# Patient Record
Sex: FEMALE | Race: OTHER | Employment: FULL TIME | URBAN - METROPOLITAN AREA
[De-identification: names, ages, dates, MRNs, and addresses within clinical notes are randomized per-mention and may not be internally consistent; named-entity substitution may affect disease eponyms.]

---

## 2019-02-13 ENCOUNTER — HOSPITAL ENCOUNTER (OUTPATIENT)
Facility: HOSPITAL | Age: 29
Setting detail: OBSERVATION
Discharge: HOME | End: 2019-02-14
Attending: EMERGENCY MEDICINE | Admitting: SURGERY
Payer: MEDICAID

## 2019-02-13 ENCOUNTER — APPOINTMENT (OUTPATIENT)
Dept: CT IMAGING | Age: 29
End: 2019-02-13
Attending: EMERGENCY MEDICINE
Payer: MEDICAID

## 2019-02-13 DIAGNOSIS — K35.80 ACUTE APPENDICITIS: ICD-10-CM

## 2019-02-13 DIAGNOSIS — R10.31 ABDOMINAL PAIN, RIGHT LOWER QUADRANT: Primary | ICD-10-CM

## 2019-02-13 LAB
ALBUMIN SERPL-MCNC: 3.5 G/DL (ref 3.4–5)
ALBUMIN/GLOB SERPL: 0.9 {RATIO} (ref 1–2)
ALP LIVER SERPL-CCNC: 62 U/L (ref 37–98)
ALT SERPL-CCNC: 14 U/L (ref 13–56)
ANION GAP SERPL CALC-SCNC: 9 MMOL/L (ref 0–18)
AST SERPL-CCNC: 11 U/L (ref 15–37)
BASOPHILS # BLD AUTO: 0.04 X10(3) UL (ref 0–0.2)
BASOPHILS NFR BLD AUTO: 0.3 %
BILIRUB SERPL-MCNC: 0.5 MG/DL (ref 0.1–2)
BILIRUB UR QL STRIP.AUTO: NEGATIVE
BUN BLD-MCNC: 6 MG/DL (ref 7–18)
BUN/CREAT SERPL: 6.7 (ref 10–20)
CALCIUM BLD-MCNC: 8.2 MG/DL (ref 8.5–10.1)
CHLORIDE SERPL-SCNC: 105 MMOL/L (ref 98–107)
CLARITY UR REFRACT.AUTO: CLEAR
CO2 SERPL-SCNC: 23 MMOL/L (ref 21–32)
COLOR UR AUTO: YELLOW
CREAT BLD-MCNC: 0.9 MG/DL (ref 0.55–1.02)
DEPRECATED RDW RBC AUTO: 40.8 FL (ref 35.1–46.3)
EOSINOPHIL # BLD AUTO: 0.02 X10(3) UL (ref 0–0.7)
EOSINOPHIL NFR BLD AUTO: 0.2 %
ERYTHROCYTE [DISTWIDTH] IN BLOOD BY AUTOMATED COUNT: 12.4 % (ref 11–15)
EXPIRATION DATE: NORMAL
GLOBULIN PLAS-MCNC: 4 G/DL (ref 2.8–4.4)
GLUCOSE BLD-MCNC: 116 MG/DL (ref 70–99)
GLUCOSE UR STRIP.AUTO-MCNC: NEGATIVE MG/DL
HCG SERPL QL: NEGATIVE
HCT VFR BLD AUTO: 35.7 % (ref 35–48)
HGB BLD-MCNC: 11.8 G/DL (ref 12–16)
IMM GRANULOCYTES # BLD AUTO: 0.02 X10(3) UL (ref 0–1)
IMM GRANULOCYTES NFR BLD: 0.2 %
KETONES UR STRIP.AUTO-MCNC: 15 MG/DL
LEUKOCYTE ESTERASE UR QL STRIP.AUTO: NEGATIVE
LYMPHOCYTES # BLD AUTO: 1.32 X10(3) UL (ref 1–4)
LYMPHOCYTES NFR BLD AUTO: 11.3 %
M PROTEIN MFR SERPL ELPH: 7.5 G/DL (ref 6.4–8.2)
MCH RBC QN AUTO: 29.6 PG (ref 26–34)
MCHC RBC AUTO-ENTMCNC: 33.1 G/DL (ref 31–37)
MCV RBC AUTO: 89.5 FL (ref 80–100)
MONOCYTES # BLD AUTO: 1.08 X10(3) UL (ref 0.1–1)
MONOCYTES NFR BLD AUTO: 9.2 %
NEUTROPHILS # BLD AUTO: 9.2 X10 (3) UL (ref 1.5–7.7)
NEUTROPHILS # BLD AUTO: 9.2 X10(3) UL (ref 1.5–7.7)
NEUTROPHILS NFR BLD AUTO: 78.8 %
NITRITE UR QL STRIP.AUTO: NEGATIVE
OSMOLALITY SERPL CALC.SUM OF ELEC: 283 MOSM/KG (ref 275–295)
PH UR STRIP.AUTO: 7 [PH] (ref 4.5–8)
PLATELET # BLD AUTO: 209 10(3)UL (ref 150–450)
POCT LOT NUMBER: NORMAL
POCT URINE PREGNANCY: NEGATIVE
POTASSIUM SERPL-SCNC: 3.4 MMOL/L (ref 3.5–5.1)
RBC # BLD AUTO: 3.99 X10(6)UL (ref 3.8–5.3)
SODIUM SERPL-SCNC: 137 MMOL/L (ref 136–145)
SP GR UR STRIP.AUTO: 1.02 (ref 1–1.03)
UROBILINOGEN UR STRIP.AUTO-MCNC: 1 MG/DL
WBC # BLD AUTO: 11.7 X10(3) UL (ref 4–11)

## 2019-02-13 PROCEDURE — 74177 CT ABD & PELVIS W/CONTRAST: CPT | Performed by: EMERGENCY MEDICINE

## 2019-02-13 RX ORDER — HYDROMORPHONE HYDROCHLORIDE 1 MG/ML
0.5 INJECTION, SOLUTION INTRAMUSCULAR; INTRAVENOUS; SUBCUTANEOUS EVERY 2 HOUR PRN
Status: DISCONTINUED | OUTPATIENT
Start: 2019-02-13 | End: 2019-02-14

## 2019-02-13 RX ORDER — HYDROMORPHONE HYDROCHLORIDE 1 MG/ML
1 INJECTION, SOLUTION INTRAMUSCULAR; INTRAVENOUS; SUBCUTANEOUS EVERY 2 HOUR PRN
Status: DISCONTINUED | OUTPATIENT
Start: 2019-02-13 | End: 2019-02-14

## 2019-02-13 RX ORDER — DEXTROSE AND SODIUM CHLORIDE 5; .45 G/100ML; G/100ML
INJECTION, SOLUTION INTRAVENOUS CONTINUOUS
Status: DISCONTINUED | OUTPATIENT
Start: 2019-02-13 | End: 2019-02-13

## 2019-02-13 RX ORDER — SODIUM CHLORIDE, SODIUM LACTATE, POTASSIUM CHLORIDE, CALCIUM CHLORIDE 600; 310; 30; 20 MG/100ML; MG/100ML; MG/100ML; MG/100ML
INJECTION, SOLUTION INTRAVENOUS CONTINUOUS
Status: DISCONTINUED | OUTPATIENT
Start: 2019-02-14 | End: 2019-02-14

## 2019-02-13 RX ORDER — ACETAMINOPHEN 500 MG
1000 TABLET ORAL ONCE
Status: COMPLETED | OUTPATIENT
Start: 2019-02-13 | End: 2019-02-13

## 2019-02-13 RX ORDER — ONDANSETRON 2 MG/ML
4 INJECTION INTRAMUSCULAR; INTRAVENOUS
Status: DISCONTINUED | OUTPATIENT
Start: 2019-02-13 | End: 2019-02-13

## 2019-02-13 RX ORDER — ONDANSETRON 2 MG/ML
4 INJECTION INTRAMUSCULAR; INTRAVENOUS EVERY 6 HOURS PRN
Status: DISCONTINUED | OUTPATIENT
Start: 2019-02-13 | End: 2019-02-14

## 2019-02-13 RX ORDER — ONDANSETRON 2 MG/ML
4 INJECTION INTRAMUSCULAR; INTRAVENOUS EVERY 4 HOURS PRN
Status: DISCONTINUED | OUTPATIENT
Start: 2019-02-13 | End: 2019-02-14

## 2019-02-14 ENCOUNTER — ANESTHESIA (OUTPATIENT)
Dept: SURGERY | Facility: HOSPITAL | Age: 29
End: 2019-02-14

## 2019-02-14 ENCOUNTER — ANESTHESIA EVENT (OUTPATIENT)
Dept: SURGERY | Facility: HOSPITAL | Age: 29
End: 2019-02-14

## 2019-02-14 VITALS
SYSTOLIC BLOOD PRESSURE: 106 MMHG | OXYGEN SATURATION: 97 % | HEIGHT: 62 IN | RESPIRATION RATE: 15 BRPM | TEMPERATURE: 98 F | BODY MASS INDEX: 37.02 KG/M2 | HEART RATE: 58 BPM | DIASTOLIC BLOOD PRESSURE: 49 MMHG | WEIGHT: 201.19 LBS

## 2019-02-14 LAB
ANION GAP SERPL CALC-SCNC: 7 MMOL/L (ref 0–18)
BUN BLD-MCNC: 4 MG/DL (ref 7–18)
BUN/CREAT SERPL: 4.3 (ref 10–20)
CALCIUM BLD-MCNC: 8.2 MG/DL (ref 8.5–10.1)
CHLORIDE SERPL-SCNC: 111 MMOL/L (ref 98–107)
CO2 SERPL-SCNC: 23 MMOL/L (ref 21–32)
CREAT BLD-MCNC: 0.92 MG/DL (ref 0.55–1.02)
GLUCOSE BLD-MCNC: 130 MG/DL (ref 70–99)
HCG URINE QUALITATIVE: NEGATIVE
OSMOLALITY SERPL CALC.SUM OF ELEC: 291 MOSM/KG (ref 275–295)
POTASSIUM SERPL-SCNC: 3.8 MMOL/L (ref 3.5–5.1)
SODIUM SERPL-SCNC: 141 MMOL/L (ref 136–145)

## 2019-02-14 PROCEDURE — 99203 OFFICE O/P NEW LOW 30 MIN: CPT | Performed by: SURGERY

## 2019-02-14 PROCEDURE — 44970 LAPAROSCOPY APPENDECTOMY: CPT | Performed by: SURGERY

## 2019-02-14 PROCEDURE — 0DTJ4ZZ RESECTION OF APPENDIX, PERCUTANEOUS ENDOSCOPIC APPROACH: ICD-10-PCS | Performed by: SURGERY

## 2019-02-14 RX ORDER — DIPHENHYDRAMINE HYDROCHLORIDE 50 MG/ML
12.5 INJECTION INTRAMUSCULAR; INTRAVENOUS AS NEEDED
Status: DISCONTINUED | OUTPATIENT
Start: 2019-02-14 | End: 2019-02-14 | Stop reason: HOSPADM

## 2019-02-14 RX ORDER — ONDANSETRON 2 MG/ML
4 INJECTION INTRAMUSCULAR; INTRAVENOUS AS NEEDED
Status: DISCONTINUED | OUTPATIENT
Start: 2019-02-14 | End: 2019-02-14 | Stop reason: HOSPADM

## 2019-02-14 RX ORDER — BUPIVACAINE HYDROCHLORIDE 5 MG/ML
INJECTION, SOLUTION EPIDURAL; INTRACAUDAL AS NEEDED
Status: DISCONTINUED | OUTPATIENT
Start: 2019-02-14 | End: 2019-02-14

## 2019-02-14 RX ORDER — HYDROCODONE BITARTRATE AND ACETAMINOPHEN 5; 325 MG/1; MG/1
1 TABLET ORAL AS NEEDED
Status: DISCONTINUED | OUTPATIENT
Start: 2019-02-14 | End: 2019-02-14 | Stop reason: HOSPADM

## 2019-02-14 RX ORDER — SODIUM CHLORIDE, SODIUM LACTATE, POTASSIUM CHLORIDE, CALCIUM CHLORIDE 600; 310; 30; 20 MG/100ML; MG/100ML; MG/100ML; MG/100ML
INJECTION, SOLUTION INTRAVENOUS CONTINUOUS
Status: DISCONTINUED | OUTPATIENT
Start: 2019-02-14 | End: 2019-02-14 | Stop reason: HOSPADM

## 2019-02-14 RX ORDER — MEPERIDINE HYDROCHLORIDE 25 MG/ML
INJECTION INTRAMUSCULAR; INTRAVENOUS; SUBCUTANEOUS
Status: COMPLETED
Start: 2019-02-14 | End: 2019-02-14

## 2019-02-14 RX ORDER — MIDAZOLAM HYDROCHLORIDE 1 MG/ML
1 INJECTION INTRAMUSCULAR; INTRAVENOUS EVERY 5 MIN PRN
Status: DISCONTINUED | OUTPATIENT
Start: 2019-02-14 | End: 2019-02-14 | Stop reason: HOSPADM

## 2019-02-14 RX ORDER — MEPERIDINE HYDROCHLORIDE 25 MG/ML
12.5 INJECTION INTRAMUSCULAR; INTRAVENOUS; SUBCUTANEOUS AS NEEDED
Status: DISCONTINUED | OUTPATIENT
Start: 2019-02-14 | End: 2019-02-14 | Stop reason: HOSPADM

## 2019-02-14 RX ORDER — HYDROCODONE BITARTRATE AND ACETAMINOPHEN 5; 325 MG/1; MG/1
1-2 TABLET ORAL EVERY 4 HOURS PRN
Qty: 20 TABLET | Refills: 0 | Status: SHIPPED | OUTPATIENT
Start: 2019-02-14 | End: 2019-07-25 | Stop reason: ALTCHOICE

## 2019-02-14 RX ORDER — HYDROCODONE BITARTRATE AND ACETAMINOPHEN 5; 325 MG/1; MG/1
2 TABLET ORAL AS NEEDED
Status: DISCONTINUED | OUTPATIENT
Start: 2019-02-14 | End: 2019-02-14 | Stop reason: HOSPADM

## 2019-02-14 RX ORDER — NALOXONE HYDROCHLORIDE 0.4 MG/ML
80 INJECTION, SOLUTION INTRAMUSCULAR; INTRAVENOUS; SUBCUTANEOUS AS NEEDED
Status: DISCONTINUED | OUTPATIENT
Start: 2019-02-14 | End: 2019-02-14 | Stop reason: HOSPADM

## 2019-02-14 RX ORDER — HYDROMORPHONE HYDROCHLORIDE 1 MG/ML
INJECTION, SOLUTION INTRAMUSCULAR; INTRAVENOUS; SUBCUTANEOUS
Status: COMPLETED
Start: 2019-02-14 | End: 2019-02-14

## 2019-02-14 RX ORDER — HYDROMORPHONE HYDROCHLORIDE 1 MG/ML
0.4 INJECTION, SOLUTION INTRAMUSCULAR; INTRAVENOUS; SUBCUTANEOUS EVERY 5 MIN PRN
Status: DISCONTINUED | OUTPATIENT
Start: 2019-02-14 | End: 2019-02-14 | Stop reason: HOSPADM

## 2019-02-14 RX ORDER — METOCLOPRAMIDE HYDROCHLORIDE 5 MG/ML
10 INJECTION INTRAMUSCULAR; INTRAVENOUS AS NEEDED
Status: DISCONTINUED | OUTPATIENT
Start: 2019-02-14 | End: 2019-02-14 | Stop reason: HOSPADM

## 2019-02-14 NOTE — OPERATIVE REPORT
[facility]    PATIENT'S NAME: Eduard Stahl   ATTENDING PHYSICIAN: Sancho Pedroza D.O.   OPERATING PHYSICIAN: Sancho Pedroza D.O.   PATIENT ACCOUNT#:   [de-identified]    LOCATION:  [address] [place] [address]  MEDICAL RECORD #:   [id number]       DATE OF BIRTH:  [birth year] anteriorly. The mesoappendix was taken down in sequential fashion using Sonicision cautery device.   The echelon Endo HARRISON was then fired across the cecum at the level of the appendix, and the appendix was amputated and brought out through the 10 port intac

## 2019-02-14 NOTE — ANESTHESIA POSTPROCEDURE EVALUATION
[address] Patient Status:  Observation   Age/Gender 34year old female MRN [id number]   [facility] 0SW-A Attending Christiano Zamorano, DO   Hosp Day # 0 PCP No primary care provider on file.        Anesthesia Post-op Note

## 2019-02-14 NOTE — ANESTHESIA PREPROCEDURE EVALUATION
PRE-OP EVALUATION    Patient Name: Kanu Russell    Pre-op Diagnosis: Acute appendicitis [K35.80]    Procedure(s):  LAPAROSCOPIC APPENDECTOMY, POSSIBLE OPEN    Surgeon(s) and Role:     Dave Turner,  - Romeo    Pre-op vitals reviewed.   Temp: 98.4 Component Value Date    WBC 11.7 (H) 02/13/2019    RBC 3.99 02/13/2019    HGB 11.8 (L) 02/13/2019    HCT 35.7 02/13/2019    MCV 89.5 02/13/2019    MCH 29.6 02/13/2019    MCHC 33.1 02/13/2019    RDW 12.4 02/13/2019    .0 02/13/2019     Lab Results

## 2019-02-14 NOTE — CM/SW NOTE
Pt. Received OON paperwork and wants to stay at [facility] per Alley Branett RN [facility] [address] [facility]

## 2019-02-14 NOTE — H&P
Taylor Rodriguez is a 34year old female  Patient presents with:  Abdomen/Flank Pain (GI/)      REFERRED BY    Patient presents with patient presents with right lower quadrant pain. Patient states the pain started yesterday.   It was initially in the periu :Alert, oriented x 3  PSYCH: normal mood and affect  SKIN: anicteric, no rashes, no bruising  EYES: PERRLA, EOMI, sclera anicteric,  conjunctiva without pallor  HEENT: normocephalic, atraumatic, TMs clear, nares patent, mouth moist, pharynx w/o erythema  N

## 2019-02-14 NOTE — PLAN OF CARE
Assumed care of patient around 0730, alert and oriented X4, no c/o CP/SOB, SpO2 98% on RA    0800-pt back from OR- 3 lap sites to abdomen, streri-strip, c/d/i, pain well controlled  Voided  Has friends at bedside    Patient tele d/c'd IV discontinued with

## 2019-02-18 ENCOUNTER — TELEPHONE (OUTPATIENT)
Dept: SURGERY | Facility: CLINIC | Age: 29
End: 2019-02-18

## 2019-02-18 RX ORDER — AMOXICILLIN AND CLAVULANATE POTASSIUM 875; 125 MG/1; MG/1
1 TABLET, FILM COATED ORAL 2 TIMES DAILY
Qty: 20 TABLET | Refills: 0 | Status: SHIPPED | OUTPATIENT
Start: 2019-02-18 | End: 2019-02-28

## 2019-02-18 NOTE — TELEPHONE ENCOUNTER
[facility]  Order for augmentin 875 BID for 10 days given  Med ordered. Patient notified and verb understanding.     Requested Prescriptions     Signed Prescriptions Disp Refills   • Amoxicillin-Pot Clavulanate (AUGMENTIN) 875-125 MG Or

## 2019-02-18 NOTE — TELEPHONE ENCOUNTER
PT calling states had lap appey on 2/14 and c/o bruising, pus, chills, and mild odor at site. States the bruise appeared yesterday and has gotten bigger. States feels numb-like sensation at as well. Her current PN level is 4/10. Denies fever.   Denies b

## 2019-02-19 ENCOUNTER — OFFICE VISIT (OUTPATIENT)
Dept: SURGERY | Facility: CLINIC | Age: 29
End: 2019-02-19

## 2019-02-19 VITALS
WEIGHT: 200 LBS | TEMPERATURE: 99 F | HEART RATE: 90 BPM | BODY MASS INDEX: 36.8 KG/M2 | SYSTOLIC BLOOD PRESSURE: 128 MMHG | DIASTOLIC BLOOD PRESSURE: 78 MMHG | HEIGHT: 62 IN

## 2019-02-19 DIAGNOSIS — K35.30 ACUTE APPENDICITIS WITH LOCALIZED PERITONITIS, WITHOUT PERFORATION, ABSCESS, OR GANGRENE: Primary | ICD-10-CM

## 2019-02-19 DIAGNOSIS — R10.31 ABDOMINAL PAIN, RIGHT LOWER QUADRANT: ICD-10-CM

## 2019-02-19 PROCEDURE — 99024 POSTOP FOLLOW-UP VISIT: CPT | Performed by: PHYSICIAN ASSISTANT

## 2019-02-19 NOTE — PROGRESS NOTES
Post Operative Visit Note       Active Problems  1. Acute appendicitis with localized peritonitis, without perforation, abscess, or gangrene    2.  Abdominal pain, right lower quadrant         Chief Complaint   Patient presents with:  Post-Op: P/O, LAP APPE Activity      Alcohol use: No        Frequency: Never      Drug use: No    Other Topics      Concerns:       Current Outpatient Medications:   •  Amoxicillin-Pot Clavulanate (AUGMENTIN) 875-125 MG Oral Tab, Take 1 tablet by mouth 2 (two) times daily for 10 murmur heard. Pulmonary/Chest: Effort normal and breath sounds normal. No respiratory distress. She has no wheezes. She has no rales. Abdominal: Soft. Bowel sounds are normal. She exhibits no distension. There is no tenderness.  There is no rebound and n time.  There is no evidence of wound infection. All questions were answered. The patient will follow-up on an as-needed basis for new or worsening concerns. Follow Up  Return if symptoms worsen or fail to improve.     DELIA Vincent

## 2019-07-25 ENCOUNTER — OFFICE VISIT (OUTPATIENT)
Dept: FAMILY MEDICINE CLINIC | Facility: CLINIC | Age: 29
End: 2019-07-25
Payer: MEDICAID

## 2019-07-25 VITALS
TEMPERATURE: 98 F | BODY MASS INDEX: 36.62 KG/M2 | OXYGEN SATURATION: 98 % | SYSTOLIC BLOOD PRESSURE: 108 MMHG | WEIGHT: 199 LBS | RESPIRATION RATE: 18 BRPM | HEART RATE: 69 BPM | HEIGHT: 62 IN | DIASTOLIC BLOOD PRESSURE: 70 MMHG

## 2019-07-25 DIAGNOSIS — R29.898 LEFT ARM WEAKNESS: ICD-10-CM

## 2019-07-25 DIAGNOSIS — Z78.9 USES DEPOT MEDROXYPROGESTERONE ACETATE AS PRIMARY BIRTH CONTROL METHOD: ICD-10-CM

## 2019-07-25 DIAGNOSIS — Z12.4 SCREENING FOR CERVICAL CANCER: ICD-10-CM

## 2019-07-25 DIAGNOSIS — Z00.00 ENCOUNTER FOR ANNUAL PHYSICAL EXAM: Primary | ICD-10-CM

## 2019-07-25 DIAGNOSIS — Z13.21 SCREENING FOR ENDOCRINE, NUTRITIONAL, METABOLIC AND IMMUNITY DISORDER: ICD-10-CM

## 2019-07-25 DIAGNOSIS — Z23 NEED FOR TDAP VACCINATION: ICD-10-CM

## 2019-07-25 DIAGNOSIS — E01.0 THYROMEGALY: ICD-10-CM

## 2019-07-25 DIAGNOSIS — Z13.228 SCREENING FOR ENDOCRINE, NUTRITIONAL, METABOLIC AND IMMUNITY DISORDER: ICD-10-CM

## 2019-07-25 DIAGNOSIS — E66.9 OBESITY (BMI 30-39.9): ICD-10-CM

## 2019-07-25 DIAGNOSIS — Z13.29 SCREENING FOR ENDOCRINE, NUTRITIONAL, METABOLIC AND IMMUNITY DISORDER: ICD-10-CM

## 2019-07-25 DIAGNOSIS — Z13.0 SCREENING FOR ENDOCRINE, NUTRITIONAL, METABOLIC AND IMMUNITY DISORDER: ICD-10-CM

## 2019-07-25 LAB — CONTROL LINE PRESENT WITH A CLEAR BACKGROUND (YES/NO): YES YES/NO

## 2019-07-25 PROCEDURE — 88175 CYTOPATH C/V AUTO FLUID REDO: CPT | Performed by: EMERGENCY MEDICINE

## 2019-07-25 PROCEDURE — 99385 PREV VISIT NEW AGE 18-39: CPT | Performed by: EMERGENCY MEDICINE

## 2019-07-25 PROCEDURE — 96372 THER/PROPH/DIAG INJ SC/IM: CPT | Performed by: EMERGENCY MEDICINE

## 2019-07-25 PROCEDURE — 81025 URINE PREGNANCY TEST: CPT | Performed by: EMERGENCY MEDICINE

## 2019-07-25 PROCEDURE — 87624 HPV HI-RISK TYP POOLED RSLT: CPT | Performed by: EMERGENCY MEDICINE

## 2019-07-25 PROCEDURE — 90471 IMMUNIZATION ADMIN: CPT | Performed by: EMERGENCY MEDICINE

## 2019-07-25 PROCEDURE — 90715 TDAP VACCINE 7 YRS/> IM: CPT | Performed by: EMERGENCY MEDICINE

## 2019-07-25 RX ORDER — MEDROXYPROGESTERONE ACETATE 150 MG/ML
150 INJECTION, SUSPENSION INTRAMUSCULAR ONCE
Status: COMPLETED | OUTPATIENT
Start: 2019-07-25 | End: 2019-07-25

## 2019-07-25 RX ADMIN — MEDROXYPROGESTERONE ACETATE 150 MG: 150 INJECTION, SUSPENSION INTRAMUSCULAR at 11:48:00

## 2019-07-25 NOTE — PATIENT INSTRUCTIONS
Thank you for choosing [facility]  To Do:  FOR Niles Bolton    · Follow up in 3 months for Depo shot, nurse visit only  · Have blood tests done after fasting  · Follow up yearly or as needed  · Follow up with neurology, Dr. Sony Bass for history exam every 3 years1   Cervical cancer Women ages 24 and older Women between ages 24 and 34 should have a Pap test every 3 years; women between ages 27 and 72 are advised to have a Pap test plus an HPV test every 5 years   Chlamydia Sexually active women ag second dose should be given 1 to 2 months after the first dose and the third dose given 6 months after the first dose   Influenza (flu) All women in this age group Once a year   Measles, mumps, rubella (MMR) All women in this age group who have no record o recommend CBE. 2Those who are 25years of age, who are not up-to-date on their childhood immunizations, should receive all appropriate catch-up vaccines recommended by the CDC.   Date Last Reviewed: 8/27/2015  © [id number] [facility] [place]

## 2019-07-25 NOTE — PROGRESS NOTES
Shay Hurtado is a 34year old female who presents for a complete physical exam.   HPI:     Patient presents with:  New Patient: physical and pap/ depo         Age: 34    1First day of last menstrual period (or first year of         menstruation, if thr last 12 months? 1    j. When is the last time you had           a dental check-up? April 2019       11. Please describe any concerns you have:          LEFT ARM WEAKNESS  3 days ago experienced a sudden sepisode of left arm weakness. + facial numbness.  Chantel Barahona deficits  HEENT: denies nasal congestion, sinus pain or sore throat; hearing loss negative,   RESPIRATORY: denies shortness of breath, wheezing or cough   CARDIOVASCULAR: denies chest pain, SOB, edema,orthopnea, no palpitations   GI: denies nausea, vomitin capillary refill less than 2 seconds grade 5 of 5 muscle strength in left shoulder abduction and adduction. Extremities: are symmetric with no cyanosis, clubbing, or edema. MS: Normal muscles tones, no joints abnormalities.   SKIN: Normal color, turgor, n 15 or higher and reapply every 2 hours as needed. Wear seat belts and drive safely. Schedule regular appointments with dentist.  Schedule yearly eye exam if you wear glasses/contacts. Yearly Flu Vaccine recommended.   Tetanus, Diptheria and Pertussis vac

## 2019-07-29 LAB — HPV I/H RISK 1 DNA SPEC QL NAA+PROBE: NEGATIVE

## 2019-07-30 ENCOUNTER — HOSPITAL ENCOUNTER (EMERGENCY)
Age: 29
Discharge: HOME | End: 2019-07-31
Attending: EMERGENCY MEDICINE
Payer: MEDICAID

## 2019-07-30 ENCOUNTER — APPOINTMENT (OUTPATIENT)
Dept: MRI IMAGING | Age: 29
End: 2019-07-30
Attending: EMERGENCY MEDICINE
Payer: MEDICAID

## 2019-07-30 DIAGNOSIS — G35 MULTIPLE SCLEROSIS (HCC): Primary | ICD-10-CM

## 2019-07-30 LAB
ALBUMIN SERPL-MCNC: 3.9 G/DL (ref 3.4–5)
ALBUMIN/GLOB SERPL: 1.1 {RATIO} (ref 1–2)
ALP LIVER SERPL-CCNC: 61 U/L (ref 37–98)
ALT SERPL-CCNC: 20 U/L (ref 13–56)
ANION GAP SERPL CALC-SCNC: 7 MMOL/L (ref 0–18)
AST SERPL-CCNC: 18 U/L (ref 15–37)
BASOPHILS # BLD AUTO: 0.05 X10(3) UL (ref 0–0.2)
BASOPHILS NFR BLD AUTO: 0.6 %
BILIRUB SERPL-MCNC: 0.6 MG/DL (ref 0.1–2)
BUN BLD-MCNC: 9 MG/DL (ref 7–18)
BUN/CREAT SERPL: 8.8 (ref 10–20)
CALCIUM BLD-MCNC: 8.5 MG/DL (ref 8.5–10.1)
CHLORIDE SERPL-SCNC: 108 MMOL/L (ref 98–112)
CO2 SERPL-SCNC: 24 MMOL/L (ref 21–32)
CREAT BLD-MCNC: 1.02 MG/DL (ref 0.55–1.02)
CRP SERPL-MCNC: <0.29 MG/DL (ref ?–0.3)
DEPRECATED RDW RBC AUTO: 43.4 FL (ref 35.1–46.3)
EOSINOPHIL # BLD AUTO: 0.12 X10(3) UL (ref 0–0.7)
EOSINOPHIL NFR BLD AUTO: 1.4 %
ERYTHROCYTE [DISTWIDTH] IN BLOOD BY AUTOMATED COUNT: 12.7 % (ref 11–15)
GLOBULIN PLAS-MCNC: 3.6 G/DL (ref 2.8–4.4)
GLUCOSE BLD-MCNC: 92 MG/DL (ref 70–99)
HCT VFR BLD AUTO: 37.2 % (ref 35–48)
HGB BLD-MCNC: 12.2 G/DL (ref 12–16)
IMM GRANULOCYTES # BLD AUTO: 0.02 X10(3) UL (ref 0–1)
IMM GRANULOCYTES NFR BLD: 0.2 %
LYMPHOCYTES # BLD AUTO: 1.98 X10(3) UL (ref 1–4)
LYMPHOCYTES NFR BLD AUTO: 23.1 %
M PROTEIN MFR SERPL ELPH: 7.5 G/DL (ref 6.4–8.2)
MCH RBC QN AUTO: 30.3 PG (ref 26–34)
MCHC RBC AUTO-ENTMCNC: 32.8 G/DL (ref 31–37)
MCV RBC AUTO: 92.5 FL (ref 80–100)
MONOCYTES # BLD AUTO: 0.62 X10(3) UL (ref 0.1–1)
MONOCYTES NFR BLD AUTO: 7.2 %
NEUTROPHILS # BLD AUTO: 5.77 X10 (3) UL (ref 1.5–7.7)
NEUTROPHILS # BLD AUTO: 5.77 X10(3) UL (ref 1.5–7.7)
NEUTROPHILS NFR BLD AUTO: 67.5 %
OSMOLALITY SERPL CALC.SUM OF ELEC: 286 MOSM/KG (ref 275–295)
PLATELET # BLD AUTO: 241 10(3)UL (ref 150–450)
POTASSIUM SERPL-SCNC: 3.6 MMOL/L (ref 3.5–5.1)
RBC # BLD AUTO: 4.02 X10(6)UL (ref 3.8–5.3)
SED RATE-ML: 15 MM/HR (ref 0–25)
SODIUM SERPL-SCNC: 139 MMOL/L (ref 136–145)
WBC # BLD AUTO: 8.6 X10(3) UL (ref 4–11)

## 2019-07-30 PROCEDURE — 70549 MR ANGIOGRAPH NECK W/O&W/DYE: CPT | Performed by: EMERGENCY MEDICINE

## 2019-07-30 PROCEDURE — 96374 THER/PROPH/DIAG INJ IV PUSH: CPT

## 2019-07-30 PROCEDURE — 85652 RBC SED RATE AUTOMATED: CPT | Performed by: EMERGENCY MEDICINE

## 2019-07-30 PROCEDURE — 80053 COMPREHEN METABOLIC PANEL: CPT | Performed by: EMERGENCY MEDICINE

## 2019-07-30 PROCEDURE — 85025 COMPLETE CBC W/AUTO DIFF WBC: CPT | Performed by: EMERGENCY MEDICINE

## 2019-07-30 PROCEDURE — 70553 MRI BRAIN STEM W/O & W/DYE: CPT | Performed by: EMERGENCY MEDICINE

## 2019-07-30 PROCEDURE — A9575 INJ GADOTERATE MEGLUMI 0.1ML: HCPCS | Performed by: EMERGENCY MEDICINE

## 2019-07-30 PROCEDURE — 99285 EMERGENCY DEPT VISIT HI MDM: CPT

## 2019-07-30 PROCEDURE — 70546 MR ANGIOGRAPH HEAD W/O&W/DYE: CPT | Performed by: EMERGENCY MEDICINE

## 2019-07-30 PROCEDURE — 99284 EMERGENCY DEPT VISIT MOD MDM: CPT

## 2019-07-30 PROCEDURE — 86140 C-REACTIVE PROTEIN: CPT | Performed by: EMERGENCY MEDICINE

## 2019-07-31 ENCOUNTER — TELEPHONE (OUTPATIENT)
Dept: FAMILY MEDICINE CLINIC | Facility: CLINIC | Age: 29
End: 2019-07-31

## 2019-07-31 ENCOUNTER — TELEPHONE (OUTPATIENT)
Dept: NEUROLOGY | Facility: CLINIC | Age: 29
End: 2019-07-31

## 2019-07-31 VITALS
OXYGEN SATURATION: 99 % | BODY MASS INDEX: 36 KG/M2 | SYSTOLIC BLOOD PRESSURE: 106 MMHG | HEART RATE: 63 BPM | DIASTOLIC BLOOD PRESSURE: 57 MMHG | TEMPERATURE: 98 F | WEIGHT: 199.06 LBS | RESPIRATION RATE: 14 BRPM

## 2019-07-31 RX ORDER — METHYLPREDNISOLONE SODIUM SUCCINATE 125 MG/2ML
500 INJECTION, POWDER, LYOPHILIZED, FOR SOLUTION INTRAMUSCULAR; INTRAVENOUS ONCE
Status: COMPLETED | OUTPATIENT
Start: 2019-07-31 | End: 2019-07-31

## 2019-07-31 NOTE — TELEPHONE ENCOUNTER
----- Message from Wilder Jackson MD sent at 7/30/2019  6:04 PM CDT -----  NEG PAP and  Neg HPV  Repeat in 3 years

## 2019-07-31 NOTE — ED PROVIDER NOTES
Patient Seen in: [facility] In [place]    History   Patient presents with:  Numbness Weakness (neurologic)    Stated Complaint: weird sensation on left side of body, left sided weakness    HPI    Patient presents with third episode in or pallor  HEENT: Pupils equal round reactive to light. EOMI. Fundi without hemorrhage or papilledema. Oropharynx moist.  Throat normal.  Neck: No meningismus or adenopathy.   Carotid arteries 2+ and equal bilaterally without bruits  Lungs: Clear  Heart: DO Keya on 7/30/2019 at 23:56     Approved by: Mendoza Jacobo DO              [facility]   Patient with probable new diagnosis of multiple sclerosis. Discussed with Dr. Suni Massey.   Patient is given Solu-Medrol 500 mg intravenously discharged home to follow-up with

## 2019-07-31 NOTE — TELEPHONE ENCOUNTER
Patient Instructions by Holly Patterson CMA at 08/28/18 11:28 AM     Author:  Holly Patterson CMA Service:  (none) Author Type:  Certified Medical Assistant     Filed:  08/28/18 11:28 AM Encounter Date:  8/28/2018 Status:  Signed     :  Holly Patterson CMA ([facility] Medical Assistant)            TREATMENT ORDERS      Follow-Up  11 weeks    Time left: 8/28/2018 11:28 AM     Next appointment:        Location:        Provider:        Please note: 24 hour notice for cancellation of appointment is required.    You may receive a survey in the mail, or via the e-mail address that you have provided.  We would appreciate if you could fill out the survey and provide us with any feedback on your experience regarding your visit today. Thank you for allowing us to provide you with your health care needs.     Do not hesitate to call if you are experiencing severe pain, worsening or change in your pain, have symptoms of infection (fever, warmth, redness, increased drainage), or have any other problem that concerns you ~ [phone] (or [phone] after hours).    Please remember when requesting refills on pain medication that the request should be made by Thursday at the latest. [facility] is open Monday-Friday, 8am-5pm, and closed on the weekends.  No narcotic refills will be filled after hours.    Additional Educational Resources:  For additional resources regarding your symptoms, diagnosis, or further health information, please visit the Health Resources section on [email or web address] or the Online Health Resources section in [email or web address].        Revision History        User Key Date/Time User Provider Type Action    > [N/A] 08/28/18 11:28 AM Holly Patterson CMA Certified Medical Assistant Sign             Spoke with pt. Pt informed of test results below. Pt states understanding.

## 2019-07-31 NOTE — TELEPHONE ENCOUNTER
NP appt 8/1/9 3 pm on hold for patient. Left message on patient identified voicemail (ok with HIPPA consent) informing patient she will need to call to confirm by this afternoon.

## 2019-07-31 NOTE — ED INITIAL ASSESSMENT (HPI)
Pt in er for c/o having a 2-3 minute period of time where she had a heavy feeling in her left arm, could not lift her arm or close her hand, and had numbness in her left leg, symptoms has resolved at this time

## 2019-08-01 ENCOUNTER — OFFICE VISIT (OUTPATIENT)
Dept: NEUROLOGY | Facility: CLINIC | Age: 29
End: 2019-08-01
Payer: MEDICAID

## 2019-08-01 VITALS
HEART RATE: 72 BPM | RESPIRATION RATE: 16 BRPM | SYSTOLIC BLOOD PRESSURE: 122 MMHG | WEIGHT: 200 LBS | DIASTOLIC BLOOD PRESSURE: 70 MMHG | BODY MASS INDEX: 37 KG/M2

## 2019-08-01 DIAGNOSIS — G37.9 DEMYELINATING LESION (HCC): Primary | ICD-10-CM

## 2019-08-01 PROCEDURE — 99245 OFF/OP CONSLTJ NEW/EST HI 55: CPT | Performed by: OTHER

## 2019-08-01 NOTE — PROGRESS NOTES
[facility] [address]   Neurology- INITIAL CLINIC VISIT  2019, 3:16 PM     Jason Bryson Patient Status:  No patient class for patient encounter    1990 MRN [id number]   [facility] Family History:  family history includes Diabetes in her father and paternal grandfather; Hypertension in her father. Social History:   reports that she has never smoked.  She has never used smokeless tobacco. She reports that she does not drink al intact. Gait was narrow and stable, was able to walk on heels, toes and tandem without any difficulty.      ASSESSMENT/ACTIVE PROBLEM LIST:   Demyelinating lesion (hcc)  (primary encounter diagnosis)    Discussion/Plan:  Differentiation in space but not t

## 2019-08-01 NOTE — PATIENT INSTRUCTIONS
Refill policies:    • Allow 2-3 business days for refills; controlled substances may take longer.   • Contact your pharmacy at least 5 days prior to running out of medication and have them send an electronic request or submit request through the “request re Depending on your insurance carrier, approval may take 3-10 days. It is highly recommended patients contact their insurance carrier directly to determine coverage.   If test is done without insurance authorization, patient may be responsible for the entire known as a spinal tap, may be done to diagnose or treat a condition. A lumbar puncture uses a thin, hollow needle and a special form of real-time x-ray called fluoroscopy to remove a small amount of cerebrospinal fluid for lab analysis.   Do not schedule t your visit could take up to 6 hours, which includes recovery time after the procedure. Post-procedure:  • Push caffeinated beverages after lumbar puncture to help prevent headaches.   • It is common to have a mild headache for 3-4 days post lumbar punctu

## 2019-08-09 ENCOUNTER — HOSPITAL ENCOUNTER (OUTPATIENT)
Dept: MRI IMAGING | Age: 29
Discharge: HOME | End: 2019-08-09
Attending: Other
Payer: MEDICAID

## 2019-08-09 DIAGNOSIS — G37.9 DEMYELINATING LESION (HCC): ICD-10-CM

## 2019-08-09 PROCEDURE — A9575 INJ GADOTERATE MEGLUMI 0.1ML: HCPCS | Performed by: OTHER

## 2019-08-09 PROCEDURE — 72156 MRI NECK SPINE W/O & W/DYE: CPT | Performed by: OTHER

## 2019-08-09 PROCEDURE — 72157 MRI CHEST SPINE W/O & W/DYE: CPT | Performed by: OTHER

## 2019-08-12 ENCOUNTER — HOSPITAL ENCOUNTER (OUTPATIENT)
Dept: GENERAL RADIOLOGY | Facility: HOSPITAL | Age: 29
Discharge: HOME | End: 2019-08-12
Attending: Other
Payer: MEDICAID

## 2019-08-12 ENCOUNTER — NURSE ONLY (OUTPATIENT)
Dept: LAB | Facility: HOSPITAL | Age: 29
End: 2019-08-12
Attending: Other
Payer: MEDICAID

## 2019-08-12 VITALS
BODY MASS INDEX: 36.8 KG/M2 | HEIGHT: 62 IN | HEART RATE: 69 BPM | TEMPERATURE: 98 F | OXYGEN SATURATION: 99 % | DIASTOLIC BLOOD PRESSURE: 47 MMHG | RESPIRATION RATE: 16 BRPM | SYSTOLIC BLOOD PRESSURE: 103 MMHG | WEIGHT: 200 LBS

## 2019-08-12 DIAGNOSIS — Z13.29 SCREENING FOR ENDOCRINE, NUTRITIONAL, METABOLIC AND IMMUNITY DISORDER: ICD-10-CM

## 2019-08-12 DIAGNOSIS — Z13.228 SCREENING FOR ENDOCRINE, NUTRITIONAL, METABOLIC AND IMMUNITY DISORDER: ICD-10-CM

## 2019-08-12 DIAGNOSIS — Z13.21 SCREENING FOR ENDOCRINE, NUTRITIONAL, METABOLIC AND IMMUNITY DISORDER: ICD-10-CM

## 2019-08-12 DIAGNOSIS — G37.9 DEMYELINATING LESION (HCC): Primary | ICD-10-CM

## 2019-08-12 DIAGNOSIS — E01.0 THYROMEGALY: ICD-10-CM

## 2019-08-12 DIAGNOSIS — G37.9 DEMYELINATING LESION (HCC): ICD-10-CM

## 2019-08-12 DIAGNOSIS — E66.9 OBESITY (BMI 30-39.9): ICD-10-CM

## 2019-08-12 DIAGNOSIS — Z13.0 SCREENING FOR ENDOCRINE, NUTRITIONAL, METABOLIC AND IMMUNITY DISORDER: ICD-10-CM

## 2019-08-12 LAB
ALBUMIN SERPL-MCNC: 3.8 G/DL (ref 3.4–5)
ALBUMIN/GLOB SERPL: 1 {RATIO} (ref 1–2)
ALP LIVER SERPL-CCNC: 65 U/L (ref 37–98)
ALT SERPL-CCNC: 18 U/L (ref 13–56)
ANION GAP SERPL CALC-SCNC: 7 MMOL/L (ref 0–18)
AST SERPL-CCNC: 14 U/L (ref 15–37)
B BURGDOR IGG+IGM SER QL: NEGATIVE
BASOPHIL CSF: 0 %
BASOPHILS # BLD AUTO: 0.03 X10(3) UL (ref 0–0.2)
BASOPHILS NFR BLD AUTO: 0.5 %
BILIRUB SERPL-MCNC: 0.8 MG/DL (ref 0.1–2)
BUN BLD-MCNC: 5 MG/DL (ref 7–18)
BUN/CREAT SERPL: 5.7 (ref 10–20)
CALCIUM BLD-MCNC: 9.2 MG/DL (ref 8.5–10.1)
CHLORIDE SERPL-SCNC: 107 MMOL/L (ref 98–112)
CHOLEST SMN-MCNC: 113 MG/DL (ref ?–200)
CLARITY CSF: CLEAR
CO2 SERPL-SCNC: 24 MMOL/L (ref 21–32)
COLOR CSF: COLORLESS
COUNT PERFORMED ON TUBE: 4
CREAT BLD-MCNC: 0.88 MG/DL (ref 0.55–1.02)
DEPRECATED RDW RBC AUTO: 42.5 FL (ref 35.1–46.3)
EOSINOPHIL # BLD AUTO: 0.08 X10(3) UL (ref 0–0.7)
EOSINOPHIL NFR BLD AUTO: 1.2 %
EOSINOPHILS CSF: 1 %
ERYTHROCYTE [DISTWIDTH] IN BLOOD BY AUTOMATED COUNT: 12.4 % (ref 11–15)
EST. AVERAGE GLUCOSE BLD GHB EST-MCNC: 91 MG/DL (ref 68–126)
GLOBULIN PLAS-MCNC: 4 G/DL (ref 2.8–4.4)
GLUCOSE BLD-MCNC: 86 MG/DL (ref 70–99)
GLUCOSE CSF-MCNC: 49 MG/DL (ref 40–70)
HBA1C MFR BLD HPLC: 4.8 % (ref ?–5.7)
HCT VFR BLD AUTO: 39.1 % (ref 35–48)
HDLC SERPL-MCNC: 37 MG/DL (ref 40–59)
HGB BLD-MCNC: 12.8 G/DL (ref 12–16)
IMM GRANULOCYTES # BLD AUTO: 0.01 X10(3) UL (ref 0–1)
IMM GRANULOCYTES NFR BLD: 0.2 %
INR BLD: 1.1 (ref 0.9–1.1)
LDLC SERPL CALC-MCNC: 64 MG/DL (ref ?–100)
LYMPHOCYTES # BLD AUTO: 1.71 X10(3) UL (ref 1–4)
LYMPHOCYTES CSF: 95 %
LYMPHOCYTES NFR BLD AUTO: 26 %
M PROTEIN MFR SERPL ELPH: 7.8 G/DL (ref 6.4–8.2)
MCH RBC QN AUTO: 30.5 PG (ref 26–34)
MCHC RBC AUTO-ENTMCNC: 32.7 G/DL (ref 31–37)
MCV RBC AUTO: 93.1 FL (ref 80–100)
MONOCYTES # BLD AUTO: 0.41 X10(3) UL (ref 0.1–1)
MONOCYTES NFR BLD AUTO: 6.2 %
MONOMACROPHAGES CSF: 4 %
NEUTROPHILS # BLD AUTO: 4.33 X10 (3) UL (ref 1.5–7.7)
NEUTROPHILS # BLD AUTO: 4.33 X10(3) UL (ref 1.5–7.7)
NEUTROPHILS CSF: 0 %
NEUTROPHILS NFR BLD AUTO: 65.9 %
NONHDLC SERPL-MCNC: 76 MG/DL (ref ?–130)
OSMOLALITY SERPL CALC.SUM OF ELEC: 283 MOSM/KG (ref 275–295)
PLATELET # BLD AUTO: 251 10(3)UL (ref 150–450)
POTASSIUM SERPL-SCNC: 3.8 MMOL/L (ref 3.5–5.1)
PROT PATTERN CSF ELPH-IMP: 29.5 MG/DL (ref 15–45)
PSA SERPL DL<=0.01 NG/ML-MCNC: 14.7 SECONDS (ref 12.5–14.7)
RBC # BLD AUTO: 4.2 X10(6)UL (ref 3.8–5.3)
RBC # CSF: 6 /MM3
SODIUM SERPL-SCNC: 138 MMOL/L (ref 136–145)
THYROGLOB SERPL-MCNC: 19 U/ML (ref ?–60)
THYROPEROXIDASE AB SERPL-ACNC: <28 U/ML (ref ?–60)
TOTAL CELLS COUNTED: 100
TOTAL VOLUME CSF: 8 ML
TRIGL SERPL-MCNC: 58 MG/DL (ref 30–149)
TSI SER-ACNC: 0.76 MIU/ML (ref 0.36–3.74)
VIT D+METAB SERPL-MCNC: 22.7 NG/ML (ref 30–100)
VLDLC SERPL CALC-MCNC: 12 MG/DL (ref 0–30)
WBC # BLD AUTO: 6.6 X10(3) UL (ref 4–11)
WBC # CSF: 17 /MM3 (ref 0–5)

## 2019-08-12 PROCEDURE — 86376 MICROSOMAL ANTIBODY EACH: CPT

## 2019-08-12 PROCEDURE — 85025 COMPLETE CBC W/AUTO DIFF WBC: CPT

## 2019-08-12 PROCEDURE — 84443 ASSAY THYROID STIM HORMONE: CPT

## 2019-08-12 PROCEDURE — 80061 LIPID PANEL: CPT

## 2019-08-12 PROCEDURE — 86618 LYME DISEASE ANTIBODY: CPT

## 2019-08-12 PROCEDURE — 89050 BODY FLUID CELL COUNT: CPT

## 2019-08-12 PROCEDURE — 83916 OLIGOCLONAL BANDS: CPT

## 2019-08-12 PROCEDURE — 86038 ANTINUCLEAR ANTIBODIES: CPT

## 2019-08-12 PROCEDURE — 77003 FLUOROGUIDE FOR SPINE INJECT: CPT | Performed by: OTHER

## 2019-08-12 PROCEDURE — 82945 GLUCOSE OTHER FLUID: CPT

## 2019-08-12 PROCEDURE — 62270 DX LMBR SPI PNXR: CPT | Performed by: OTHER

## 2019-08-12 PROCEDURE — 36415 COLL VENOUS BLD VENIPUNCTURE: CPT

## 2019-08-12 PROCEDURE — 86800 THYROGLOBULIN ANTIBODY: CPT

## 2019-08-12 PROCEDURE — 82784 ASSAY IGA/IGD/IGG/IGM EACH: CPT

## 2019-08-12 PROCEDURE — 85610 PROTHROMBIN TIME: CPT

## 2019-08-12 PROCEDURE — 82306 VITAMIN D 25 HYDROXY: CPT

## 2019-08-12 PROCEDURE — 88108 CYTOPATH CONCENTRATE TECH: CPT

## 2019-08-12 PROCEDURE — 80053 COMPREHEN METABOLIC PANEL: CPT

## 2019-08-12 PROCEDURE — 83036 HEMOGLOBIN GLYCOSYLATED A1C: CPT

## 2019-08-12 PROCEDURE — 89051 BODY FLUID CELL COUNT: CPT

## 2019-08-12 PROCEDURE — 84157 ASSAY OF PROTEIN OTHER: CPT

## 2019-08-12 PROCEDURE — 82164 ANGIOTENSIN I ENZYME TEST: CPT

## 2019-08-12 NOTE — IMAGING NOTE
Report called to [facility] in [facility]. Procedure explained to patient and patient's fiance. X-ray department made aware patient is on bay 1 and labs are resulted.

## 2019-08-13 ENCOUNTER — PATIENT MESSAGE (OUTPATIENT)
Dept: FAMILY MEDICINE CLINIC | Facility: CLINIC | Age: 29
End: 2019-08-13

## 2019-08-13 LAB
ALBUMIN INDEX: 3.7 RATIO
ALBUMIN, CSF: 16 MG/DL
ALBUMIN, SERUM/PLASMA, NEPH: 4310 MG/DL
ANGIOTENSIN CONVERTING E, CSF: 1.1 U/L
CSF IGG SYNTHESIS RATE: <0 MG/D
CSF IGG/ALBUMIN RATIO: 0.17 RATIO
IGG INDEX: 0.56 RATIO
IMMUNOGLOBULIN G CSF: 2.7 MG/DL
IMMUNOGLOBULIN G: 1290 MG/DL
NON GYNE INTERPRETATION: NEGATIVE

## 2019-08-13 NOTE — TELEPHONE ENCOUNTER
From: Owen Lamb  To: Esperanza Juarez MD  Sent: 8/13/2019 12:17 PM CDT  Subject: Test Results Question    I have a question about PROTHROMBIN TIME (PT) resulted on 8/12/19 at 9:53 AM.    Could you explain to me what this test was for and what

## 2019-08-13 NOTE — TELEPHONE ENCOUNTER
From: Mariya Park  To: Bibi Payan MD  Sent: 8/13/2019 12:18 PM CDT  Subject: Test Results Question    I have a question about CBC W/ DIFFERENTIAL resulted on 8/12/19 at 9:26 AM.    Could you explain each test and what the results mean.

## 2019-08-14 LAB
ANA SCREEN: NEGATIVE
CSF BAND OLIGOCLONAL: POSITIVE
CSF OLIGOCLONAL BANDS NUMBER: 7 BANDS

## 2019-08-16 ENCOUNTER — PATIENT MESSAGE (OUTPATIENT)
Dept: FAMILY MEDICINE CLINIC | Facility: CLINIC | Age: 29
End: 2019-08-16

## 2019-08-16 NOTE — TELEPHONE ENCOUNTER
Patient w/ concern for risk of heart disease because of HDL <40. OK to advise low risk because of other normal cholesterol levels? I can advise on foods to help raise HDL as well. Thanks.

## 2019-08-16 NOTE — PROGRESS NOTES
Yes, ok to advise on increasing physical activity and exercise and increasing foods high in omega-3 acids to help raise HDL, etc

## 2019-08-16 NOTE — TELEPHONE ENCOUNTER
From: Quinten Steele  To: Enrique Anderson MD  Sent: 8/16/2019 10:32 AM CDT  Subject: Test Results Question    I have a question about LIPID PANEL resulted on 8/12/19 at 10:09 AM.    For the cholesterol one I see it say I could be at risk of heart

## 2019-08-20 ENCOUNTER — TELEPHONE (OUTPATIENT)
Dept: NEUROLOGY | Facility: CLINIC | Age: 29
End: 2019-08-20

## 2019-08-20 DIAGNOSIS — G35 MULTIPLE SCLEROSIS (HCC): Primary | ICD-10-CM

## 2019-08-20 NOTE — TELEPHONE ENCOUNTER
Patient informed she needs to come in to fill out some paperwork regarding Tecfidera start program and nurse would also speak with her about the medication and lab tests required.  Patient states she will come in tomorrow am after 8am.

## 2019-08-20 NOTE — TELEPHONE ENCOUNTER
Tecfidera PA initiated on [facility]. Key: [id number].  Will check with Dr Tab Oviedo to see if she wants JCV lab test.

## 2019-08-21 ENCOUNTER — NURSE ONLY (OUTPATIENT)
Dept: NEUROLOGY | Facility: CLINIC | Age: 29
End: 2019-08-21
Payer: MEDICAID

## 2019-08-21 NOTE — TELEPHONE ENCOUNTER
Patient completed her portion of Tecfidera Start form and given [facility] lab order for JCV lab test. Patient given information on common side effects of Tecfidera such as GI symptoms and Flushing symptoms.  Advised to take Tecfidera with food and may take  Asp

## 2019-08-21 NOTE — TELEPHONE ENCOUNTER
Received fax from [facility] stating pt is unknown to them in regards to [place] PA started on CMM. PA form filled out was incorrect.     Filled out and submitted new PA on CMM     Key: [id number]

## 2019-08-22 ENCOUNTER — PATIENT MESSAGE (OUTPATIENT)
Dept: NEUROLOGY | Facility: CLINIC | Age: 29
End: 2019-08-22

## 2019-08-22 NOTE — TELEPHONE ENCOUNTER
Fax received from [facility] stating coverage for Becky Garvin is denied-  The covered drugs are Aubagio, Glatiramer, Avonex and Extavia.   Patient must try and fail 4 formulary drugs or the prescriber must state patient cannot take the formu

## 2019-08-22 NOTE — TELEPHONE ENCOUNTER
Please let patient know. My recommendation would be to try Copaxone.  We can discuss more at visit first.

## 2019-08-23 NOTE — TELEPHONE ENCOUNTER
From: Lucy Ragsdale  To:  Marina Barragan DO  Sent: 8/22/2019 7:45 PM CDT  Subject: Other    Hello Dr. Radha Bliss,    I wanted to know how long does it usually take to get approved for treatment after taking the lab test that was required of me o

## 2019-08-23 NOTE — PROGRESS NOTES
Encounter closed as per KUN Larkin, patient has spoken to [facility] RN see TE 8/20/2019.

## 2019-08-25 LAB — INDEX VALUE: 0.39

## 2019-08-27 ENCOUNTER — OFFICE VISIT (OUTPATIENT)
Dept: FAMILY MEDICINE CLINIC | Facility: CLINIC | Age: 29
End: 2019-08-27
Payer: MEDICAID

## 2019-08-27 VITALS
WEIGHT: 195 LBS | SYSTOLIC BLOOD PRESSURE: 106 MMHG | DIASTOLIC BLOOD PRESSURE: 70 MMHG | BODY MASS INDEX: 36 KG/M2 | OXYGEN SATURATION: 98 % | HEART RATE: 91 BPM | RESPIRATION RATE: 15 BRPM

## 2019-08-27 DIAGNOSIS — M54.50 CHRONIC LOW BACK PAIN WITHOUT SCIATICA, UNSPECIFIED BACK PAIN LATERALITY: ICD-10-CM

## 2019-08-27 DIAGNOSIS — R10.32 LLQ ABDOMINAL PAIN: Primary | ICD-10-CM

## 2019-08-27 DIAGNOSIS — G89.29 CHRONIC LOW BACK PAIN WITHOUT SCIATICA, UNSPECIFIED BACK PAIN LATERALITY: ICD-10-CM

## 2019-08-27 LAB
APPEARANCE: CLEAR
BILIRUBIN: NEGATIVE
CONTROL LINE PRESENT WITH A CLEAR BACKGROUND (YES/NO): YES YES/NO
GLUCOSE (URINE DIPSTICK): NEGATIVE MG/DL
KETONES (URINE DIPSTICK): NEGATIVE MG/DL
LEUKOCYTES: NEGATIVE
MULTISTIX LOT#: ABNORMAL NUMERIC
NITRITE, URINE: NEGATIVE
PH, URINE: 7.5 (ref 4.5–8)
PREGNANCY TEST, URINE: NEGATIVE
SPECIFIC GRAVITY: 1.01 (ref 1–1.03)
UROBILINOGEN,SEMI-QN: 0.2 MG/DL (ref 0–1.9)

## 2019-08-27 PROCEDURE — 87491 CHLMYD TRACH DNA AMP PROBE: CPT | Performed by: EMERGENCY MEDICINE

## 2019-08-27 PROCEDURE — 81025 URINE PREGNANCY TEST: CPT | Performed by: EMERGENCY MEDICINE

## 2019-08-27 PROCEDURE — 81003 URINALYSIS AUTO W/O SCOPE: CPT | Performed by: EMERGENCY MEDICINE

## 2019-08-27 PROCEDURE — 99214 OFFICE O/P EST MOD 30 MIN: CPT | Performed by: EMERGENCY MEDICINE

## 2019-08-27 PROCEDURE — 87591 N.GONORRHOEAE DNA AMP PROB: CPT | Performed by: EMERGENCY MEDICINE

## 2019-08-27 NOTE — PROGRESS NOTES
Chief Complaint:   Patient presents with:  Abdominal Pain: C/o LT abdominal sharp pain     HPI:   This is a 34year old female     ABDOMINAL PAIN  Pain to left side of abdomen. Startred 6 months ago after appendicitis. Pain better with food intake.  + sta except those stated as above    PHYSICAL EXAM:   /70   Pulse 91   Resp 15   Wt 195 lb   SpO2 98%   BMI 35.67 kg/m²  Estimated body mass index is 35.67 kg/m² as calculated from the following:    Height as of 8/2/19: 62\".     Weight as of this encounte Expiration Date 4/30/2020 Date   CHLAMYDIA/GONOCOCCUS, GAYATHRI    Collection Time: 08/27/19  2:28 PM   Result Value Ref Range    Chlamydia Trachomatis Amplified RNA Negative Negative    Neisseria Gonorrhoeae Amplified RNA Negative Negative    Chlam/GC Source U

## 2019-08-27 NOTE — PATIENT INSTRUCTIONS
Thank you for choosing [facility]  To Do:  FOR Corey Goff    · Arrange for ultrasound of pelvis  · Keep a symptom diary  · To ER if worse          Unknown Causes of Abdominal Pain (Female)    The exact cause of your belly (abdominal) p you don't get dehydrated. Soup may also be good. Sports drinks may also help, especially if they are not too acidic. Don't drink sugary drinks as this can make things worse. Take liquids in small amounts. Don’t guzzle them.   · Caffeine sometimes makes the healthcare professional's instructions.

## 2019-08-28 LAB
C TRACH DNA SPEC QL NAA+PROBE: NEGATIVE
N GONORRHOEA DNA SPEC QL NAA+PROBE: NEGATIVE

## 2019-08-28 NOTE — TELEPHONE ENCOUNTER
Patient has appointment with Dr. Araceli Andujar on 9/6/2019. Start form paperwork in [employer] [place] [employer] waiting file. Will need education and signature.

## 2019-09-01 ENCOUNTER — HOSPITAL ENCOUNTER (OUTPATIENT)
Dept: ULTRASOUND IMAGING | Age: 29
Discharge: HOME | End: 2019-09-01
Attending: EMERGENCY MEDICINE
Payer: MEDICAID

## 2019-09-01 DIAGNOSIS — E01.0 THYROMEGALY: ICD-10-CM

## 2019-09-01 PROCEDURE — 76536 US EXAM OF HEAD AND NECK: CPT | Performed by: EMERGENCY MEDICINE

## 2019-09-06 ENCOUNTER — TELEPHONE (OUTPATIENT)
Dept: NEUROLOGY | Facility: CLINIC | Age: 29
End: 2019-09-06

## 2019-09-06 ENCOUNTER — OFFICE VISIT (OUTPATIENT)
Dept: NEUROLOGY | Facility: CLINIC | Age: 29
End: 2019-09-06
Payer: MEDICAID

## 2019-09-06 VITALS
HEART RATE: 70 BPM | RESPIRATION RATE: 16 BRPM | WEIGHT: 197 LBS | DIASTOLIC BLOOD PRESSURE: 72 MMHG | SYSTOLIC BLOOD PRESSURE: 120 MMHG | BODY MASS INDEX: 36 KG/M2

## 2019-09-06 DIAGNOSIS — G89.29 CHRONIC MIDLINE LOW BACK PAIN WITHOUT SCIATICA: ICD-10-CM

## 2019-09-06 DIAGNOSIS — G37.9 DEMYELINATING LESION (HCC): ICD-10-CM

## 2019-09-06 DIAGNOSIS — G35 MULTIPLE SCLEROSIS (HCC): Primary | ICD-10-CM

## 2019-09-06 DIAGNOSIS — G35 RELAPSING REMITTING MULTIPLE SCLEROSIS (HCC): Primary | ICD-10-CM

## 2019-09-06 DIAGNOSIS — M54.50 CHRONIC MIDLINE LOW BACK PAIN WITHOUT SCIATICA: ICD-10-CM

## 2019-09-06 PROCEDURE — 99215 OFFICE O/P EST HI 40 MIN: CPT | Performed by: OTHER

## 2019-09-06 NOTE — PROGRESS NOTES
[facility]   Neurology- f/u    Lev Saenz Patient Status:  No patient class for patient encounter    1990 MRN [id number]   [facility] PCP Robyn Sawyer MD regions of demyelination/MS. Please correlate clinically. 2. Unremarkable MR angiography of the head and neck. MRI cervical 8/2019  CONCLUSION:       1. No focal spinal cord signal abnormality identified.   No abnormal enhancement in the cervical spine use drugs. Allergies:  No Known Allergies    MEDICATIONS:    Current Outpatient Medications:   •  Cholecalciferol 4000 units Oral Cap, Take 4,000 Units by mouth daily. , Disp: 30 capsule, Rfl: 2      Review of Systems:   A comprehensive 10 point review o PROBLEM LIST:   Relapsing remitting multiple sclerosis (hcc)  (primary encounter diagnosis)  Chronic midline low back pain without sciatica    Discussion/Plan:  Meets Fernández's criteria for MS, with intracranial typical shaped lesions, clinical episode of

## 2019-09-06 NOTE — TELEPHONE ENCOUNTER
Letter signed and printed. Awaiting signed CHANNING and appeal will be faxed.  All paperwork placed in appeals to be done bin

## 2019-09-06 NOTE — TELEPHONE ENCOUNTER
Letter for Tecfidera pended for review and signature and routed to provider.     Patient is [facility] and will need a signed CHANNING for appeal.     Once letter signed and CHANNING signed, appeal will need to be faxed to [phone]

## 2019-09-06 NOTE — PROGRESS NOTES
The patient states no changes since her last office visit. The patient is having lower back pain. Recent lower back pain started in mid August. Patient denies any numbness or tingling in the lower extremities.

## 2019-09-06 NOTE — TELEPHONE ENCOUNTER
Patient and Dr Delroy Aguilar decided to appeal the Tecfidera denial. Patient instructed to complete CHANNING appeal (on Medicare website) form and fax to [facility]. Patient verbalized understanding.

## 2019-09-13 NOTE — TELEPHONE ENCOUNTER
Spoke with patient who states she forgot to print CHANNING allowing HUGO to act on her behalf. Patient states will complete form and fax to [facility] ASAP.

## 2019-09-19 ENCOUNTER — PATIENT MESSAGE (OUTPATIENT)
Dept: FAMILY MEDICINE CLINIC | Facility: CLINIC | Age: 29
End: 2019-09-19

## 2019-09-19 DIAGNOSIS — E04.2 MULTIPLE THYROID NODULES: Primary | ICD-10-CM

## 2019-09-20 NOTE — TELEPHONE ENCOUNTER
From: Latanya Baxter  To: Steven Sweeney MD  Sent: 9/19/2019 11:25 PM CDT  Subject: Test Results Question    I have a question about US THYROID (TCI=[id number]) resulted on 9/2/19 at 9:51 AM.    Could you send a referral for a specialist?

## 2019-09-23 ENCOUNTER — OFFICE VISIT (OUTPATIENT)
Dept: FAMILY MEDICINE CLINIC | Facility: CLINIC | Age: 29
End: 2019-09-23
Payer: MEDICAID

## 2019-09-23 VITALS
WEIGHT: 194 LBS | HEART RATE: 81 BPM | DIASTOLIC BLOOD PRESSURE: 78 MMHG | OXYGEN SATURATION: 97 % | RESPIRATION RATE: 17 BRPM | BODY MASS INDEX: 35 KG/M2 | SYSTOLIC BLOOD PRESSURE: 118 MMHG

## 2019-09-23 DIAGNOSIS — M54.50 CHRONIC MIDLINE LOW BACK PAIN WITHOUT SCIATICA: Primary | ICD-10-CM

## 2019-09-23 DIAGNOSIS — Z23 NEED FOR VACCINATION: ICD-10-CM

## 2019-09-23 DIAGNOSIS — G89.29 CHRONIC MIDLINE LOW BACK PAIN WITHOUT SCIATICA: Primary | ICD-10-CM

## 2019-09-23 PROCEDURE — 90471 IMMUNIZATION ADMIN: CPT | Performed by: EMERGENCY MEDICINE

## 2019-09-23 PROCEDURE — 90686 IIV4 VACC NO PRSV 0.5 ML IM: CPT | Performed by: EMERGENCY MEDICINE

## 2019-09-23 PROCEDURE — 99213 OFFICE O/P EST LOW 20 MIN: CPT | Performed by: EMERGENCY MEDICINE

## 2019-09-23 RX ORDER — CHOLECALCIFEROL (VITAMIN D3) 50 MCG
TABLET ORAL DAILY
COMMUNITY
End: 2021-12-28

## 2019-09-23 RX ORDER — DICLOFENAC SODIUM 75 MG/1
75 TABLET, DELAYED RELEASE ORAL 2 TIMES DAILY PRN
Qty: 60 TABLET | Refills: 0 | Status: SHIPPED | OUTPATIENT
Start: 2019-09-23 | End: 2019-10-02

## 2019-09-23 RX ORDER — BACLOFEN 10 MG/1
TABLET ORAL 3 TIMES DAILY PRN
Qty: 30 TABLET | Refills: 1 | Status: SHIPPED | OUTPATIENT
Start: 2019-09-23 | End: 2019-11-21 | Stop reason: ALTCHOICE

## 2019-09-23 NOTE — PATIENT INSTRUCTIONS
Thank you for choosing [facility]  To Do:  FOR Angeline Hanson    · Arrange for physical therapy  · Ok to take Baclofen as needed  · Gentle massage and rest  · May try Diclofenac as needed for pain  · Follow up with EENT as directed for thy rolled-up towel as needed. Seek medical care right away if:  · You can't stand or walk. · You have a temperature over 100.4°F (38.0°C)  · You have frequent, painful, or bloody urination. · You have severe abdominal pain.   · You have a sharp, stabbing

## 2019-09-23 NOTE — PROGRESS NOTES
Chief Complaint:   Patient presents with:  Low Back Pain: C/o low back pauin     HPI:   This is a 34year old female       BACK PAIN  C/O back pain x approx 2 years. Pain on and off, but recently happening more frequently.  Pain ongoing for the past 2-3 d Estimated body mass index is 35.48 kg/m² as calculated from the following:    Height as of 8/2/19: 62\". Weight as of this encounter: 194 lb. Vital signs reviewed. Appears stated age, well groomed.   GENERAL: well developed, well nourished, well hydrat pain  · Follow up with [facility] as directed for thyroid nodules

## 2019-09-25 ENCOUNTER — HOSPITAL ENCOUNTER (OUTPATIENT)
Dept: ULTRASOUND IMAGING | Age: 29
Discharge: HOME | End: 2019-09-25
Attending: EMERGENCY MEDICINE
Payer: MEDICAID

## 2019-09-25 DIAGNOSIS — R10.32 LLQ ABDOMINAL PAIN: ICD-10-CM

## 2019-09-25 PROCEDURE — 76830 TRANSVAGINAL US NON-OB: CPT | Performed by: EMERGENCY MEDICINE

## 2019-09-25 PROCEDURE — 76856 US EXAM PELVIC COMPLETE: CPT | Performed by: EMERGENCY MEDICINE

## 2019-09-27 ENCOUNTER — TELEPHONE (OUTPATIENT)
Dept: FAMILY MEDICINE CLINIC | Facility: CLINIC | Age: 29
End: 2019-09-27

## 2019-09-27 NOTE — TELEPHONE ENCOUNTER
Spoke with pt regarding results and instructions listed below. Pt states the pain last occurred on 09/16/19, pt has had no other symptoms. No pain.     Pt informed to contact the office if the pain reoccurs for instruction and possibly additional imaging, s

## 2019-09-27 NOTE — TELEPHONE ENCOUNTER
US PELVIS EV (TRNS ABD AND ENDOVAG)  Notes recorded by Lorelei Arcos MD on 9/27/2019 at 11:48 AM CDT  US shows no acute findings    If patient is still having left lower quadrant pain and symptoms please order CT scan of abdomen and pelvis

## 2019-10-02 ENCOUNTER — HOSPITAL ENCOUNTER (EMERGENCY)
Age: 29
Discharge: HOME | End: 2019-10-02
Attending: EMERGENCY MEDICINE
Payer: MEDICAID

## 2019-10-02 ENCOUNTER — APPOINTMENT (OUTPATIENT)
Dept: GENERAL RADIOLOGY | Age: 29
End: 2019-10-02
Attending: EMERGENCY MEDICINE
Payer: MEDICAID

## 2019-10-02 VITALS
WEIGHT: 195 LBS | HEIGHT: 62 IN | TEMPERATURE: 98 F | HEART RATE: 78 BPM | OXYGEN SATURATION: 99 % | DIASTOLIC BLOOD PRESSURE: 75 MMHG | RESPIRATION RATE: 18 BRPM | BODY MASS INDEX: 35.88 KG/M2 | SYSTOLIC BLOOD PRESSURE: 121 MMHG

## 2019-10-02 DIAGNOSIS — M54.50 CHRONIC MIDLINE LOW BACK PAIN WITHOUT SCIATICA: Primary | ICD-10-CM

## 2019-10-02 DIAGNOSIS — G89.29 CHRONIC MIDLINE LOW BACK PAIN WITHOUT SCIATICA: Primary | ICD-10-CM

## 2019-10-02 PROCEDURE — 81001 URINALYSIS AUTO W/SCOPE: CPT | Performed by: EMERGENCY MEDICINE

## 2019-10-02 PROCEDURE — 72110 X-RAY EXAM L-2 SPINE 4/>VWS: CPT | Performed by: EMERGENCY MEDICINE

## 2019-10-02 PROCEDURE — 99284 EMERGENCY DEPT VISIT MOD MDM: CPT

## 2019-10-02 PROCEDURE — 96372 THER/PROPH/DIAG INJ SC/IM: CPT

## 2019-10-02 PROCEDURE — 99283 EMERGENCY DEPT VISIT LOW MDM: CPT

## 2019-10-02 RX ORDER — KETOROLAC TROMETHAMINE 10 MG/1
10 TABLET, FILM COATED ORAL EVERY 6 HOURS PRN
Qty: 30 TABLET | Refills: 0 | Status: SHIPPED | OUTPATIENT
Start: 2019-10-02 | End: 2019-10-09

## 2019-10-02 RX ORDER — KETOROLAC TROMETHAMINE 30 MG/ML
60 INJECTION, SOLUTION INTRAMUSCULAR; INTRAVENOUS ONCE
Status: COMPLETED | OUTPATIENT
Start: 2019-10-02 | End: 2019-10-02

## 2019-10-03 NOTE — ED INITIAL ASSESSMENT (HPI)
Pt reports LUQ abd pain over the past couple months but worse in the past couple days. Pt also reports low back pain radiating down rt upper leg for the past couple days with hx of back pain for about one year.

## 2019-10-03 NOTE — ED PROVIDER NOTES
Patient Seen in: [facility] Emergency Department In [place]      History   Patient presents with:  Back Pain (musculoskeletal)  Abdomen/Flank Pain (GI/)    Stated Complaint: low back pain with LLQ abd pain    HPI    44-year-old woman with lower back kurt Pulse 78   Resp 18   Temp 97.6 °F (36.4 °C)   Temp src Temporal   SpO2 99 %   O2 Device None (Room air)       Current:/75   Pulse 78   Temp 97.6 °F (36.4 °C) (Temporal)   Resp 18   Ht 157.5 cm (5' 2\")   Wt 88.5 kg   SpO2 99%   BMI 35.67 kg/m² (CPT=62270,49275), 8/12/2019,     10:25. INDICATIONS:  low back pain with LLQ abd pain         PATIENT STATED HISTORY: (As transcribed by Technologist)  Patient complain     of chronic back pain for (2)years, right lower leg pain for (1)day.

## 2019-10-07 ENCOUNTER — TELEPHONE (OUTPATIENT)
Dept: FAMILY MEDICINE CLINIC | Facility: CLINIC | Age: 29
End: 2019-10-07

## 2019-10-07 PROBLEM — E04.1 THYROID NODULE: Status: ACTIVE | Noted: 2019-10-07

## 2019-10-07 NOTE — TELEPHONE ENCOUNTER
Appeal letter and CHANNING faxed to [facility] appeals. Fax confirmation received. Copy placed in pending appeals file.

## 2019-10-09 ENCOUNTER — TELEPHONE (OUTPATIENT)
Dept: NEUROLOGY | Facility: CLINIC | Age: 29
End: 2019-10-09

## 2019-10-09 RX ORDER — METHYLPREDNISOLONE 4 MG/1
TABLET ORAL
Qty: 1 PACKAGE | Refills: 0 | Status: SHIPPED | OUTPATIENT
Start: 2019-10-09 | End: 2019-11-21 | Stop reason: ALTCHOICE

## 2019-10-09 RX ORDER — RIZATRIPTAN BENZOATE 10 MG/1
TABLET, ORALLY DISINTEGRATING ORAL
Qty: 12 TABLET | Refills: 0 | Status: SHIPPED | OUTPATIENT
Start: 2019-10-09 | End: 2019-10-20

## 2019-10-09 NOTE — TELEPHONE ENCOUNTER
Spoke to patient. Light sensitivity, worse with activity. Will try maxalt and medrol dose pack. Instructed her to keep hydrated, try to sleep a lot. Ice on neck. She will try the prescriptions.

## 2019-10-09 NOTE — TELEPHONE ENCOUNTER
Pt with HX of MS called in to state that she has been experiencing intermittent HA for 4 days. She states pain waxes and wanes in intensity from 4 to 8, She also states pain is located in left temple and right occipital areas.  She states she did experience

## 2019-10-11 NOTE — TELEPHONE ENCOUNTER
Spoke with Ofelia from [facility] and they would like copy of LOV - signed CHANNING on file. Faxed LOV notes to [phone]. Awaiting fax confirmation.

## 2019-10-14 ENCOUNTER — APPOINTMENT (OUTPATIENT)
Dept: PHYSICAL THERAPY | Age: 29
End: 2019-10-14
Attending: Other
Payer: MEDICAID

## 2019-10-14 ENCOUNTER — TELEPHONE (OUTPATIENT)
Dept: PHYSICAL THERAPY | Age: 29
End: 2019-10-14

## 2019-10-14 NOTE — TELEPHONE ENCOUNTER
The patient did not show or call to today's session. This is the third consecutive no-show appointment and she will be removed from the schedule.

## 2019-10-15 NOTE — TELEPHONE ENCOUNTER
Received letter from [facility] stating that appeal has been received and determination will be sent to HUGO WARDT 10/28/2019.

## 2019-10-16 ENCOUNTER — APPOINTMENT (OUTPATIENT)
Dept: PHYSICAL THERAPY | Age: 29
End: 2019-10-16
Attending: Other
Payer: MEDICAID

## 2019-10-20 DIAGNOSIS — G35 MULTIPLE SCLEROSIS (HCC): Primary | ICD-10-CM

## 2019-10-21 ENCOUNTER — APPOINTMENT (OUTPATIENT)
Dept: PHYSICAL THERAPY | Age: 29
End: 2019-10-21
Attending: Other
Payer: MEDICAID

## 2019-10-21 RX ORDER — RIZATRIPTAN BENZOATE 10 MG/1
TABLET, ORALLY DISINTEGRATING ORAL
Qty: 12 TABLET | Refills: 0 | Status: SHIPPED | OUTPATIENT
Start: 2019-10-21 | End: 2019-11-20

## 2019-10-21 NOTE — TELEPHONE ENCOUNTER
Pt also sent CM Sistemi Message requesting refill of Rizatriptan. Rizatriptan last prescribed on 10/01/2019, pt states she has only taken 7 tabs of current fill, she states she would like a refill available at pharmacy if needed.     [facility]

## 2019-10-22 ENCOUNTER — HOSPITAL ENCOUNTER (OUTPATIENT)
Dept: ULTRASOUND IMAGING | Facility: HOSPITAL | Age: 29
Discharge: HOME | End: 2019-10-22
Attending: OTOLARYNGOLOGY
Payer: MEDICAID

## 2019-10-22 DIAGNOSIS — E04.1 THYROID NODULE: ICD-10-CM

## 2019-10-22 PROCEDURE — 88173 CYTOPATH EVAL FNA REPORT: CPT | Performed by: OTOLARYNGOLOGY

## 2019-10-22 PROCEDURE — 10005 FNA BX W/US GDN 1ST LES: CPT | Performed by: OTOLARYNGOLOGY

## 2019-10-23 ENCOUNTER — APPOINTMENT (OUTPATIENT)
Dept: PHYSICAL THERAPY | Age: 29
End: 2019-10-23
Attending: Other
Payer: MEDICAID

## 2019-10-24 ENCOUNTER — TELEPHONE (OUTPATIENT)
Dept: NEUROLOGY | Facility: CLINIC | Age: 29
End: 2019-10-24

## 2019-10-24 DIAGNOSIS — G35 MULTIPLE SCLEROSIS (HCC): Primary | ICD-10-CM

## 2019-10-30 ENCOUNTER — APPOINTMENT (OUTPATIENT)
Dept: PHYSICAL THERAPY | Age: 29
End: 2019-10-30
Attending: Other
Payer: MEDICAID

## 2019-10-30 NOTE — TELEPHONE ENCOUNTER
Received a letter in office Denying the appeal for Tecfidera. All denial paperwork, appeal letter, patient face sheet, insurance information and start paperwork faxed to [employer] to determine if patient will qualify for [employer] assistance.  Fax confirmation

## 2019-11-04 ENCOUNTER — APPOINTMENT (OUTPATIENT)
Dept: PHYSICAL THERAPY | Age: 29
End: 2019-11-04
Attending: Other
Payer: MEDICAID

## 2019-11-08 NOTE — TELEPHONE ENCOUNTER
Received fax from [employer] stating that patient's Diana Almaraz has shipped.      [employer] Patient ID number is [id number]

## 2019-11-19 ENCOUNTER — PATIENT MESSAGE (OUTPATIENT)
Dept: NEUROLOGY | Facility: CLINIC | Age: 29
End: 2019-11-19

## 2019-11-19 DIAGNOSIS — G35 MULTIPLE SCLEROSIS (HCC): ICD-10-CM

## 2019-11-19 NOTE — TELEPHONE ENCOUNTER
From: Kyle Park  To: Nichole Becker DO  Sent: 11/19/2019 4:30 PM CST  Subject: Prescription Question    I wanted to know is it safe to take that headache medicine you prescribed me with the Tecfidera?  If so could I get another prescription

## 2019-11-19 NOTE — TELEPHONE ENCOUNTER
Medication: Rizatriptan    Date of last refill: 10/21/19 (#12/0)  [place]     Last office visit: 9/6/2019  Due back to clinic per last office note:  10 weeks  Date next office visit scheduled:    Future Appointments   Da

## 2019-11-20 RX ORDER — RIZATRIPTAN BENZOATE 10 MG/1
TABLET, ORALLY DISINTEGRATING ORAL
Qty: 12 TABLET | Refills: 0 | Status: SHIPPED | OUTPATIENT
Start: 2019-11-20 | End: 2020-11-22

## 2019-11-21 ENCOUNTER — LAB ENCOUNTER (OUTPATIENT)
Dept: LAB | Age: 29
End: 2019-11-21
Attending: Other
Payer: MEDICAID

## 2019-11-21 ENCOUNTER — OFFICE VISIT (OUTPATIENT)
Dept: FAMILY MEDICINE CLINIC | Facility: CLINIC | Age: 29
End: 2019-11-21
Payer: MEDICAID

## 2019-11-21 VITALS
RESPIRATION RATE: 17 BRPM | WEIGHT: 206 LBS | BODY MASS INDEX: 38 KG/M2 | HEART RATE: 77 BPM | OXYGEN SATURATION: 99 % | DIASTOLIC BLOOD PRESSURE: 62 MMHG | SYSTOLIC BLOOD PRESSURE: 110 MMHG

## 2019-11-21 DIAGNOSIS — G37.9 DEMYELINATING LESION (HCC): ICD-10-CM

## 2019-11-21 DIAGNOSIS — G47.9 SLEEPING DIFFICULTIES: Primary | ICD-10-CM

## 2019-11-21 DIAGNOSIS — G35 MULTIPLE SCLEROSIS (HCC): ICD-10-CM

## 2019-11-21 PROCEDURE — 84460 ALANINE AMINO (ALT) (SGPT): CPT

## 2019-11-21 PROCEDURE — 99213 OFFICE O/P EST LOW 20 MIN: CPT | Performed by: EMERGENCY MEDICINE

## 2019-11-21 PROCEDURE — 36415 COLL VENOUS BLD VENIPUNCTURE: CPT

## 2019-11-21 PROCEDURE — 85025 COMPLETE CBC W/AUTO DIFF WBC: CPT

## 2019-11-21 RX ORDER — DIMETHYL FUMARATE 240 MG/1
CAPSULE ORAL 2 TIMES DAILY
COMMUNITY
End: 2020-08-27

## 2019-11-21 RX ORDER — AMITRIPTYLINE HYDROCHLORIDE 25 MG/1
25 TABLET, FILM COATED ORAL NIGHTLY
Qty: 30 TABLET | Refills: 1 | Status: SHIPPED | OUTPATIENT
Start: 2019-11-21 | End: 2020-01-24

## 2019-11-21 NOTE — PATIENT INSTRUCTIONS
Thank you for choosing [facility]  To Do:  FOR [employer]    · Ok to try bendryl first, If with no help, Ok to start Amytriptyline  · Follow up in 1 month if with persistent Sx  · Keep a symptom diary    Sleep and Women: Life Stages  A massage restless legs. · Avoid or limit coffee, black tea, and cola. These may keep you awake at night. · Try relaxation techniques. Motherhood  · Ask for help when you need it. Accept help when it’s offered.   · Many new mothers feel a little down for a wake up often during the night? Or maybe you wake up too early in the morning. You may be suffering from insomnia. Talk with your healthcare provider if it lasts longer than a few weeks and you feel tired most of the time. What causes insomnia?   Some comm mouth with a full glass of water. Follow the directions on the prescription label. Do not take your medicine more often than directed. Talk to your pediatrician regarding the use of this medicine in children.  While this drug may be prescribed for children should I keep my medicine? Keep out of the reach of children. Store at room temperature between 20 and 25 degrees C (68 and 77 degrees F). Keep container closed tightly. Throw away any unused medicine after the expiration date.   What should I tell my hea

## 2019-11-21 NOTE — PROGRESS NOTES
Chief Complaint:   Patient presents with:  Sleep Problem: C/o waking up at night X 2 months    HPI:   This is a 34year old female     [address] to fall asleep but unable to stay sleep.    No excessive caffeine intake  Denies any anxiety  ETOH: n primary birth control method     Thyromegaly     Obesity (BMI 30-39. 9)     Left arm weakness     Chronic midline low back pain without sciatica     Thyroid nodule     Multiple sclerosis (HCC)        REVIEW OF SYSTEMS:   Review of systems significant for po asymptomatic.        PATIENT INSTRUCTIONS:    · Ok to try bendryl first, If with no help, Ok to start Amytriptyline  · Follow up in 1 month if with persistent Sx  · Keep a symptom diary  ·   FOLLOW UP: 1 month if not better

## 2019-12-31 ENCOUNTER — PATIENT MESSAGE (OUTPATIENT)
Dept: NEUROLOGY | Facility: CLINIC | Age: 29
End: 2019-12-31

## 2019-12-31 DIAGNOSIS — G35 MULTIPLE SCLEROSIS (HCC): Primary | ICD-10-CM

## 2019-12-31 DIAGNOSIS — R20.0 NUMBNESS OF TOES: ICD-10-CM

## 2020-01-02 NOTE — TELEPHONE ENCOUNTER
From: Dar Harmon  To: Reji Frausto DO  Sent: 12/31/2019 11:22 PM CST  Subject: Non-Urgent Medical Question    I took some baclofin and my Tecfidera tonight and for some reason I have no feeling on the top of my left big toe.  I don't know

## 2020-01-13 ENCOUNTER — PATIENT MESSAGE (OUTPATIENT)
Dept: FAMILY MEDICINE CLINIC | Facility: CLINIC | Age: 30
End: 2020-01-13

## 2020-01-13 NOTE — TELEPHONE ENCOUNTER
Tonsils are usually taken out if it is a constant source of infection and causes recurrent / frequent strep  Tonsils and adenoids are also removed for recurrent ear infections in children  In children, They are also taken out if you have documented sleep a

## 2020-01-13 NOTE — TELEPHONE ENCOUNTER
Patient is asking what she needs to do to have her tonsils removed. Patient has seen ENT in the past but for thyroid nodules. Last office visit in our office was for insomnia. Please advise. Thank you!

## 2020-01-13 NOTE — TELEPHONE ENCOUNTER
From: Jose Alfredo Parrish  To: Christine Grey MD  Sent: 1/13/2020 7:40 AM CST  Subject: Non-Urgent Medical Question    What has to happen for me to get my tonsils taken out?

## 2020-01-15 NOTE — TELEPHONE ENCOUNTER
[facility] calling to check status   Received Authorization Fax from [address] 40mg sq injections  Approved     Case #:[id number]    Valid Dates:07/01/19 - 09/06/20

## 2020-01-21 DIAGNOSIS — G47.9 SLEEPING DIFFICULTIES: ICD-10-CM

## 2020-01-21 NOTE — TELEPHONE ENCOUNTER
Medication(s) to Refill:   Requested Prescriptions     Pending Prescriptions Disp Refills   • Amitriptyline HCl 25 MG Oral Tab 30 tablet 1     Sig: Take 1 tablet (25 mg total) by mouth nightly.          Reason for Medication Refill being sent to Provider /

## 2020-01-22 ENCOUNTER — OFFICE VISIT (OUTPATIENT)
Dept: NEUROLOGY | Facility: CLINIC | Age: 30
End: 2020-01-22
Payer: MEDICAID

## 2020-01-22 VITALS
RESPIRATION RATE: 16 BRPM | DIASTOLIC BLOOD PRESSURE: 62 MMHG | SYSTOLIC BLOOD PRESSURE: 104 MMHG | BODY MASS INDEX: 38 KG/M2 | WEIGHT: 206 LBS | HEART RATE: 66 BPM

## 2020-01-22 DIAGNOSIS — G35 MULTIPLE SCLEROSIS (HCC): Primary | ICD-10-CM

## 2020-01-22 DIAGNOSIS — R20.8 NUMBNESS OF LEFT FOOT: ICD-10-CM

## 2020-01-22 PROCEDURE — 99213 OFFICE O/P EST LOW 20 MIN: CPT | Performed by: OTHER

## 2020-01-22 NOTE — PATIENT INSTRUCTIONS
After your visit at the [facility] office  today,  please direct any follow up questions or medication needs to the staff in our  [place] office so that your concerns may be promptly addressed.   We are available through [employer] or at the numbers below: must be picked up in office. • Please allow the office 2-3 business days to fill the prescription. • Patient must present photo ID at time of . PLEASE NOTE: PRESCRIPTIONS MUST BE PICKED UP PRIOR TO 3:00PM MONDAY-FRIDAY    Scheduling Tests:     If submitting forms to office staff. • Form completion may require an additional fee. • A signed Release of Information (CHANNING) must be on file before forms may be submitted. When dropping off forms, please ask the  for this paper.    • Failure

## 2020-01-22 NOTE — PROGRESS NOTES
[address]   Neurology- f/u    Dar Harmon Patient Status:  No patient class for patient encounter    1990 MRN [id number]   Location Camila Braun MD work-up:   MRI brain/MRA ww/o contrast 7/30/19  CONCLUSION:    1.  There are 2 ovoid lesions of high T2/FLAIR signal within the right hemisphere involving the periventricular white matter with associated mild restriction of diffusion as well as mild post-co APPENDECTOMY N/A 2/14/2019    Performed by Ramin Abdalla DO at [facility] MAIN OR       Family History:  family history includes Diabetes in her father and paternal grandfather; Hypertension in her father. Social History:   reports that she has never smoked. muscle bulk and tone. No atrophy or fasciculations. Manual muscle testing revealed MRC grade 5/5 strength throughout including proximal and distal muscles of the arms and legs.   Deep tendon reflexes were 2 at the biceps, brachioradialis, triceps, 3+ b/l kn

## 2020-01-24 RX ORDER — AMITRIPTYLINE HYDROCHLORIDE 25 MG/1
25 TABLET, FILM COATED ORAL NIGHTLY
Qty: 30 TABLET | Refills: 1 | Status: SHIPPED | OUTPATIENT
Start: 2020-01-24 | End: 2021-12-28

## 2020-01-27 ENCOUNTER — PATIENT MESSAGE (OUTPATIENT)
Dept: FAMILY MEDICINE CLINIC | Facility: CLINIC | Age: 30
End: 2020-01-27

## 2020-01-27 NOTE — TELEPHONE ENCOUNTER
From: Nohemy Snider  To: Aleja Bhatti MD  Sent: 1/27/2020 11:20 AM CST  Subject: Non-Urgent Medical Question    Could you tell me when my last depo shot was?

## 2020-01-30 ENCOUNTER — HOSPITAL ENCOUNTER (OUTPATIENT)
Dept: MRI IMAGING | Age: 30
Discharge: HOME | End: 2020-01-30
Attending: Other
Payer: MEDICAID

## 2020-01-30 DIAGNOSIS — G35 MULTIPLE SCLEROSIS (HCC): ICD-10-CM

## 2020-01-30 DIAGNOSIS — R20.0 NUMBNESS OF TOES: ICD-10-CM

## 2020-01-30 PROCEDURE — 72156 MRI NECK SPINE W/O & W/DYE: CPT | Performed by: OTHER

## 2020-01-30 PROCEDURE — 70553 MRI BRAIN STEM W/O & W/DYE: CPT | Performed by: OTHER

## 2020-01-30 PROCEDURE — A9575 INJ GADOTERATE MEGLUMI 0.1ML: HCPCS | Performed by: OTHER

## 2020-01-30 PROCEDURE — 72157 MRI CHEST SPINE W/O & W/DYE: CPT | Performed by: OTHER

## 2020-07-21 ENCOUNTER — PATIENT MESSAGE (OUTPATIENT)
Dept: NEUROLOGY | Facility: CLINIC | Age: 30
End: 2020-07-21

## 2020-07-21 ENCOUNTER — PATIENT MESSAGE (OUTPATIENT)
Dept: FAMILY MEDICINE CLINIC | Facility: CLINIC | Age: 30
End: 2020-07-21

## 2020-07-21 NOTE — TELEPHONE ENCOUNTER
From: Jaren Estrada  To: Naina Mendieta MD  Sent: 7/21/2020 5:02 PM CDT  Subject: Other    Hello I was wondering if there anyway I could get an email of my medical records?

## 2020-07-22 ENCOUNTER — TELEPHONE (OUTPATIENT)
Dept: NEUROLOGY | Facility: CLINIC | Age: 30
End: 2020-07-22

## 2020-07-22 NOTE — TELEPHONE ENCOUNTER
From: Lexx Kowalski  To: Lyle Amaya DO  Sent: 7/21/2020 5:01 PM CDT  Subject: Other    Hello I was wondering if there anyway I can get an email of my medical records?

## 2020-07-24 ENCOUNTER — TELEPHONE (OUTPATIENT)
Dept: FAMILY MEDICINE CLINIC | Facility: CLINIC | Age: 30
End: 2020-07-24

## 2020-08-17 NOTE — ED PROVIDER NOTES
Patient Seen in: [facility] Emergency Department In [place]    History   Patient presents with:  Abdomen/Flank Pain (GI/)    Stated Complaint: abd pain    HPI    Patient complains of abdominal pain.   Patient reports more intermittent discomforts in her Refill methylphenidate 20 mg tablet  NA 09/10   S2, without murmur or rub. Distal pulses are strong and symmetric. Abdomen: Soft, nondistended. Tender in the right lower quadrant without involuntary guarding, rigidity, or rebound. No CVA tenderness  Skin: Unremarkable.   No skin change or skin rash i She is febrile and has significant right lower quadrant tenderness.     Patient treated with IV fluid and closely monitored    CBC shows elevated white count with a left shift on differential.  Hematocrit and platelets are normal  Metabolic panel shows mini diagnosis)    Disposition:  Admit  2/13/2019 10:12 pm    Follow-up:  No follow-up provider specified. Medications Prescribed:  There are no discharge medications for this patient.       Present on Admission           ICD-10-CM Noted POA    Abdominal pa

## 2020-08-27 ENCOUNTER — TELEPHONE (OUTPATIENT)
Dept: NEUROLOGY | Facility: CLINIC | Age: 30
End: 2020-08-27

## 2020-08-27 RX ORDER — DIMETHYL FUMARATE 240 MG/1
240 CAPSULE ORAL 2 TIMES DAILY
Qty: 60 CAPSULE | Refills: 3 | COMMUNITY
Start: 2020-08-27 | End: 2021-12-28

## 2020-11-22 ENCOUNTER — HOSPITAL ENCOUNTER (EMERGENCY)
Age: 30
Discharge: HOME | End: 2020-11-22
Attending: EMERGENCY MEDICINE
Payer: MEDICAID

## 2020-11-22 VITALS
WEIGHT: 217 LBS | HEIGHT: 63 IN | TEMPERATURE: 99 F | SYSTOLIC BLOOD PRESSURE: 104 MMHG | BODY MASS INDEX: 38.45 KG/M2 | OXYGEN SATURATION: 100 % | HEART RATE: 98 BPM | DIASTOLIC BLOOD PRESSURE: 74 MMHG | RESPIRATION RATE: 18 BRPM

## 2020-11-22 DIAGNOSIS — Z20.822 PERSON UNDER INVESTIGATION FOR COVID-19: Primary | ICD-10-CM

## 2020-11-22 PROCEDURE — 99283 EMERGENCY DEPT VISIT LOW MDM: CPT

## 2020-11-22 PROCEDURE — 87502 INFLUENZA DNA AMP PROBE: CPT | Performed by: PHYSICIAN ASSISTANT

## 2020-11-22 NOTE — ED PROVIDER NOTES
Patient Seen in: THE [facility] Emergency Department In [place]      History   Patient presents with:  Testing    Stated Complaint: Per pt \"chills and body aches since lastnight\" wants covid test     32-year-old obese -American female with a history note reviewed. Constitutional:       General: She is not in acute distress. Appearance: Normal appearance. She is obese. She is not ill-appearing, toxic-appearing or diaphoretic. Cardiovascular:      Rate and Rhythm: Normal rate and regular rhythm.

## 2020-11-22 NOTE — ED INITIAL ASSESSMENT (HPI)
REPORTS BODY ACHES AND CHILLS AND A FEVER SINCE LAST NOC.   HEADACHE STARTED THIS AM.  CAME BACK FROM [place] 1 WEEK AGO

## 2021-04-09 DIAGNOSIS — Z23 NEED FOR VACCINATION: ICD-10-CM

## 2021-08-11 NOTE — LETTER
Date: 10/5/2023    Patient Name: Angeles Putnam          To Whom it may concern: This letter has been written at the patient's request. The above patient was seen at the [facility] for treatment of a medical condition. This patient should be excused from attending work from 10/5/23 through 10/6/23. The patient may return to work on 10/7/23 with no limitations.         Sincerely,    SANIA Jones 2

## 2021-12-27 ENCOUNTER — ANESTHESIA EVENT (OUTPATIENT)
Dept: SURGERY | Facility: HOSPITAL | Age: 31
DRG: 419 | End: 2021-12-27
Payer: COMMERCIAL

## 2021-12-27 ENCOUNTER — HOSPITAL ENCOUNTER (INPATIENT)
Facility: HOSPITAL | Age: 31
LOS: 1 days | Discharge: HOME | DRG: 419 | End: 2021-12-28
Attending: EMERGENCY MEDICINE | Admitting: SURGERY
Payer: COMMERCIAL

## 2021-12-27 ENCOUNTER — APPOINTMENT (OUTPATIENT)
Dept: GENERAL RADIOLOGY | Facility: HOSPITAL | Age: 31
DRG: 419 | End: 2021-12-27
Attending: SURGERY
Payer: COMMERCIAL

## 2021-12-27 ENCOUNTER — APPOINTMENT (OUTPATIENT)
Dept: ULTRASOUND IMAGING | Age: 31
DRG: 419 | End: 2021-12-27
Attending: EMERGENCY MEDICINE
Payer: COMMERCIAL

## 2021-12-27 ENCOUNTER — ANESTHESIA (OUTPATIENT)
Dept: SURGERY | Facility: HOSPITAL | Age: 31
DRG: 419 | End: 2021-12-27
Payer: COMMERCIAL

## 2021-12-27 DIAGNOSIS — K80.20 CHOLELITHIASIS: ICD-10-CM

## 2021-12-27 DIAGNOSIS — E87.6 HYPOKALEMIA: ICD-10-CM

## 2021-12-27 DIAGNOSIS — K80.00 CALCULUS OF GALLBLADDER WITH ACUTE CHOLECYSTITIS WITHOUT OBSTRUCTION: Primary | ICD-10-CM

## 2021-12-27 LAB
ALBUMIN SERPL-MCNC: 3.5 G/DL (ref 3.4–5)
ALBUMIN/GLOB SERPL: 0.8 {RATIO} (ref 1–2)
ALP LIVER SERPL-CCNC: 80 U/L
ALT SERPL-CCNC: 22 U/L
ANION GAP SERPL CALC-SCNC: 5 MMOL/L (ref 0–18)
AST SERPL-CCNC: 16 U/L (ref 15–37)
BASOPHILS # BLD AUTO: 0.05 X10(3) UL (ref 0–0.2)
BASOPHILS NFR BLD AUTO: 0.4 %
BILIRUB SERPL-MCNC: 0.4 MG/DL (ref 0.1–2)
BILIRUB UR QL STRIP.AUTO: NEGATIVE
BUN BLD-MCNC: 6 MG/DL (ref 7–18)
CALCIUM BLD-MCNC: 9 MG/DL (ref 8.5–10.1)
CHLORIDE SERPL-SCNC: 110 MMOL/L (ref 98–112)
CLARITY UR REFRACT.AUTO: CLEAR
CO2 SERPL-SCNC: 25 MMOL/L (ref 21–32)
COLOR UR AUTO: YELLOW
CREAT BLD-MCNC: 0.78 MG/DL
EOSINOPHIL # BLD AUTO: 0.07 X10(3) UL (ref 0–0.7)
EOSINOPHIL NFR BLD AUTO: 0.5 %
ERYTHROCYTE [DISTWIDTH] IN BLOOD BY AUTOMATED COUNT: 12.8 %
GLOBULIN PLAS-MCNC: 4.3 G/DL (ref 2.8–4.4)
GLUCOSE BLD-MCNC: 118 MG/DL (ref 70–99)
GLUCOSE UR STRIP.AUTO-MCNC: NEGATIVE MG/DL
HCG SERPL QL: NEGATIVE
HCT VFR BLD AUTO: 38.4 %
HGB BLD-MCNC: 12.4 G/DL
IMM GRANULOCYTES # BLD AUTO: 0.04 X10(3) UL (ref 0–1)
IMM GRANULOCYTES NFR BLD: 0.3 %
KETONES UR STRIP.AUTO-MCNC: 40 MG/DL
LEUKOCYTE ESTERASE UR QL STRIP.AUTO: NEGATIVE
LIPASE SERPL-CCNC: 107 U/L (ref 73–393)
LYMPHOCYTES # BLD AUTO: 1.44 X10(3) UL (ref 1–4)
LYMPHOCYTES NFR BLD AUTO: 11.1 %
MCH RBC QN AUTO: 30 PG (ref 26–34)
MCHC RBC AUTO-ENTMCNC: 32.3 G/DL (ref 31–37)
MCV RBC AUTO: 92.8 FL
MONOCYTES # BLD AUTO: 0.75 X10(3) UL (ref 0.1–1)
MONOCYTES NFR BLD AUTO: 5.8 %
NEUTROPHILS # BLD AUTO: 10.6 X10 (3) UL (ref 1.5–7.7)
NEUTROPHILS # BLD AUTO: 10.6 X10(3) UL (ref 1.5–7.7)
NEUTROPHILS NFR BLD AUTO: 81.9 %
NITRITE UR QL STRIP.AUTO: NEGATIVE
OSMOLALITY SERPL CALC.SUM OF ELEC: 289 MOSM/KG (ref 275–295)
PH UR STRIP.AUTO: 7.5 [PH] (ref 5–8)
PLATELET # BLD AUTO: 296 10(3)UL (ref 150–450)
POTASSIUM SERPL-SCNC: 3.5 MMOL/L (ref 3.5–5.1)
PROT SERPL-MCNC: 7.8 G/DL (ref 6.4–8.2)
PROT UR STRIP.AUTO-MCNC: NEGATIVE MG/DL
RBC # BLD AUTO: 4.14 X10(6)UL
SARS-COV-2 RNA RESP QL NAA+PROBE: NOT DETECTED
SODIUM SERPL-SCNC: 140 MMOL/L (ref 136–145)
SP GR UR STRIP.AUTO: 1.02 (ref 1–1.03)
UROBILINOGEN UR STRIP.AUTO-MCNC: 0.2 MG/DL
WBC # BLD AUTO: 13 X10(3) UL (ref 4–11)

## 2021-12-27 PROCEDURE — 0FT44ZZ RESECTION OF GALLBLADDER, PERCUTANEOUS ENDOSCOPIC APPROACH: ICD-10-PCS | Performed by: SURGERY

## 2021-12-27 PROCEDURE — 99253 IP/OBS CNSLTJ NEW/EST LOW 45: CPT | Performed by: INTERNAL MEDICINE

## 2021-12-27 PROCEDURE — 76000 FLUOROSCOPY <1 HR PHYS/QHP: CPT | Performed by: SURGERY

## 2021-12-27 PROCEDURE — 3078F DIAST BP <80 MM HG: CPT | Performed by: INTERNAL MEDICINE

## 2021-12-27 PROCEDURE — 76700 US EXAM ABDOM COMPLETE: CPT | Performed by: EMERGENCY MEDICINE

## 2021-12-27 PROCEDURE — 3008F BODY MASS INDEX DOCD: CPT | Performed by: INTERNAL MEDICINE

## 2021-12-27 PROCEDURE — 3074F SYST BP LT 130 MM HG: CPT | Performed by: INTERNAL MEDICINE

## 2021-12-27 PROCEDURE — BF101ZZ FLUOROSCOPY OF BILE DUCTS USING LOW OSMOLAR CONTRAST: ICD-10-PCS | Performed by: SURGERY

## 2021-12-27 RX ORDER — BISACODYL 10 MG
10 SUPPOSITORY, RECTAL RECTAL
Status: DISCONTINUED | OUTPATIENT
Start: 2021-12-27 | End: 2021-12-28

## 2021-12-27 RX ORDER — ROCURONIUM BROMIDE 10 MG/ML
INJECTION, SOLUTION INTRAVENOUS AS NEEDED
Status: DISCONTINUED | OUTPATIENT
Start: 2021-12-27 | End: 2021-12-27 | Stop reason: SURG

## 2021-12-27 RX ORDER — ONDANSETRON 2 MG/ML
4 INJECTION INTRAMUSCULAR; INTRAVENOUS ONCE
Status: COMPLETED | OUTPATIENT
Start: 2021-12-27 | End: 2021-12-27

## 2021-12-27 RX ORDER — FAMOTIDINE 20 MG/1
20 TABLET ORAL DAILY
Status: DISCONTINUED | OUTPATIENT
Start: 2021-12-27 | End: 2021-12-28

## 2021-12-27 RX ORDER — ONDANSETRON 2 MG/ML
4 INJECTION INTRAMUSCULAR; INTRAVENOUS EVERY 4 HOURS PRN
Status: DISCONTINUED | OUTPATIENT
Start: 2021-12-27 | End: 2021-12-27

## 2021-12-27 RX ORDER — CEFOXITIN 2 G/1
INJECTION, POWDER, FOR SOLUTION INTRAVENOUS AS NEEDED
Status: DISCONTINUED | OUTPATIENT
Start: 2021-12-27 | End: 2021-12-27 | Stop reason: SURG

## 2021-12-27 RX ORDER — FAMOTIDINE 10 MG/ML
20 INJECTION, SOLUTION INTRAVENOUS DAILY
Status: DISCONTINUED | OUTPATIENT
Start: 2021-12-27 | End: 2021-12-28

## 2021-12-27 RX ORDER — SODIUM CHLORIDE, SODIUM LACTATE, POTASSIUM CHLORIDE, CALCIUM CHLORIDE 600; 310; 30; 20 MG/100ML; MG/100ML; MG/100ML; MG/100ML
INJECTION, SOLUTION INTRAVENOUS CONTINUOUS
Status: DISCONTINUED | OUTPATIENT
Start: 2021-12-27 | End: 2021-12-27 | Stop reason: HOSPADM

## 2021-12-27 RX ORDER — HYDROMORPHONE HYDROCHLORIDE 1 MG/ML
0.5 INJECTION, SOLUTION INTRAMUSCULAR; INTRAVENOUS; SUBCUTANEOUS EVERY 30 MIN PRN
Status: DISCONTINUED | OUTPATIENT
Start: 2021-12-27 | End: 2021-12-27

## 2021-12-27 RX ORDER — BUPIVACAINE HYDROCHLORIDE AND EPINEPHRINE 5; 5 MG/ML; UG/ML
INJECTION, SOLUTION EPIDURAL; INTRACAUDAL; PERINEURAL AS NEEDED
Status: DISCONTINUED | OUTPATIENT
Start: 2021-12-27 | End: 2021-12-27 | Stop reason: HOSPADM

## 2021-12-27 RX ORDER — DEXAMETHASONE SODIUM PHOSPHATE 4 MG/ML
VIAL (ML) INJECTION AS NEEDED
Status: DISCONTINUED | OUTPATIENT
Start: 2021-12-27 | End: 2021-12-27 | Stop reason: SURG

## 2021-12-27 RX ORDER — ONDANSETRON 2 MG/ML
4 INJECTION INTRAMUSCULAR; INTRAVENOUS EVERY 6 HOURS PRN
Status: DISCONTINUED | OUTPATIENT
Start: 2021-12-27 | End: 2021-12-28

## 2021-12-27 RX ORDER — SENNOSIDES 8.6 MG
17.2 TABLET ORAL NIGHTLY PRN
Status: DISCONTINUED | OUTPATIENT
Start: 2021-12-27 | End: 2021-12-28

## 2021-12-27 RX ORDER — NALOXONE HYDROCHLORIDE 0.4 MG/ML
80 INJECTION, SOLUTION INTRAMUSCULAR; INTRAVENOUS; SUBCUTANEOUS AS NEEDED
Status: DISCONTINUED | OUTPATIENT
Start: 2021-12-27 | End: 2021-12-27 | Stop reason: HOSPADM

## 2021-12-27 RX ORDER — SODIUM CHLORIDE 9 MG/ML
INJECTION, SOLUTION INTRAVENOUS CONTINUOUS
Status: DISCONTINUED | OUTPATIENT
Start: 2021-12-27 | End: 2021-12-27

## 2021-12-27 RX ORDER — LIDOCAINE HYDROCHLORIDE 10 MG/ML
INJECTION, SOLUTION EPIDURAL; INFILTRATION; INTRACAUDAL; PERINEURAL AS NEEDED
Status: DISCONTINUED | OUTPATIENT
Start: 2021-12-27 | End: 2021-12-27 | Stop reason: SURG

## 2021-12-27 RX ORDER — HYDROCODONE BITARTRATE AND ACETAMINOPHEN 5; 325 MG/1; MG/1
1 TABLET ORAL EVERY 6 HOURS PRN
Qty: 20 TABLET | Refills: 0 | Status: SHIPPED | OUTPATIENT
Start: 2021-12-27

## 2021-12-27 RX ORDER — METOCLOPRAMIDE HYDROCHLORIDE 5 MG/ML
INJECTION INTRAMUSCULAR; INTRAVENOUS AS NEEDED
Status: DISCONTINUED | OUTPATIENT
Start: 2021-12-27 | End: 2021-12-27 | Stop reason: SURG

## 2021-12-27 RX ORDER — HYDROCODONE BITARTRATE AND ACETAMINOPHEN 5; 325 MG/1; MG/1
2 TABLET ORAL AS NEEDED
Status: DISCONTINUED | OUTPATIENT
Start: 2021-12-27 | End: 2021-12-27 | Stop reason: HOSPADM

## 2021-12-27 RX ORDER — HYDROMORPHONE HYDROCHLORIDE 1 MG/ML
0.2 INJECTION, SOLUTION INTRAMUSCULAR; INTRAVENOUS; SUBCUTANEOUS EVERY 2 HOUR PRN
Status: DISCONTINUED | OUTPATIENT
Start: 2021-12-27 | End: 2021-12-28

## 2021-12-27 RX ORDER — KETOROLAC TROMETHAMINE 30 MG/ML
INJECTION, SOLUTION INTRAMUSCULAR; INTRAVENOUS AS NEEDED
Status: DISCONTINUED | OUTPATIENT
Start: 2021-12-27 | End: 2021-12-27 | Stop reason: SURG

## 2021-12-27 RX ORDER — MEPERIDINE HYDROCHLORIDE 25 MG/ML
INJECTION INTRAMUSCULAR; INTRAVENOUS; SUBCUTANEOUS
Status: COMPLETED
Start: 2021-12-27 | End: 2021-12-27

## 2021-12-27 RX ORDER — HYDROMORPHONE HYDROCHLORIDE 1 MG/ML
0.8 INJECTION, SOLUTION INTRAMUSCULAR; INTRAVENOUS; SUBCUTANEOUS EVERY 2 HOUR PRN
Status: DISCONTINUED | OUTPATIENT
Start: 2021-12-27 | End: 2021-12-28

## 2021-12-27 RX ORDER — SODIUM CHLORIDE, SODIUM LACTATE, POTASSIUM CHLORIDE, CALCIUM CHLORIDE 600; 310; 30; 20 MG/100ML; MG/100ML; MG/100ML; MG/100ML
INJECTION, SOLUTION INTRAVENOUS CONTINUOUS PRN
Status: DISCONTINUED | OUTPATIENT
Start: 2021-12-27 | End: 2021-12-27 | Stop reason: SURG

## 2021-12-27 RX ORDER — ACETAMINOPHEN 325 MG/1
650 TABLET ORAL EVERY 4 HOURS PRN
Status: DISCONTINUED | OUTPATIENT
Start: 2021-12-27 | End: 2021-12-28

## 2021-12-27 RX ORDER — HYDROMORPHONE HYDROCHLORIDE 1 MG/ML
0.4 INJECTION, SOLUTION INTRAMUSCULAR; INTRAVENOUS; SUBCUTANEOUS EVERY 5 MIN PRN
Status: DISCONTINUED | OUTPATIENT
Start: 2021-12-27 | End: 2021-12-27 | Stop reason: HOSPADM

## 2021-12-27 RX ORDER — GLYCOPYRROLATE 0.2 MG/ML
INJECTION, SOLUTION INTRAMUSCULAR; INTRAVENOUS AS NEEDED
Status: DISCONTINUED | OUTPATIENT
Start: 2021-12-27 | End: 2021-12-27 | Stop reason: SURG

## 2021-12-27 RX ORDER — METOCLOPRAMIDE HYDROCHLORIDE 5 MG/ML
10 INJECTION INTRAMUSCULAR; INTRAVENOUS ONCE
Status: COMPLETED | OUTPATIENT
Start: 2021-12-27 | End: 2021-12-27

## 2021-12-27 RX ORDER — ONDANSETRON 2 MG/ML
4 INJECTION INTRAMUSCULAR; INTRAVENOUS AS NEEDED
Status: DISCONTINUED | OUTPATIENT
Start: 2021-12-27 | End: 2021-12-27 | Stop reason: HOSPADM

## 2021-12-27 RX ORDER — SODIUM CHLORIDE 9 MG/ML
INJECTION, SOLUTION INTRAVENOUS CONTINUOUS
Status: DISCONTINUED | OUTPATIENT
Start: 2021-12-27 | End: 2021-12-28

## 2021-12-27 RX ORDER — HYDROMORPHONE HYDROCHLORIDE 1 MG/ML
0.4 INJECTION, SOLUTION INTRAMUSCULAR; INTRAVENOUS; SUBCUTANEOUS EVERY 2 HOUR PRN
Status: DISCONTINUED | OUTPATIENT
Start: 2021-12-27 | End: 2021-12-28

## 2021-12-27 RX ORDER — PROCHLORPERAZINE EDISYLATE 5 MG/ML
5 INJECTION INTRAMUSCULAR; INTRAVENOUS EVERY 8 HOURS PRN
Status: DISCONTINUED | OUTPATIENT
Start: 2021-12-27 | End: 2021-12-28

## 2021-12-27 RX ORDER — ONDANSETRON 2 MG/ML
INJECTION INTRAMUSCULAR; INTRAVENOUS AS NEEDED
Status: DISCONTINUED | OUTPATIENT
Start: 2021-12-27 | End: 2021-12-27 | Stop reason: SURG

## 2021-12-27 RX ORDER — MEPERIDINE HYDROCHLORIDE 25 MG/ML
12.5 INJECTION INTRAMUSCULAR; INTRAVENOUS; SUBCUTANEOUS ONCE
Status: COMPLETED | OUTPATIENT
Start: 2021-12-27 | End: 2021-12-27

## 2021-12-27 RX ORDER — NEOSTIGMINE METHYLSULFATE 1 MG/ML
INJECTION INTRAVENOUS AS NEEDED
Status: DISCONTINUED | OUTPATIENT
Start: 2021-12-27 | End: 2021-12-27 | Stop reason: SURG

## 2021-12-27 RX ORDER — HYDROCODONE BITARTRATE AND ACETAMINOPHEN 5; 325 MG/1; MG/1
1 TABLET ORAL AS NEEDED
Status: DISCONTINUED | OUTPATIENT
Start: 2021-12-27 | End: 2021-12-27 | Stop reason: HOSPADM

## 2021-12-27 RX ORDER — POLYETHYLENE GLYCOL 3350 17 G/17G
17 POWDER, FOR SOLUTION ORAL DAILY PRN
Status: DISCONTINUED | OUTPATIENT
Start: 2021-12-27 | End: 2021-12-28

## 2021-12-27 RX ORDER — MORPHINE SULFATE 4 MG/ML
4 INJECTION, SOLUTION INTRAMUSCULAR; INTRAVENOUS ONCE
Status: DISCONTINUED | OUTPATIENT
Start: 2021-12-27 | End: 2021-12-27

## 2021-12-27 RX ORDER — HYDROMORPHONE HYDROCHLORIDE 1 MG/ML
1 INJECTION, SOLUTION INTRAMUSCULAR; INTRAVENOUS; SUBCUTANEOUS EVERY 30 MIN PRN
Status: COMPLETED | OUTPATIENT
Start: 2021-12-27 | End: 2021-12-27

## 2021-12-27 RX ORDER — SODIUM PHOSPHATE, DIBASIC AND SODIUM PHOSPHATE, MONOBASIC 7; 19 G/133ML; G/133ML
1 ENEMA RECTAL ONCE AS NEEDED
Status: DISCONTINUED | OUTPATIENT
Start: 2021-12-27 | End: 2021-12-28

## 2021-12-27 RX ORDER — MELATONIN
3 NIGHTLY PRN
Status: DISCONTINUED | OUTPATIENT
Start: 2021-12-27 | End: 2021-12-28

## 2021-12-27 RX ORDER — HYDROCODONE BITARTRATE AND ACETAMINOPHEN 5; 325 MG/1; MG/1
1 TABLET ORAL EVERY 4 HOURS PRN
Status: DISCONTINUED | OUTPATIENT
Start: 2021-12-27 | End: 2021-12-28

## 2021-12-27 RX ORDER — HYDROCODONE BITARTRATE AND ACETAMINOPHEN 5; 325 MG/1; MG/1
2 TABLET ORAL EVERY 4 HOURS PRN
Status: DISCONTINUED | OUTPATIENT
Start: 2021-12-27 | End: 2021-12-28

## 2021-12-27 RX ORDER — DIPHENHYDRAMINE HYDROCHLORIDE 50 MG/ML
25 INJECTION INTRAMUSCULAR; INTRAVENOUS ONCE
Status: COMPLETED | OUTPATIENT
Start: 2021-12-27 | End: 2021-12-27

## 2021-12-27 RX ADMIN — ROCURONIUM BROMIDE 50 MG: 10 INJECTION, SOLUTION INTRAVENOUS at 16:40:00

## 2021-12-27 RX ADMIN — DEXAMETHASONE SODIUM PHOSPHATE 8 MG: 4 MG/ML VIAL (ML) INJECTION at 16:44:00

## 2021-12-27 RX ADMIN — KETOROLAC TROMETHAMINE 30 MG: 30 INJECTION, SOLUTION INTRAMUSCULAR; INTRAVENOUS at 17:25:00

## 2021-12-27 RX ADMIN — ONDANSETRON 4 MG: 2 INJECTION INTRAMUSCULAR; INTRAVENOUS at 16:44:00

## 2021-12-27 RX ADMIN — NEOSTIGMINE METHYLSULFATE 4 MG: 1 INJECTION INTRAVENOUS at 17:28:00

## 2021-12-27 RX ADMIN — SODIUM CHLORIDE: 9 INJECTION, SOLUTION INTRAVENOUS at 16:33:00

## 2021-12-27 RX ADMIN — LIDOCAINE HYDROCHLORIDE 100 MG: 10 INJECTION, SOLUTION EPIDURAL; INFILTRATION; INTRACAUDAL; PERINEURAL at 16:40:00

## 2021-12-27 RX ADMIN — METOCLOPRAMIDE HYDROCHLORIDE 10 MG: 5 INJECTION INTRAMUSCULAR; INTRAVENOUS at 16:44:00

## 2021-12-27 RX ADMIN — SODIUM CHLORIDE: 9 INJECTION, SOLUTION INTRAVENOUS at 17:12:00

## 2021-12-27 RX ADMIN — SODIUM CHLORIDE, SODIUM LACTATE, POTASSIUM CHLORIDE, CALCIUM CHLORIDE: 600; 310; 30; 20 INJECTION, SOLUTION INTRAVENOUS at 17:25:00

## 2021-12-27 RX ADMIN — CEFOXITIN 2 G: 2 INJECTION, POWDER, FOR SOLUTION INTRAVENOUS at 16:43:00

## 2021-12-27 RX ADMIN — GLYCOPYRROLATE 0.8 MG: 0.2 INJECTION, SOLUTION INTRAMUSCULAR; INTRAVENOUS at 17:28:00

## 2021-12-27 NOTE — OPERATIVE REPORT
[facility]  Op Note    [place] Location: OR   [facility] [id number] MRN [id number]   Admission Date 12/27/2021 Operation Date 12/27/2021   Attending Physician Mona Manning MD Operating Physician Flower Glaser MD   DATE OF OPERATION: needle was passed into the abdomen, and pneumoperitoneum was instituted without difficulty. The 11 mm trocar was passed through this incision site.  The abdomen was inspected and found to be grossly normal. Under direct vision, a 5 mm subxiphoid port and tw epinephrine was injected around the incisions to help with postoperative pain control. Steri-Strips and Mastisol were placed across the incisions. The patient tolerated the procedure well, was extubated in the OR, and went to the PACU in good condition.

## 2021-12-27 NOTE — ANESTHESIA POSTPROCEDURE EVALUATION
[address] Patient Status:  Inpatient   Age/Gender 36year old female MRN [id number]   [facility] SURGERY Attending Cornelio Dorado MD   Hosp Day # 0 PCP No primary care provider on file.        Anesthesia Pos

## 2021-12-27 NOTE — CONSULTS
[facility]  CONSULT     Vance Mckinnon Patient Status:  Inpatient    1990 MRN [id number]   [facility] 3NW-A Attending Morales Bianchi MD   Hosp Day # 0 PCP No primary care provider on file.      Reason for consult: medications on file prior to encounter. Dimethyl Fumarate 240 MG Oral Capsule Delayed Release, Take 240 mg by mouth 2 (two) times daily.  (Patient not taking: Reported on 12/27/2021), Disp: 60 capsule, Rfl: 3  Amitriptyline HCl 25 MG Oral Tab, Take 1 table TROP, CK in the last 168 hours. Imaging: Imaging data reviewed in Epic. ASSESSMENT / PLAN:     1. Acute cholecystitis  1. General surgery primary  2. NPO  3. IVF  4.  Given a dose of antibiotics in ER, would place on antibiotics with anaerobic and gra

## 2021-12-27 NOTE — PLAN OF CARE
NURSING ADMISSION NOTE      Patient admitted via Cart from  ED. Oriented to room. Safety precautions initiated. Bed in low position. Call light in reach.

## 2021-12-27 NOTE — ANESTHESIA PROCEDURE NOTES
Airway  Date/Time: 12/27/2021 4:42 PM  Urgency: elective      General Information and Staff    Patient location during procedure: OR  Anesthesiologist: Lidia Israel MD  Performed: anesthesiologist     Indications and Patient Condition  Indications for a

## 2021-12-27 NOTE — ED PROVIDER NOTES
Patient Seen in: THE [facility] [place] Emergency Department In [place]      History   Patient presents with:  Abdomen/Flank Pain    Stated Complaint: RUQ abdom pain today    Subjective:   HPI    72-year-old female presents to the emergency department complaining of thyromegaly. Lungs are clear to auscultation. Heart exam: Normal S1-S2 without extra sounds or murmurs. Regular rate and rhythm. Abdomen: NABS.   Obese but soft with moderate epigastric and right upper quadrant tenderness and guarding without masses or PATIENT STATED HISTORY: (As transcribed by Technologist)  Patient has RUQ pain with N/V. FINDINGS:  LIVER:  Normal size and echogenicity. No significant masses. BILIARY:  The patient was not NPO. Numerous gallstones along with gallbladder dilation.   Rehan Tafoya Disposition:  Admit  12/27/2021 12:44 pm    Follow-up:  No follow-up provider specified.         Medications Prescribed:  Current Discharge Medication List                          Hospital Problems             Present on Admission  Date Reviewed: 1/22/

## 2021-12-27 NOTE — ED QUICK NOTES
Orders for admission, patient is aware of plan and ready to go upstairs. Any questions, please call ED RN Apollo Andrade  at extension [phone]. Vaccinated? yes  Type of COVID test sent:rapid neg  COVID Suspicion level: Low      Titratable drug(s) infusing:  Ra

## 2021-12-27 NOTE — ANESTHESIA PREPROCEDURE EVALUATION
PRE-OP EVALUATION    Patient Name: Lev Saenz    Admit Diagnosis: Hypokalemia [E87.6]  Calculus of gallbladder with acute cholecystitis without obstruction [K80.00]    Pre-op Diagnosis: Cholelithiasis [K80.20]    LAPAROSCOPIC CHOLECYSTECTOMY WIT PRN  [MAR Hold] polyethylene glycol (PEG 3350) (MIRALAX) powder packet 17 g, 17 g, Oral, Daily PRN  [MAR Hold] sennosides (SENOKOT) tab 17.2 mg, 17.2 mg, Oral, Nightly PRN  [MAR Hold] bisacodyl (DULCOLAX) rectal suppository 10 mg, 10 mg, Rectal, Daily PRN MCV 92.8 12/27/2021    MCH 30.0 12/27/2021    MCHC 32.3 12/27/2021    RDW 12.8 12/27/2021    .0 12/27/2021     Lab Results   Component Value Date     12/27/2021    K 3.5 12/27/2021     12/27/2021    CO2 25.0 12/27/2021    BUN 6 (L) 12/27

## 2021-12-27 NOTE — H&P
[address] [facility] Patient Status:  Inpatient    1990 MRN [id number]   Location [facility] Attending Ab Sawyer MD   Hosp Day # 0 PCP No primary care provider on file.      Reason sig reported), Disp: , Rfl: , Unknown at Unknown time        Review of Systems:    Allergic/Immuno:  Patient has no known allergies.   Cardiovascular:  Negative for cool extremity and irregular heartbeat/palpitations  Constitutional:  Negative for decreased noted.  Without clubbing or cyanosis. Skin: Normal texture and turgor. Lymphatic:  No palpable cervical lymphadenopathy. Neurologic: Cranial nerves are grossly intact.   Motor strength and sensory examination is grossly normal.  No focal neurologic def back pain without sciatica     Thyroid nodule     Multiple sclerosis (HCC)     Calculus of gallbladder with acute cholecystitis without obstruction     Hypokalemia      Plan:  The patient was offered laparoscopic cholecystectomy with intraoperative cholang

## 2021-12-28 VITALS
HEIGHT: 63 IN | WEIGHT: 221 LBS | DIASTOLIC BLOOD PRESSURE: 44 MMHG | RESPIRATION RATE: 18 BRPM | TEMPERATURE: 99 F | HEART RATE: 61 BPM | OXYGEN SATURATION: 98 % | BODY MASS INDEX: 39.16 KG/M2 | SYSTOLIC BLOOD PRESSURE: 108 MMHG

## 2021-12-28 PROCEDURE — 99232 SBSQ HOSP IP/OBS MODERATE 35: CPT | Performed by: INTERNAL MEDICINE

## 2021-12-28 NOTE — PLAN OF CARE
Upon assessment, pt is resting in bed. A&O x4, VSS, tolerating RA. O2 sats 97-99%. Pt denies chest pain, sob, n/v, generalized pain. Pt is new admit; oriented to room and unit. Pt is DTV; will continue to monitor. IVFs infusing, IV site clean/dry/intact.  P

## 2021-12-28 NOTE — PLAN OF CARE
NURSING ADMISSION NOTE    Pt arrived from PACU in stable condition       Patient admitted via Cart  Oriented to room. Safety precautions initiated. Bed in low position. Call light in reach.

## 2021-12-28 NOTE — PROGRESS NOTES
[facility]  Progress Note     Keli Mast Patient Status:  Inpatient    1990 MRN [id number]   [facility] 3NW-A Attending Kylee Duffy MD   Hosp Day # 1 PCP No primary care provider on file.      Chief Complaint: famoTIDine  20 mg Intravenous Daily     ASSESSMENT / PLAN:     1. Acute cholecystitis s/p laparoscopic cholecystectomy on 12/27/21  1. General surgery primary  2. Pain control  2. Leukocytosis due to above  3. MS  1.  Patient sees neurologist in [place]

## 2021-12-28 NOTE — PLAN OF CARE
Problem: Patient/Family Goals  Goal: Patient/Family Long Term Goal  Description: Patient's Long Term Goal: Discharge Home    Interventions:  - Pain tolerance  -Diet Tolerance  - See additional Care Plan goals for specific interventions  Outcome: Progress

## 2021-12-28 NOTE — PROGRESS NOTES
[facility]  Progress Note    Angeles Putnam Patient Status:  Inpatient    1990 MRN [id number]   [facility] 3NW-A Attending Alexa Zavala MD   Hosp Day # 1 PCP No primary care provider on file. Subjective:   The pat PHURINE 7.5 12/27/2021    PROUR Negative 12/27/2021    UROBILINOGEN 0.2 12/27/2021    NITRITE Negative 12/27/2021    LEUUR Negative 12/27/2021       Assessment:  POD #1 laparoscopic cholecystectomy    Plan:  1.  Patient overall is doing well since her surge

## 2022-02-16 ENCOUNTER — PATIENT MESSAGE (OUTPATIENT)
Dept: NEUROLOGY | Facility: CLINIC | Age: 32
End: 2022-02-16

## 2022-02-17 NOTE — TELEPHONE ENCOUNTER
From: Errol Medrano  To: Yashira Houston DO  Sent: 2/16/2022 6:30 PM CST  Subject: Hello     I wanted to know how do I got about to getting my medical records sent to my dr. Willem Miles you fax her my info [phone] and mail a CD to her?

## 2022-03-06 ENCOUNTER — PATIENT MESSAGE (OUTPATIENT)
Dept: NEUROLOGY | Facility: CLINIC | Age: 32
End: 2022-03-06

## 2022-03-07 ENCOUNTER — PATIENT MESSAGE (OUTPATIENT)
Dept: NEUROLOGY | Facility: CLINIC | Age: 32
End: 2022-03-07

## 2022-03-07 NOTE — TELEPHONE ENCOUNTER
From: Keaton Small  Sent: 3/6/2022 11:10 AM CST  To: Callie Nelson Nurse  Subject: Hello     Would you be able to provide me the medical office phone number? Also the radiologist office?

## 2022-06-06 ENCOUNTER — OFFICE VISIT (OUTPATIENT)
Dept: FAMILY MEDICINE CLINIC | Facility: CLINIC | Age: 32
End: 2022-06-06
Payer: MEDICAID

## 2022-06-06 ENCOUNTER — PATIENT MESSAGE (OUTPATIENT)
Dept: FAMILY MEDICINE CLINIC | Facility: CLINIC | Age: 32
End: 2022-06-06

## 2022-06-06 ENCOUNTER — LAB ENCOUNTER (OUTPATIENT)
Dept: LAB | Age: 32
End: 2022-06-06
Attending: FAMILY MEDICINE
Payer: MEDICAID

## 2022-06-06 VITALS
SYSTOLIC BLOOD PRESSURE: 102 MMHG | OXYGEN SATURATION: 99 % | HEIGHT: 63.75 IN | BODY MASS INDEX: 36.99 KG/M2 | WEIGHT: 214 LBS | HEART RATE: 70 BPM | DIASTOLIC BLOOD PRESSURE: 74 MMHG | RESPIRATION RATE: 16 BRPM

## 2022-06-06 DIAGNOSIS — G35 MULTIPLE SCLEROSIS (HCC): ICD-10-CM

## 2022-06-06 DIAGNOSIS — J02.9 SORE THROAT: ICD-10-CM

## 2022-06-06 DIAGNOSIS — Z12.4 ENCOUNTER FOR SCREENING FOR CERVICAL CANCER: ICD-10-CM

## 2022-06-06 DIAGNOSIS — Z00.00 WELLNESS EXAMINATION: Primary | ICD-10-CM

## 2022-06-06 DIAGNOSIS — E04.1 THYROID NODULE: ICD-10-CM

## 2022-06-06 DIAGNOSIS — Z00.00 LABORATORY EXAMINATION ORDERED AS PART OF A ROUTINE GENERAL MEDICAL EXAMINATION: ICD-10-CM

## 2022-06-06 LAB — SARS-COV-2 RNA RESP QL NAA+PROBE: NOT DETECTED

## 2022-06-06 PROCEDURE — 3074F SYST BP LT 130 MM HG: CPT | Performed by: FAMILY MEDICINE

## 2022-06-06 PROCEDURE — 3008F BODY MASS INDEX DOCD: CPT | Performed by: FAMILY MEDICINE

## 2022-06-06 PROCEDURE — 3078F DIAST BP <80 MM HG: CPT | Performed by: FAMILY MEDICINE

## 2022-06-06 PROCEDURE — 88175 CYTOPATH C/V AUTO FLUID REDO: CPT | Performed by: FAMILY MEDICINE

## 2022-06-06 PROCEDURE — 99395 PREV VISIT EST AGE 18-39: CPT | Performed by: FAMILY MEDICINE

## 2022-06-06 PROCEDURE — 87624 HPV HI-RISK TYP POOLED RSLT: CPT | Performed by: FAMILY MEDICINE

## 2022-06-06 RX ORDER — DIMETHYL FUMARATE 120-240 MG
1 KIT ORAL 2 TIMES DAILY
COMMUNITY

## 2022-06-07 ENCOUNTER — PATIENT MESSAGE (OUTPATIENT)
Dept: FAMILY MEDICINE CLINIC | Facility: CLINIC | Age: 32
End: 2022-06-07

## 2022-06-07 LAB — HPV I/H RISK 1 DNA SPEC QL NAA+PROBE: NEGATIVE

## 2022-06-08 NOTE — TELEPHONE ENCOUNTER
Message from Dr. Florence Dos Santos:   Refer to [facility] for testing         Documentation

## 2022-06-17 ENCOUNTER — PATIENT MESSAGE (OUTPATIENT)
Dept: FAMILY MEDICINE CLINIC | Facility: CLINIC | Age: 32
End: 2022-06-17

## 2022-06-20 ENCOUNTER — LAB ENCOUNTER (OUTPATIENT)
Dept: LAB | Age: 32
End: 2022-06-20
Attending: FAMILY MEDICINE
Payer: MEDICAID

## 2022-06-20 DIAGNOSIS — Z00.00 LABORATORY EXAMINATION ORDERED AS PART OF A ROUTINE GENERAL MEDICAL EXAMINATION: Primary | ICD-10-CM

## 2022-06-20 LAB
ALBUMIN SERPL-MCNC: 3.6 G/DL (ref 3.4–5)
ALBUMIN/GLOB SERPL: 0.9 {RATIO} (ref 1–2)
ALP LIVER SERPL-CCNC: 73 U/L
ALT SERPL-CCNC: 17 U/L
ANION GAP SERPL CALC-SCNC: 7 MMOL/L (ref 0–18)
AST SERPL-CCNC: 13 U/L (ref 15–37)
BASOPHILS # BLD AUTO: 0.05 X10(3) UL (ref 0–0.2)
BASOPHILS NFR BLD AUTO: 0.6 %
BILIRUB SERPL-MCNC: 0.7 MG/DL (ref 0.1–2)
BUN BLD-MCNC: 6 MG/DL (ref 7–18)
CALCIUM BLD-MCNC: 9.3 MG/DL (ref 8.5–10.1)
CHLORIDE SERPL-SCNC: 110 MMOL/L (ref 98–112)
CHOLEST SERPL-MCNC: 127 MG/DL (ref ?–200)
CO2 SERPL-SCNC: 23 MMOL/L (ref 21–32)
CREAT BLD-MCNC: 0.84 MG/DL
EOSINOPHIL # BLD AUTO: 0.12 X10(3) UL (ref 0–0.7)
EOSINOPHIL NFR BLD AUTO: 1.5 %
ERYTHROCYTE [DISTWIDTH] IN BLOOD BY AUTOMATED COUNT: 12.6 %
FASTING PATIENT LIPID ANSWER: YES
FASTING STATUS PATIENT QL REPORTED: YES
GLOBULIN PLAS-MCNC: 4.1 G/DL (ref 2.8–4.4)
GLUCOSE BLD-MCNC: 84 MG/DL (ref 70–99)
HCT VFR BLD AUTO: 40.3 %
HDLC SERPL-MCNC: 44 MG/DL (ref 40–59)
HGB BLD-MCNC: 13 G/DL
IMM GRANULOCYTES # BLD AUTO: 0.02 X10(3) UL (ref 0–1)
IMM GRANULOCYTES NFR BLD: 0.2 %
LDLC SERPL CALC-MCNC: 73 MG/DL (ref ?–100)
LYMPHOCYTES # BLD AUTO: 1.85 X10(3) UL (ref 1–4)
LYMPHOCYTES NFR BLD AUTO: 22.8 %
MCH RBC QN AUTO: 30 PG (ref 26–34)
MCHC RBC AUTO-ENTMCNC: 32.3 G/DL (ref 31–37)
MCV RBC AUTO: 92.9 FL
MONOCYTES # BLD AUTO: 0.64 X10(3) UL (ref 0.1–1)
MONOCYTES NFR BLD AUTO: 7.9 %
NEUTROPHILS # BLD AUTO: 5.43 X10 (3) UL (ref 1.5–7.7)
NEUTROPHILS # BLD AUTO: 5.43 X10(3) UL (ref 1.5–7.7)
NEUTROPHILS NFR BLD AUTO: 67 %
NONHDLC SERPL-MCNC: 83 MG/DL (ref ?–130)
OSMOLALITY SERPL CALC.SUM OF ELEC: 287 MOSM/KG (ref 275–295)
PLATELET # BLD AUTO: 253 10(3)UL (ref 150–450)
POTASSIUM SERPL-SCNC: 3.7 MMOL/L (ref 3.5–5.1)
PROT SERPL-MCNC: 7.7 G/DL (ref 6.4–8.2)
RBC # BLD AUTO: 4.34 X10(6)UL
SODIUM SERPL-SCNC: 140 MMOL/L (ref 136–145)
TRIGL SERPL-MCNC: 39 MG/DL (ref 30–149)
TSI SER-ACNC: 0.78 MIU/ML (ref 0.36–3.74)
VLDLC SERPL CALC-MCNC: 6 MG/DL (ref 0–30)
WBC # BLD AUTO: 8.1 X10(3) UL (ref 4–11)

## 2022-06-20 PROCEDURE — 80061 LIPID PANEL: CPT

## 2022-06-21 ENCOUNTER — PATIENT MESSAGE (OUTPATIENT)
Dept: FAMILY MEDICINE CLINIC | Facility: CLINIC | Age: 32
End: 2022-06-21

## 2022-06-21 ENCOUNTER — HOSPITAL ENCOUNTER (OUTPATIENT)
Dept: ULTRASOUND IMAGING | Age: 32
Discharge: HOME | End: 2022-06-21
Attending: FAMILY MEDICINE
Payer: MEDICAID

## 2022-06-21 DIAGNOSIS — E04.1 THYROID NODULE: ICD-10-CM

## 2022-06-21 PROCEDURE — 76536 US EXAM OF HEAD AND NECK: CPT | Performed by: FAMILY MEDICINE

## 2022-06-22 ENCOUNTER — PATIENT MESSAGE (OUTPATIENT)
Dept: FAMILY MEDICINE CLINIC | Facility: CLINIC | Age: 32
End: 2022-06-22

## 2022-06-22 ENCOUNTER — TELEPHONE (OUTPATIENT)
Dept: FAMILY MEDICINE CLINIC | Facility: CLINIC | Age: 32
End: 2022-06-22

## 2022-06-22 NOTE — TELEPHONE ENCOUNTER
We did test for diabetes. Your fasting blood sugar was within normal limits on the complete metabolic panel.

## 2022-06-22 NOTE — TELEPHONE ENCOUNTER
----- Message from Marcy Pino MD sent at 6/22/2022 10:53 AM CDT -----  Thyroid nodule has grown larger in size. She has had a FNA in the past which showed benign nodule. We can repeat the FNA or if she wants to discuss this with ENT that is okay too.

## 2022-06-22 NOTE — TELEPHONE ENCOUNTER
Pt sent message requesting to be tested for diabetes as it runs in her family. Please advise. Thank you.    LOV: 06/06/22

## 2022-06-22 NOTE — TELEPHONE ENCOUNTER
From: Tegan Flores  To: Lazara Noel MD  Sent: 6/21/2022 6:52 PM CDT  Subject: Hello    I wanted to know if I could get a test for diabetes cause it runs in my family and I would like to keep track on it. I forgot to mention it when I did my visit.

## 2022-07-05 ENCOUNTER — PATIENT MESSAGE (OUTPATIENT)
Dept: FAMILY MEDICINE CLINIC | Facility: CLINIC | Age: 32
End: 2022-07-05

## 2022-07-05 DIAGNOSIS — E04.1 THYROID NODULE: Primary | ICD-10-CM

## 2022-07-05 NOTE — TELEPHONE ENCOUNTER
Spoke with Surya at Dr. Amalia Bhtaia office. Dr. Amalia Bhatia office does not have referral. Referral placed as internal. Will need to fax referral to [phone]. Referral changed to external. Faxed to number listed above. Received confirmation fax.

## 2022-07-21 ENCOUNTER — PATIENT MESSAGE (OUTPATIENT)
Dept: FAMILY MEDICINE CLINIC | Facility: CLINIC | Age: 32
End: 2022-07-21

## 2022-07-21 DIAGNOSIS — G35 MULTIPLE SCLEROSIS (HCC): Primary | ICD-10-CM

## 2022-07-25 ENCOUNTER — OFFICE VISIT (OUTPATIENT)
Dept: FAMILY MEDICINE CLINIC | Facility: CLINIC | Age: 32
End: 2022-07-25
Payer: MEDICAID

## 2022-07-25 VITALS
RESPIRATION RATE: 16 BRPM | BODY MASS INDEX: 36.47 KG/M2 | DIASTOLIC BLOOD PRESSURE: 62 MMHG | SYSTOLIC BLOOD PRESSURE: 112 MMHG | WEIGHT: 211 LBS | HEIGHT: 63.75 IN | HEART RATE: 64 BPM | OXYGEN SATURATION: 98 %

## 2022-07-25 DIAGNOSIS — E04.1 THYROID NODULE: ICD-10-CM

## 2022-07-25 DIAGNOSIS — K08.89 TOOTH ACHE: Primary | ICD-10-CM

## 2022-07-25 DIAGNOSIS — Z30.42 ENCOUNTER FOR SURVEILLANCE OF INJECTABLE CONTRACEPTIVE: ICD-10-CM

## 2022-07-25 RX ORDER — IBUPROFEN 800 MG/1
800 TABLET ORAL EVERY 8 HOURS PRN
Qty: 90 TABLET | Refills: 0 | Status: SHIPPED | OUTPATIENT
Start: 2022-07-25

## 2022-07-25 RX ORDER — MEDROXYPROGESTERONE ACETATE 150 MG/ML
150 INJECTION, SUSPENSION INTRAMUSCULAR ONCE
Status: COMPLETED | OUTPATIENT
Start: 2022-07-25 | End: 2022-07-25

## 2022-07-25 RX ADMIN — MEDROXYPROGESTERONE ACETATE 150 MG: 150 INJECTION, SUSPENSION INTRAMUSCULAR at 11:50:00

## 2022-08-05 ENCOUNTER — PATIENT MESSAGE (OUTPATIENT)
Dept: FAMILY MEDICINE CLINIC | Facility: CLINIC | Age: 32
End: 2022-08-05

## 2022-08-06 NOTE — TELEPHONE ENCOUNTER
From: Margo Gunderson  To: Gael Brooks MD  Sent: 8/5/2022 11:40 PM CDT  Subject: Irvin Welsh faxed over something that I need as approval for my throat surgery. I wanted to know if you guys received it.

## 2022-08-06 NOTE — TELEPHONE ENCOUNTER
Doc or Carlos Luque    Did you receive a form for pt for her upcoming surgery from ENT-Dr Rizzo?     You saw her though on 7/25/22 for toothache and other issues

## 2022-08-08 ENCOUNTER — TELEPHONE (OUTPATIENT)
Dept: FAMILY MEDICINE CLINIC | Facility: CLINIC | Age: 32
End: 2022-08-08

## 2022-08-08 NOTE — TELEPHONE ENCOUNTER
32yo F with scheduled tonsillectomy and left thyroid lobectomy on 08/16/22 with Dr. Clary Canchola at [facility]. She has no history of cardiac, pulmonary conditions. Her MS is controlled on dimethyl fumarate. Her RCRI score is 0, class 1 risk. She is a good surgical candidate.

## 2022-08-08 NOTE — TELEPHONE ENCOUNTER
Pre-Op clearance note sent via fax # [phone] to Dr. Jourdan Alves and copy also sent to fax # [phone] to [facility]. Success confirmation was received for both places. Called Pt to notify.

## 2022-08-13 ENCOUNTER — LAB ENCOUNTER (OUTPATIENT)
Dept: LAB | Facility: HOSPITAL | Age: 32
End: 2022-08-13
Attending: OTOLARYNGOLOGY
Payer: MEDICAID

## 2022-08-13 DIAGNOSIS — Z20.822 ENCOUNTER FOR PREOPERATIVE SCREENING LABORATORY TESTING FOR COVID-19 VIRUS: ICD-10-CM

## 2022-08-13 DIAGNOSIS — Z01.812 ENCOUNTER FOR PREOPERATIVE SCREENING LABORATORY TESTING FOR COVID-19 VIRUS: ICD-10-CM

## 2022-08-13 LAB — SARS-COV-2 RNA RESP QL NAA+PROBE: NOT DETECTED

## 2022-08-15 ENCOUNTER — ANESTHESIA EVENT (OUTPATIENT)
Dept: SURGERY | Facility: HOSPITAL | Age: 32
End: 2022-08-15
Payer: MEDICAID

## 2022-08-16 ENCOUNTER — HOSPITAL ENCOUNTER (OUTPATIENT)
Facility: HOSPITAL | Age: 32
Setting detail: HOSPITAL OUTPATIENT SURGERY
Discharge: HOME | End: 2022-08-16
Attending: OTOLARYNGOLOGY | Admitting: OTOLARYNGOLOGY
Payer: MEDICAID

## 2022-08-16 ENCOUNTER — ANESTHESIA (OUTPATIENT)
Dept: SURGERY | Facility: HOSPITAL | Age: 32
End: 2022-08-16
Payer: MEDICAID

## 2022-08-16 VITALS
RESPIRATION RATE: 16 BRPM | HEIGHT: 63.25 IN | DIASTOLIC BLOOD PRESSURE: 81 MMHG | HEART RATE: 95 BPM | OXYGEN SATURATION: 99 % | SYSTOLIC BLOOD PRESSURE: 128 MMHG | WEIGHT: 209.44 LBS | TEMPERATURE: 97 F | BODY MASS INDEX: 36.65 KG/M2

## 2022-08-16 DIAGNOSIS — Z20.822 ENCOUNTER FOR PREOPERATIVE SCREENING LABORATORY TESTING FOR COVID-19 VIRUS: Primary | ICD-10-CM

## 2022-08-16 DIAGNOSIS — Z01.812 ENCOUNTER FOR PREOPERATIVE SCREENING LABORATORY TESTING FOR COVID-19 VIRUS: Primary | ICD-10-CM

## 2022-08-16 LAB — B-HCG UR QL: NEGATIVE

## 2022-08-16 PROCEDURE — 96374 THER/PROPH/DIAG INJ IV PUSH: CPT

## 2022-08-16 PROCEDURE — 88307 TISSUE EXAM BY PATHOLOGIST: CPT | Performed by: OTOLARYNGOLOGY

## 2022-08-16 PROCEDURE — 99285 EMERGENCY DEPT VISIT HI MDM: CPT

## 2022-08-16 PROCEDURE — 96361 HYDRATE IV INFUSION ADD-ON: CPT

## 2022-08-16 PROCEDURE — 81025 URINE PREGNANCY TEST: CPT

## 2022-08-16 PROCEDURE — 96375 TX/PRO/DX INJ NEW DRUG ADDON: CPT

## 2022-08-16 PROCEDURE — 0GTG0ZZ RESECTION OF LEFT THYROID GLAND LOBE, OPEN APPROACH: ICD-10-PCS | Performed by: OTOLARYNGOLOGY

## 2022-08-16 PROCEDURE — 0CTPXZZ RESECTION OF TONSILS, EXTERNAL APPROACH: ICD-10-PCS | Performed by: OTOLARYNGOLOGY

## 2022-08-16 PROCEDURE — 88304 TISSUE EXAM BY PATHOLOGIST: CPT | Performed by: OTOLARYNGOLOGY

## 2022-08-16 RX ORDER — CEFAZOLIN SODIUM/WATER 2 G/20 ML
SYRINGE (ML) INTRAVENOUS
Status: DISCONTINUED
Start: 2022-08-16 | End: 2022-08-16 | Stop reason: WASHOUT

## 2022-08-16 RX ORDER — HYDROCODONE BITARTRATE AND ACETAMINOPHEN 5; 325 MG/1; MG/1
1 TABLET ORAL ONCE AS NEEDED
Status: DISCONTINUED | OUTPATIENT
Start: 2022-08-16 | End: 2022-08-16

## 2022-08-16 RX ORDER — SODIUM CHLORIDE, SODIUM LACTATE, POTASSIUM CHLORIDE, CALCIUM CHLORIDE 600; 310; 30; 20 MG/100ML; MG/100ML; MG/100ML; MG/100ML
INJECTION, SOLUTION INTRAVENOUS CONTINUOUS
Status: DISCONTINUED | OUTPATIENT
Start: 2022-08-16 | End: 2022-08-16

## 2022-08-16 RX ORDER — ACETAMINOPHEN 10 MG/ML
INJECTION, SOLUTION INTRAVENOUS
Status: COMPLETED
Start: 2022-08-16 | End: 2022-08-16

## 2022-08-16 RX ORDER — MIDAZOLAM HYDROCHLORIDE 1 MG/ML
INJECTION INTRAMUSCULAR; INTRAVENOUS AS NEEDED
Status: DISCONTINUED | OUTPATIENT
Start: 2022-08-16 | End: 2022-08-16 | Stop reason: SURG

## 2022-08-16 RX ORDER — ONDANSETRON 2 MG/ML
INJECTION INTRAMUSCULAR; INTRAVENOUS AS NEEDED
Status: DISCONTINUED | OUTPATIENT
Start: 2022-08-16 | End: 2022-08-16 | Stop reason: SURG

## 2022-08-16 RX ORDER — CEFAZOLIN SODIUM/WATER 2 G/20 ML
2 SYRINGE (ML) INTRAVENOUS ONCE
Status: DISCONTINUED | OUTPATIENT
Start: 2022-08-16 | End: 2022-08-16 | Stop reason: HOSPADM

## 2022-08-16 RX ORDER — ROCURONIUM BROMIDE 10 MG/ML
INJECTION, SOLUTION INTRAVENOUS AS NEEDED
Status: DISCONTINUED | OUTPATIENT
Start: 2022-08-16 | End: 2022-08-16 | Stop reason: SURG

## 2022-08-16 RX ORDER — HYDROMORPHONE HYDROCHLORIDE 1 MG/ML
0.4 INJECTION, SOLUTION INTRAMUSCULAR; INTRAVENOUS; SUBCUTANEOUS EVERY 5 MIN PRN
Status: DISCONTINUED | OUTPATIENT
Start: 2022-08-16 | End: 2022-08-16

## 2022-08-16 RX ORDER — TRAMADOL HYDROCHLORIDE 50 MG/1
50 TABLET ORAL ONCE
Status: DISCONTINUED | OUTPATIENT
Start: 2022-08-16 | End: 2022-08-16

## 2022-08-16 RX ORDER — ACETAMINOPHEN 500 MG
1000 TABLET ORAL ONCE AS NEEDED
Status: DISCONTINUED | OUTPATIENT
Start: 2022-08-16 | End: 2022-08-16

## 2022-08-16 RX ORDER — NALOXONE HYDROCHLORIDE 0.4 MG/ML
80 INJECTION, SOLUTION INTRAMUSCULAR; INTRAVENOUS; SUBCUTANEOUS AS NEEDED
Status: DISCONTINUED | OUTPATIENT
Start: 2022-08-16 | End: 2022-08-16

## 2022-08-16 RX ORDER — MIDAZOLAM HYDROCHLORIDE 1 MG/ML
1 INJECTION INTRAMUSCULAR; INTRAVENOUS EVERY 5 MIN PRN
Status: DISCONTINUED | OUTPATIENT
Start: 2022-08-16 | End: 2022-08-16

## 2022-08-16 RX ORDER — ACETAMINOPHEN 10 MG/ML
1000 INJECTION, SOLUTION INTRAVENOUS ONCE AS NEEDED
Status: COMPLETED | OUTPATIENT
Start: 2022-08-16 | End: 2022-08-16

## 2022-08-16 RX ORDER — LIDOCAINE HYDROCHLORIDE AND EPINEPHRINE 10; 10 MG/ML; UG/ML
INJECTION, SOLUTION INFILTRATION; PERINEURAL AS NEEDED
Status: DISCONTINUED | OUTPATIENT
Start: 2022-08-16 | End: 2022-08-16 | Stop reason: HOSPADM

## 2022-08-16 RX ORDER — HYDROCODONE BITARTRATE AND ACETAMINOPHEN 5; 325 MG/1; MG/1
2 TABLET ORAL ONCE AS NEEDED
Status: DISCONTINUED | OUTPATIENT
Start: 2022-08-16 | End: 2022-08-16

## 2022-08-16 RX ORDER — LIDOCAINE HYDROCHLORIDE 10 MG/ML
INJECTION, SOLUTION EPIDURAL; INFILTRATION; INTRACAUDAL; PERINEURAL AS NEEDED
Status: DISCONTINUED | OUTPATIENT
Start: 2022-08-16 | End: 2022-08-16 | Stop reason: SURG

## 2022-08-16 RX ORDER — HYDROMORPHONE HYDROCHLORIDE 1 MG/ML
INJECTION, SOLUTION INTRAMUSCULAR; INTRAVENOUS; SUBCUTANEOUS
Status: COMPLETED
Start: 2022-08-16 | End: 2022-08-16

## 2022-08-16 RX ORDER — MEPERIDINE HYDROCHLORIDE 25 MG/ML
12.5 INJECTION INTRAMUSCULAR; INTRAVENOUS; SUBCUTANEOUS AS NEEDED
Status: DISCONTINUED | OUTPATIENT
Start: 2022-08-16 | End: 2022-08-16

## 2022-08-16 RX ORDER — ONDANSETRON 2 MG/ML
4 INJECTION INTRAMUSCULAR; INTRAVENOUS EVERY 6 HOURS PRN
Status: DISCONTINUED | OUTPATIENT
Start: 2022-08-16 | End: 2022-08-16

## 2022-08-16 RX ORDER — DEXAMETHASONE SODIUM PHOSPHATE 4 MG/ML
VIAL (ML) INJECTION AS NEEDED
Status: DISCONTINUED | OUTPATIENT
Start: 2022-08-16 | End: 2022-08-16 | Stop reason: SURG

## 2022-08-16 RX ORDER — PROCHLORPERAZINE EDISYLATE 5 MG/ML
5 INJECTION INTRAMUSCULAR; INTRAVENOUS EVERY 8 HOURS PRN
Status: DISCONTINUED | OUTPATIENT
Start: 2022-08-16 | End: 2022-08-16

## 2022-08-16 RX ORDER — HYDROMORPHONE HYDROCHLORIDE 1 MG/ML
0.6 INJECTION, SOLUTION INTRAMUSCULAR; INTRAVENOUS; SUBCUTANEOUS EVERY 5 MIN PRN
Status: DISCONTINUED | OUTPATIENT
Start: 2022-08-16 | End: 2022-08-16

## 2022-08-16 RX ORDER — DIPHENHYDRAMINE HYDROCHLORIDE 50 MG/ML
12.5 INJECTION INTRAMUSCULAR; INTRAVENOUS AS NEEDED
Status: DISCONTINUED | OUTPATIENT
Start: 2022-08-16 | End: 2022-08-16

## 2022-08-16 RX ORDER — BUPIVACAINE HYDROCHLORIDE 2.5 MG/ML
INJECTION, SOLUTION EPIDURAL; INFILTRATION; INTRACAUDAL AS NEEDED
Status: DISCONTINUED | OUTPATIENT
Start: 2022-08-16 | End: 2022-08-16 | Stop reason: HOSPADM

## 2022-08-16 RX ORDER — HYDROMORPHONE HYDROCHLORIDE 1 MG/ML
0.2 INJECTION, SOLUTION INTRAMUSCULAR; INTRAVENOUS; SUBCUTANEOUS EVERY 5 MIN PRN
Status: DISCONTINUED | OUTPATIENT
Start: 2022-08-16 | End: 2022-08-16

## 2022-08-16 RX ADMIN — ONDANSETRON 4 MG: 2 INJECTION INTRAMUSCULAR; INTRAVENOUS at 11:37:00

## 2022-08-16 RX ADMIN — ROCURONIUM BROMIDE 5 MG: 10 INJECTION, SOLUTION INTRAVENOUS at 10:38:00

## 2022-08-16 RX ADMIN — SODIUM CHLORIDE, SODIUM LACTATE, POTASSIUM CHLORIDE, CALCIUM CHLORIDE: 600; 310; 30; 20 INJECTION, SOLUTION INTRAVENOUS at 10:35:00

## 2022-08-16 RX ADMIN — DEXAMETHASONE SODIUM PHOSPHATE 8 MG: 4 MG/ML VIAL (ML) INJECTION at 10:46:00

## 2022-08-16 RX ADMIN — SODIUM CHLORIDE, SODIUM LACTATE, POTASSIUM CHLORIDE, CALCIUM CHLORIDE: 600; 310; 30; 20 INJECTION, SOLUTION INTRAVENOUS at 12:03:00

## 2022-08-16 RX ADMIN — LIDOCAINE HYDROCHLORIDE 50 MG: 10 INJECTION, SOLUTION EPIDURAL; INFILTRATION; INTRACAUDAL; PERINEURAL at 10:38:00

## 2022-08-16 RX ADMIN — MIDAZOLAM HYDROCHLORIDE 2 MG: 1 INJECTION INTRAMUSCULAR; INTRAVENOUS at 10:35:00

## 2022-08-16 NOTE — OPERATIVE REPORT
[address] Patient Status:  Hospital Outpatient Surgery    1990 MRN [id number]   [facility] SURGERY Attending Danny Rodriguez MD   Hosp Day # 0 PCP Marlyce Bamberger, MD     Thyroid/tonsillectomy Op Note  Pre-Op Diagnosis:  Left Thyroid nodule           Chronic tonsillitis  Post-Op Diagnosis:  Same  Procedure:   Hemithyroidectomy              Tonsillectomy  Surgeon: Marizol Oakley  Assistant:  Crista Spears  Anesthesia: General  Indications for Procedure:  Lico Aparicio is a very pleasant female with a history of a left thyroid nodule. Has a h/o recurrent tonsil infections as well. The above-named procedure was offered for definitive treatment. Procedure in Detail:  Patient taken to the operating room and laid supine on the operating table. A NIMS monitoring ET tube was used during the procedure for laryngeal nerve monitoring. After adequate IV and endotracheal anesthesia, the table was brought down slightly. A shoulder roll was placed. The patient was prepped and draped in standard fashion. The cricoid cartilage, sternal notch and thyroid notch were palpated and outlined with a surgical marker. Approximately 5cc of 1% lidocaine with epinephrine was injected. A curvilinear skin incision was made two fingerbreadths above the sternum. The subfascial and subcutaneous tissues were dissected with electrobovie cautery. The anterior layer of the deep cervical fascia was identified. Electrobovie cautery was used to incise at the midline. The median raphe was identified and the strap muscles were retracted laterally. The left thyroid lobe was visualized. Using blunt dissection, all remaining strap musculature was retracted laterally exposing only the thyroid. The superior portion of the lobe was retracted inferiorly and medially. With blunt dissection, the superior pole vasculature was visualized. These vessels were individually clipped for hemostasis.   The middle thyroid vein was then bluntly dissected and clipped. This allowed for further medial retraction of the thyroid lobe. Using the inferior thyroid artery for landmarks, the recurrent laryngeal nerve was identified. All thyroid tissue was elevated off the trachea while keeping visualization of the RLN. The isthmus was then clamped and divided. The remaining lobe was tied for hemostasis at the isthmus. The midline straps were reapproximated with interupted vicryls and the platysma and sub Q closed with vicryls. The skin was closed with a running monocryl suture and mastisol and steristrips used for dressing. All instruments were removed and the patient was given back to anesthesia. The sponge, needle and instrument counts were correct at the end of this portion. The table was turned right laterally 90 degrees. A shoulder roll was placed and a MacIvor mouth gag was inserted in the oral cavity with the patient suspended from a chu- standard fashion. Palpation of the soft palate revealed no cleft abnormality. Inspection of the oropharynx revealed 2 + tonsils. Attention was first drawn to the left tonsil. It was grasped with an Allis clamp and retracted anteromedially. Superior and lateral cuts were made with the electrobovie cautery. Blunt dissection was used to identify the tonsillar capsule. With this plain of dissection in view the tonsil was removed with electrobovie cautery. A tonsil sponge was placed. The right tonsil was removed in a similar fashion. Suction electrobovie cautery was then used to cauterize the superior, middle and inferior poles. The MacIvor mouth gag was relaxed and re-opened and there was no significant bleeding. Approximately 7cc of sensorcaine with epinephrine was injected total in the tonsillar fossas bilaterally. An OG Tube was placed down the stomach and suctioned. The nasopharynx was irrigated and suctioned.   All instruments were removed from the oral cavity and nose and the patient was given back to anesthesia and reversed without complications. The sponge, needle and instrument counts were correct at the end of the case. There were no complications. I performed all parts of this procedure. EBL:  10 cc  IVF:  100cc LR  Specimens:  Left thyroid lobe, tonsils separate  UO: None  Condition:   To PACU stable  Michael Estrada MD  8/16/2022  11:59 AM

## 2022-08-16 NOTE — ANESTHESIA PROCEDURE NOTES
Airway  Date/Time: 8/16/2022 10:39 AM  Urgency: elective    Airway not difficult    General Information and Staff    Patient location during procedure: OR  Anesthesiologist: Verónica Lopez MD  Resident/CRNA: Harsha Huerta CRNA  Performed: CRNA     Indications and Patient Condition  Indications for airway management: anesthesia  Sedation level: deep  Preoxygenated: yes  Patient position: sniffing  Mask difficulty assessment: 1 - vent by mask    Final Airway Details  Final airway type: endotracheal airway      Successful airway: ETT and NIM tube  Cuffed: yes   Successful intubation technique: direct laryngoscopy  Endotracheal tube insertion site: oral  Blade: GlideScope  Blade size: #3  ETT size (mm): 7.0    Cormack-Lehane Classification: grade I - full view of glottis  Placement verified by: chest auscultation and capnometry   Measured from: lips  ETT to lips (cm): 21  Number of attempts at approach: 1    Additional Comments  Glide Scope #3 used for NIM tube placement

## 2022-08-17 ENCOUNTER — HOSPITAL ENCOUNTER (OUTPATIENT)
Facility: HOSPITAL | Age: 32
Setting detail: OBSERVATION
Discharge: HOME | End: 2022-08-17
Attending: EMERGENCY MEDICINE | Admitting: INTERNAL MEDICINE
Payer: MEDICAID

## 2022-08-17 VITALS
OXYGEN SATURATION: 98 % | DIASTOLIC BLOOD PRESSURE: 73 MMHG | SYSTOLIC BLOOD PRESSURE: 108 MMHG | TEMPERATURE: 99 F | BODY MASS INDEX: 38.45 KG/M2 | WEIGHT: 217 LBS | RESPIRATION RATE: 18 BRPM | HEART RATE: 57 BPM | HEIGHT: 63 IN

## 2022-08-17 DIAGNOSIS — R52 INTRACTABLE PAIN: Primary | ICD-10-CM

## 2022-08-17 DIAGNOSIS — R11.0 NAUSEA: ICD-10-CM

## 2022-08-17 LAB — SARS-COV-2 RNA RESP QL NAA+PROBE: NOT DETECTED

## 2022-08-17 RX ORDER — PROCHLORPERAZINE EDISYLATE 5 MG/ML
5 INJECTION INTRAMUSCULAR; INTRAVENOUS EVERY 8 HOURS PRN
Status: DISCONTINUED | OUTPATIENT
Start: 2022-08-17 | End: 2022-08-17

## 2022-08-17 RX ORDER — METOCLOPRAMIDE 10 MG/1
10 TABLET ORAL EVERY 6 HOURS PRN
Qty: 20 TABLET | Refills: 0 | Status: SHIPPED | OUTPATIENT
Start: 2022-08-17

## 2022-08-17 RX ORDER — SODIUM PHOSPHATE, DIBASIC AND SODIUM PHOSPHATE, MONOBASIC 7; 19 G/133ML; G/133ML
1 ENEMA RECTAL ONCE AS NEEDED
Status: DISCONTINUED | OUTPATIENT
Start: 2022-08-17 | End: 2022-08-17

## 2022-08-17 RX ORDER — MORPHINE SULFATE 4 MG/ML
4 INJECTION, SOLUTION INTRAMUSCULAR; INTRAVENOUS EVERY 2 HOUR PRN
Status: DISCONTINUED | OUTPATIENT
Start: 2022-08-17 | End: 2022-08-17

## 2022-08-17 RX ORDER — HYDROCODONE BITARTRATE AND ACETAMINOPHEN 5; 325 MG/1; MG/1
2 TABLET ORAL EVERY 4 HOURS PRN
Status: DISCONTINUED | OUTPATIENT
Start: 2022-08-17 | End: 2022-08-17

## 2022-08-17 RX ORDER — SODIUM CHLORIDE 9 MG/ML
INJECTION, SOLUTION INTRAVENOUS CONTINUOUS
Status: DISCONTINUED | OUTPATIENT
Start: 2022-08-17 | End: 2022-08-17

## 2022-08-17 RX ORDER — DEXAMETHASONE SODIUM PHOSPHATE 10 MG/ML
10 INJECTION, SOLUTION INTRAMUSCULAR; INTRAVENOUS 3 TIMES DAILY
Status: DISCONTINUED | OUTPATIENT
Start: 2022-08-17 | End: 2022-08-17

## 2022-08-17 RX ORDER — BISACODYL 10 MG
10 SUPPOSITORY, RECTAL RECTAL
Status: DISCONTINUED | OUTPATIENT
Start: 2022-08-17 | End: 2022-08-17

## 2022-08-17 RX ORDER — POLYETHYLENE GLYCOL 3350 17 G/17G
17 POWDER, FOR SOLUTION ORAL DAILY PRN
Status: DISCONTINUED | OUTPATIENT
Start: 2022-08-17 | End: 2022-08-17

## 2022-08-17 RX ORDER — HYDROMORPHONE HYDROCHLORIDE 1 MG/ML
0.5 INJECTION, SOLUTION INTRAMUSCULAR; INTRAVENOUS; SUBCUTANEOUS ONCE
Status: COMPLETED | OUTPATIENT
Start: 2022-08-17 | End: 2022-08-17

## 2022-08-17 RX ORDER — ONDANSETRON 2 MG/ML
4 INJECTION INTRAMUSCULAR; INTRAVENOUS EVERY 6 HOURS PRN
Status: DISCONTINUED | OUTPATIENT
Start: 2022-08-17 | End: 2022-08-17

## 2022-08-17 RX ORDER — MORPHINE SULFATE 2 MG/ML
1 INJECTION, SOLUTION INTRAMUSCULAR; INTRAVENOUS EVERY 2 HOUR PRN
Status: DISCONTINUED | OUTPATIENT
Start: 2022-08-17 | End: 2022-08-17

## 2022-08-17 RX ORDER — SODIUM CHLORIDE 9 MG/ML
INJECTION, SOLUTION INTRAVENOUS ONCE
Status: COMPLETED | OUTPATIENT
Start: 2022-08-17 | End: 2022-08-17

## 2022-08-17 RX ORDER — SENNOSIDES 8.6 MG
17.2 TABLET ORAL NIGHTLY PRN
Status: DISCONTINUED | OUTPATIENT
Start: 2022-08-17 | End: 2022-08-17

## 2022-08-17 RX ORDER — DIPHENHYDRAMINE HYDROCHLORIDE 50 MG/ML
25 INJECTION INTRAMUSCULAR; INTRAVENOUS ONCE
Status: COMPLETED | OUTPATIENT
Start: 2022-08-17 | End: 2022-08-17

## 2022-08-17 RX ORDER — HYDROCODONE BITARTRATE AND ACETAMINOPHEN 5; 325 MG/1; MG/1
1 TABLET ORAL EVERY 4 HOURS PRN
Status: DISCONTINUED | OUTPATIENT
Start: 2022-08-17 | End: 2022-08-17

## 2022-08-17 RX ORDER — DEXAMETHASONE SODIUM PHOSPHATE 10 MG/ML
10 INJECTION, SOLUTION INTRAMUSCULAR; INTRAVENOUS ONCE
Status: COMPLETED | OUTPATIENT
Start: 2022-08-17 | End: 2022-08-17

## 2022-08-17 RX ORDER — MORPHINE SULFATE 2 MG/ML
2 INJECTION, SOLUTION INTRAMUSCULAR; INTRAVENOUS EVERY 2 HOUR PRN
Status: DISCONTINUED | OUTPATIENT
Start: 2022-08-17 | End: 2022-08-17

## 2022-08-17 RX ORDER — METOCLOPRAMIDE HYDROCHLORIDE 5 MG/ML
10 INJECTION INTRAMUSCULAR; INTRAVENOUS ONCE
Status: COMPLETED | OUTPATIENT
Start: 2022-08-17 | End: 2022-08-17

## 2022-08-17 NOTE — ED INITIAL ASSESSMENT (HPI)
C/o increasing pain to tonsil area and \"gasping for breath\" when she woke up, states this happened 3x. Pt had tonsillectomy and partial thyroidectomy this morning. Pt speaking clearly in triage, in no obvious distress.

## 2022-08-17 NOTE — PROGRESS NOTES
NURSING DISCHARGE NOTE    Discharged Home via Wheelchair. Accompanied by Friend  Belongings Taken by patient/family. IV taken out. Discharge instructions given to patient. Patient verbalized understanding. Prescription sent to patient's pharmacy.

## 2022-08-17 NOTE — ED QUICK NOTES
Orders for admission, patient is aware of plan and ready to go upstairs.  Any questions, please call ED RN Seth David at extension [phone]    Patient Covid vaccination status: Unvaccinated     COVID Test Ordered in ED: Rapid SARS-CoV-2 by PCR    COVID Suspicion at Admission: Low clinical suspicion for COVID    Running Infusions:      Mental Status/LOC at time of transport: A/Ox4    Other pertinent information:   CIWA score: N/A   NIH score:  N/A

## 2022-08-17 NOTE — PROGRESS NOTES
Pt axox4, resting in bed. Call light within reach. Bed in low position. Pt received from the ER on bed.

## 2022-09-06 ENCOUNTER — PATIENT MESSAGE (OUTPATIENT)
Dept: FAMILY MEDICINE CLINIC | Facility: CLINIC | Age: 32
End: 2022-09-06

## 2022-09-06 NOTE — TELEPHONE ENCOUNTER
From: Sanjuana Blankenship  Sent: 9/6/2022 9:29 AM CDT  To: [facility] Clinical Staff  Subject: Hello    Does he have any locations [facility] [place] [facility]?

## 2022-09-06 NOTE — TELEPHONE ENCOUNTER
From: Theodore Elias  To: Antione Rahman MD  Sent: 9/6/2022 1:02 AM CDT  Subject: Jyothi Garciaels been having this pain in my back on the right side and wanted to know if it possible to get some scans to see if it could be kidney stones or some inflammation in my spine that causing the pain? I was also told I had some pee in my blood during a dot scan.

## 2022-09-06 NOTE — TELEPHONE ENCOUNTER
From: [employer]  Sent: 9/6/2022 10:55 AM CDT  To: [facility] Clinical Staff  Subject: walk-in clinic    Would I be able to do a video call then? Cause I moved to [facility] and my car is currently out of service.

## 2022-10-05 ENCOUNTER — HOSPITAL ENCOUNTER (EMERGENCY)
Facility: HOSPITAL | Age: 32
Discharge: HOME | End: 2022-10-05
Attending: EMERGENCY MEDICINE
Payer: MEDICAID

## 2022-10-05 ENCOUNTER — APPOINTMENT (OUTPATIENT)
Dept: MRI IMAGING | Facility: HOSPITAL | Age: 32
End: 2022-10-05
Attending: EMERGENCY MEDICINE
Payer: MEDICAID

## 2022-10-05 VITALS
RESPIRATION RATE: 20 BRPM | SYSTOLIC BLOOD PRESSURE: 127 MMHG | TEMPERATURE: 98 F | WEIGHT: 209 LBS | OXYGEN SATURATION: 98 % | DIASTOLIC BLOOD PRESSURE: 78 MMHG | HEART RATE: 71 BPM | BODY MASS INDEX: 37 KG/M2

## 2022-10-05 DIAGNOSIS — R20.0 NUMBNESS AND TINGLING: Primary | ICD-10-CM

## 2022-10-05 DIAGNOSIS — G35 MS (MULTIPLE SCLEROSIS) (HCC): ICD-10-CM

## 2022-10-05 DIAGNOSIS — R20.2 NUMBNESS AND TINGLING: Primary | ICD-10-CM

## 2022-10-05 LAB
ALBUMIN SERPL-MCNC: 3.8 G/DL (ref 3.4–5)
ALBUMIN/GLOB SERPL: 0.9 {RATIO} (ref 1–2)
ALP LIVER SERPL-CCNC: 87 U/L
ALT SERPL-CCNC: 22 U/L
ANION GAP SERPL CALC-SCNC: 3 MMOL/L (ref 0–18)
AST SERPL-CCNC: 30 U/L (ref 15–37)
B-HCG UR QL: NEGATIVE
BASOPHILS # BLD AUTO: 0.05 X10(3) UL (ref 0–0.2)
BASOPHILS NFR BLD AUTO: 0.5 %
BILIRUB SERPL-MCNC: 0.4 MG/DL (ref 0.1–2)
BILIRUB UR QL STRIP.AUTO: NEGATIVE
BUN BLD-MCNC: 8 MG/DL (ref 7–18)
CALCIUM BLD-MCNC: 9.3 MG/DL (ref 8.5–10.1)
CHLORIDE SERPL-SCNC: 108 MMOL/L (ref 98–112)
CLARITY UR REFRACT.AUTO: CLEAR
CO2 SERPL-SCNC: 29 MMOL/L (ref 21–32)
COLOR UR AUTO: YELLOW
CREAT BLD-MCNC: 0.85 MG/DL
EOSINOPHIL # BLD AUTO: 0.13 X10(3) UL (ref 0–0.7)
EOSINOPHIL NFR BLD AUTO: 1.4 %
ERYTHROCYTE [DISTWIDTH] IN BLOOD BY AUTOMATED COUNT: 12.7 %
GFR SERPLBLD BASED ON 1.73 SQ M-ARVRAT: 94 ML/MIN/1.73M2 (ref 60–?)
GLOBULIN PLAS-MCNC: 4.2 G/DL (ref 2.8–4.4)
GLUCOSE BLD-MCNC: 88 MG/DL (ref 70–99)
GLUCOSE UR STRIP.AUTO-MCNC: NEGATIVE MG/DL
HCT VFR BLD AUTO: 39 %
HGB BLD-MCNC: 13 G/DL
IMM GRANULOCYTES # BLD AUTO: 0.02 X10(3) UL (ref 0–1)
IMM GRANULOCYTES NFR BLD: 0.2 %
KETONES UR STRIP.AUTO-MCNC: NEGATIVE MG/DL
LEUKOCYTE ESTERASE UR QL STRIP.AUTO: NEGATIVE
LYMPHOCYTES # BLD AUTO: 2.94 X10(3) UL (ref 1–4)
LYMPHOCYTES NFR BLD AUTO: 31.5 %
MAGNESIUM SERPL-MCNC: 2.2 MG/DL (ref 1.6–2.6)
MCH RBC QN AUTO: 31 PG (ref 26–34)
MCHC RBC AUTO-ENTMCNC: 33.3 G/DL (ref 31–37)
MCV RBC AUTO: 92.9 FL
MONOCYTES # BLD AUTO: 0.92 X10(3) UL (ref 0.1–1)
MONOCYTES NFR BLD AUTO: 9.9 %
NEUTROPHILS # BLD AUTO: 5.28 X10 (3) UL (ref 1.5–7.7)
NEUTROPHILS # BLD AUTO: 5.28 X10(3) UL (ref 1.5–7.7)
NEUTROPHILS NFR BLD AUTO: 56.5 %
NITRITE UR QL STRIP.AUTO: NEGATIVE
OSMOLALITY SERPL CALC.SUM OF ELEC: 288 MOSM/KG (ref 275–295)
PH UR STRIP.AUTO: 6 [PH] (ref 5–8)
PLATELET # BLD AUTO: 259 10(3)UL (ref 150–450)
POTASSIUM SERPL-SCNC: 3.7 MMOL/L (ref 3.5–5.1)
PROT SERPL-MCNC: 8 G/DL (ref 6.4–8.2)
PROT UR STRIP.AUTO-MCNC: NEGATIVE MG/DL
RBC # BLD AUTO: 4.2 X10(6)UL
SARS-COV-2 RNA RESP QL NAA+PROBE: NOT DETECTED
SODIUM SERPL-SCNC: 140 MMOL/L (ref 136–145)
SP GR UR STRIP.AUTO: 1.02 (ref 1–1.03)
TSI SER-ACNC: 3.74 MIU/ML (ref 0.36–3.74)
UROBILINOGEN UR STRIP.AUTO-MCNC: <2 MG/DL
WBC # BLD AUTO: 9.3 X10(3) UL (ref 4–11)

## 2022-10-05 PROCEDURE — 84443 ASSAY THYROID STIM HORMONE: CPT | Performed by: EMERGENCY MEDICINE

## 2022-10-05 PROCEDURE — 80053 COMPREHEN METABOLIC PANEL: CPT | Performed by: EMERGENCY MEDICINE

## 2022-10-05 PROCEDURE — 96365 THER/PROPH/DIAG IV INF INIT: CPT

## 2022-10-05 PROCEDURE — 81025 URINE PREGNANCY TEST: CPT

## 2022-10-05 PROCEDURE — 99284 EMERGENCY DEPT VISIT MOD MDM: CPT

## 2022-10-05 PROCEDURE — 83735 ASSAY OF MAGNESIUM: CPT | Performed by: EMERGENCY MEDICINE

## 2022-10-05 PROCEDURE — A9575 INJ GADOTERATE MEGLUMI 0.1ML: HCPCS | Performed by: EMERGENCY MEDICINE

## 2022-10-05 PROCEDURE — 81001 URINALYSIS AUTO W/SCOPE: CPT | Performed by: EMERGENCY MEDICINE

## 2022-10-05 PROCEDURE — 99285 EMERGENCY DEPT VISIT HI MDM: CPT

## 2022-10-05 PROCEDURE — 70553 MRI BRAIN STEM W/O & W/DYE: CPT | Performed by: EMERGENCY MEDICINE

## 2022-10-05 PROCEDURE — 85025 COMPLETE CBC W/AUTO DIFF WBC: CPT | Performed by: EMERGENCY MEDICINE

## 2022-10-05 NOTE — ED INITIAL ASSESSMENT (HPI)
Weakness to lt side last few day,  More frequent today     Numbness at time to lt face   Sx intermittent.  No sx now

## 2022-10-06 ENCOUNTER — TELEPHONE (OUTPATIENT)
Dept: SURGERY | Facility: CLINIC | Age: 32
End: 2022-10-06

## 2022-10-06 RX ORDER — PREDNISONE 20 MG/1
TABLET ORAL
Qty: 18 TABLET | Refills: 0 | Status: SHIPPED | OUTPATIENT
Start: 2022-10-06 | End: 2022-10-15

## 2022-10-06 NOTE — TELEPHONE ENCOUNTER
Can try prednisone taper instead, for more mild symptoms and s/p 1g in ED. If symptoms get worse, should call us back.

## 2022-10-06 NOTE — TELEPHONE ENCOUNTER
Amelia Torres from THE [facility] ER calling requesting to speak to dr manuel's nurse. pt currently in ED. Transferred call to nurse.

## 2022-10-06 NOTE — TELEPHONE ENCOUNTER
Anthony Reyes, , states pt received 1 g solumedrol in ED last night, previous  indicated subsequent infusions were recommended but not ordered.   Orders not placed, routed to provider to advse

## 2022-10-06 NOTE — CM/SW NOTE
Spoke to [address] [place] [address] [phone] from Dr. Marialuisa Fish office about patient being seen last night for MS and given a dose of solu medrol in the ED. I was informed in handoff that the patient needed more doses of solu medrol which can be given at the [facility] center. I do not have any actual orders for this though. Myra Morgan states that she will send a message to Dr. Monet Javier to confirm this is what she wants to do since they can set up patients to receive this at the [facility].

## 2022-10-10 ENCOUNTER — PATIENT MESSAGE (OUTPATIENT)
Dept: FAMILY MEDICINE CLINIC | Facility: CLINIC | Age: 32
End: 2022-10-10

## 2022-10-16 ENCOUNTER — PATIENT MESSAGE (OUTPATIENT)
Dept: FAMILY MEDICINE CLINIC | Facility: CLINIC | Age: 32
End: 2022-10-16

## 2022-10-27 ENCOUNTER — PATIENT MESSAGE (OUTPATIENT)
Dept: NEUROLOGY | Facility: CLINIC | Age: 32
End: 2022-10-27

## 2022-11-08 NOTE — TELEPHONE ENCOUNTER
Patient did not read [employer] message sent on 11/2. Spoke with patient today and scheduled patient 12/1 at 1:55.

## 2022-11-30 ENCOUNTER — PATIENT MESSAGE (OUTPATIENT)
Dept: FAMILY MEDICINE CLINIC | Facility: CLINIC | Age: 32
End: 2022-11-30

## 2022-12-05 ENCOUNTER — PATIENT MESSAGE (OUTPATIENT)
Dept: FAMILY MEDICINE CLINIC | Facility: CLINIC | Age: 32
End: 2022-12-05

## 2022-12-05 NOTE — TELEPHONE ENCOUNTER
Pt sent message c/o lower right sided back pain that when she stands takes her breath away. Denies urinary symptoms, fall/injury. Pt has nv scheduled for 12/06/22. Please advise. Thank you.    LOV: 07/25/22

## 2022-12-05 NOTE — TELEPHONE ENCOUNTER
From: Iam Ponce  To: Devaughn Mathis MD  Sent: 12/5/2022 8:08 AM CST  Subject: Cristina Saenz    I have an appt tomorrow with you guys for my depot but wanted to know if you have anything for today before 12? I ended up having off today and I been having this weird pain in my lower back to the right side but I only feel it when I stand not when I lay down. And it takes my breath away.

## 2022-12-06 ENCOUNTER — TELEPHONE (OUTPATIENT)
Dept: NEUROLOGY | Facility: CLINIC | Age: 32
End: 2022-12-06

## 2022-12-06 ENCOUNTER — MED REC SCAN ONLY (OUTPATIENT)
Dept: FAMILY MEDICINE CLINIC | Facility: CLINIC | Age: 32
End: 2022-12-06

## 2022-12-06 ENCOUNTER — NURSE ONLY (OUTPATIENT)
Dept: FAMILY MEDICINE CLINIC | Facility: CLINIC | Age: 32
End: 2022-12-06
Payer: MEDICAID

## 2022-12-06 DIAGNOSIS — Z30.42 ENCOUNTER FOR SURVEILLANCE OF INJECTABLE CONTRACEPTIVE: Primary | ICD-10-CM

## 2022-12-06 LAB
CONTROL LINE PRESENT WITH A CLEAR BACKGROUND (YES/NO): YES YES/NO
KIT EXPIRATION DATE: NORMAL DATE
KIT LOT #: NORMAL NUMERIC
PREGNANCY TEST, URINE: NEGATIVE

## 2022-12-06 PROCEDURE — 96372 THER/PROPH/DIAG INJ SC/IM: CPT | Performed by: EMERGENCY MEDICINE

## 2022-12-06 PROCEDURE — 81025 URINE PREGNANCY TEST: CPT | Performed by: EMERGENCY MEDICINE

## 2022-12-06 RX ORDER — MEDROXYPROGESTERONE ACETATE 150 MG/ML
150 INJECTION, SUSPENSION INTRAMUSCULAR ONCE
Status: COMPLETED | OUTPATIENT
Start: 2022-12-06 | End: 2022-12-06

## 2022-12-06 RX ADMIN — MEDROXYPROGESTERONE ACETATE 150 MG: 150 INJECTION, SUSPENSION INTRAMUSCULAR at 10:28:00

## 2022-12-06 NOTE — TELEPHONE ENCOUNTER
Tried to call patient and schedule appointment on Friday. No answer. My chart message was sent regarding appointment.

## 2022-12-18 ENCOUNTER — PATIENT MESSAGE (OUTPATIENT)
Dept: FAMILY MEDICINE CLINIC | Facility: CLINIC | Age: 32
End: 2022-12-18

## 2023-02-22 ENCOUNTER — OFFICE VISIT (OUTPATIENT)
Dept: NEUROLOGY | Facility: CLINIC | Age: 33
End: 2023-02-22
Payer: MEDICAID

## 2023-02-22 VITALS — HEART RATE: 66 BPM | RESPIRATION RATE: 16 BRPM | SYSTOLIC BLOOD PRESSURE: 124 MMHG | DIASTOLIC BLOOD PRESSURE: 78 MMHG

## 2023-02-22 DIAGNOSIS — R41.3 SHORT-TERM MEMORY LOSS: ICD-10-CM

## 2023-02-22 DIAGNOSIS — R41.840 ATTENTION AND CONCENTRATION DEFICIT: ICD-10-CM

## 2023-02-22 DIAGNOSIS — G35 MS (MULTIPLE SCLEROSIS) (HCC): Primary | ICD-10-CM

## 2023-02-22 DIAGNOSIS — R20.2 ARM PARESTHESIA, LEFT: ICD-10-CM

## 2023-02-22 PROCEDURE — 3074F SYST BP LT 130 MM HG: CPT | Performed by: OTHER

## 2023-02-22 PROCEDURE — 99244 OFF/OP CNSLTJ NEW/EST MOD 40: CPT | Performed by: OTHER

## 2023-02-22 PROCEDURE — 3078F DIAST BP <80 MM HG: CPT | Performed by: OTHER

## 2023-02-22 NOTE — PROGRESS NOTES
Patient here to follow up regarding MS. States that her memory has been worse since last visit and experiences a tingling sensation in her body 5-6x per day, worse on the left side.

## 2023-02-23 DIAGNOSIS — G35 MS (MULTIPLE SCLEROSIS) (HCC): Primary | ICD-10-CM

## 2023-02-24 NOTE — TELEPHONE ENCOUNTER
Once labs completed PA can be sent electronically, per Epic review, for either Dimethyl fumarate or brand Tecfidera. Tecfidera will have plan limits. Sent pt a [employer] message to complete labs so RX can be sent.

## 2023-03-11 LAB
ABSOLUTE BASOPHILS: 52 CELLS/UL (ref 0–200)
ABSOLUTE EOSINOPHILS: 81 CELLS/UL (ref 15–500)
ABSOLUTE LYMPHOCYTES: 1991 CELLS/UL (ref 850–3900)
ABSOLUTE MONOCYTES: 540 CELLS/UL (ref 200–950)
ABSOLUTE NEUTROPHILS: 4736 CELLS/UL (ref 1500–7800)
ALBUMIN/GLOBULIN RATIO: 1.4 (CALC) (ref 1–2.5)
ALBUMIN: 3.9 G/DL (ref 3.6–5.1)
ALKALINE PHOSPHATASE: 68 U/L (ref 31–125)
ALT: 12 U/L (ref 6–29)
AST: 17 U/L (ref 10–30)
BASOPHILS: 0.7 %
BILIRUBIN, TOTAL: 0.6 MG/DL (ref 0.2–1.2)
BUN: 8 MG/DL (ref 7–25)
CALCIUM: 8.8 MG/DL (ref 8.6–10.2)
CARBON DIOXIDE: 25 MMOL/L (ref 20–32)
CHLORIDE: 109 MMOL/L (ref 98–110)
CREATININE: 0.82 MG/DL (ref 0.5–0.97)
EGFR: 98 ML/MIN/1.73M2
EOSINOPHILS: 1.1 %
GLOBULIN: 2.7 G/DL (CALC) (ref 1.9–3.7)
GLUCOSE: 88 MG/DL (ref 65–99)
HEMATOCRIT: 36.6 % (ref 35–45)
HEMOGLOBIN: 12.3 G/DL (ref 11.7–15.5)
LYMPHOCYTES: 26.9 %
MCH: 30.4 PG (ref 27–33)
MCHC: 33.6 G/DL (ref 32–36)
MCV: 90.6 FL (ref 80–100)
MONOCYTES: 7.3 %
MPV: 10.4 FL (ref 7.5–12.5)
NEUTROPHILS: 64 %
PLATELET COUNT: 258 THOUSAND/UL (ref 140–400)
POTASSIUM: 4 MMOL/L (ref 3.5–5.3)
PROTEIN, TOTAL: 6.6 G/DL (ref 6.1–8.1)
RDW: 11.4 % (ref 11–15)
RED BLOOD CELL COUNT: 4.04 MILLION/UL (ref 3.8–5.1)
SODIUM: 140 MMOL/L (ref 135–146)
TSH W/REFLEX TO FT4: 1.34 MIU/L
VITAMIN B12: 339 PG/ML (ref 200–1100)
VITAMIN D, 25-OH, TOTAL: 19 NG/ML (ref 30–100)
WHITE BLOOD CELL COUNT: 7.4 THOUSAND/UL (ref 3.8–10.8)

## 2023-03-15 RX ORDER — DIMETHYL FUMARATE 120 MG/1
120 CAPSULE ORAL 2 TIMES DAILY
Qty: 14 CAPSULE | Refills: 0 | Status: SHIPPED | OUTPATIENT
Start: 2023-03-15 | End: 2023-03-27

## 2023-03-15 RX ORDER — DIMETHYL FUMARATE 240 MG/1
240 CAPSULE ORAL 2 TIMES DAILY
Qty: 60 CAPSULE | Refills: 3 | Status: SHIPPED | OUTPATIENT
Start: 2023-03-22 | End: 2023-03-27

## 2023-03-15 NOTE — TELEPHONE ENCOUNTER
All labs needed for Dimethyl Fumarate start completed and WNL. Dimethyl Fumarate pended and routed to Dr. Jesus Gaitan for review and signature.

## 2023-03-15 NOTE — TELEPHONE ENCOUNTER
Vit D low   Recommend that she start 4000 units daily of Vit D. Also, Vit B12 low.   Recommend that she start 1000mcg daily

## 2023-03-17 ENCOUNTER — HOSPITAL ENCOUNTER (OUTPATIENT)
Dept: MRI IMAGING | Facility: HOSPITAL | Age: 33
Discharge: HOME | End: 2023-03-17
Attending: Other
Payer: MEDICAID

## 2023-03-17 DIAGNOSIS — G35 MS (MULTIPLE SCLEROSIS) (HCC): ICD-10-CM

## 2023-03-17 PROCEDURE — 72157 MRI CHEST SPINE W/O & W/DYE: CPT | Performed by: OTHER

## 2023-03-17 PROCEDURE — 72156 MRI NECK SPINE W/O & W/DYE: CPT | Performed by: OTHER

## 2023-03-17 PROCEDURE — A9575 INJ GADOTERATE MEGLUMI 0.1ML: HCPCS | Performed by: OTHER

## 2023-03-17 RX ORDER — GADOTERATE MEGLUMINE 376.9 MG/ML
20 INJECTION INTRAVENOUS
Status: COMPLETED | OUTPATIENT
Start: 2023-03-17 | End: 2023-03-17

## 2023-03-17 RX ADMIN — GADOTERATE MEGLUMINE 20 ML: 376.9 INJECTION INTRAVENOUS at 12:38:00

## 2023-03-23 ENCOUNTER — PATIENT MESSAGE (OUTPATIENT)
Dept: NEUROLOGY | Facility: CLINIC | Age: 33
End: 2023-03-23

## 2023-03-27 ENCOUNTER — TELEPHONE (OUTPATIENT)
Dept: NEUROLOGY | Facility: CLINIC | Age: 33
End: 2023-03-27

## 2023-03-27 RX ORDER — DIMETHYL FUMARATE 240 MG/1
240 CAPSULE ORAL 2 TIMES DAILY
Qty: 60 CAPSULE | Refills: 3 | Status: SHIPPED | OUTPATIENT
Start: 2023-04-06 | End: 2023-08-04

## 2023-03-27 RX ORDER — DIMETHYL FUMARATE 120 MG/1
120 CAPSULE ORAL 2 TIMES DAILY
Qty: 14 CAPSULE | Refills: 0 | Status: SHIPPED | OUTPATIENT
Start: 2023-03-27 | End: 2023-04-03

## 2023-03-27 NOTE — TELEPHONE ENCOUNTER
Additional Dx codes requested by [facility] for insurance approval of labs ordered. All Dx codes forom LOV faxed to [facility] with fax confirmation received.

## 2023-03-27 NOTE — TELEPHONE ENCOUNTER
Received fax from [address] [place] [address],[address] stating Dimethyl Fumarate denied as patient needs to have tried and failed Tecfidera,Copaxone, Rebif, Betaseron, Gilenya. Rx Tecfidera 120mg #14 and Tecfidera 240mg #60 (PATRICK) e scribed to [facility] per written order.

## 2023-03-28 ENCOUNTER — TELEPHONE (OUTPATIENT)
Dept: SURGERY | Facility: CLINIC | Age: 33
End: 2023-03-28

## 2023-03-28 NOTE — TELEPHONE ENCOUNTER
[facility] called stating they cannot fill the RX TECFIDERA 120 MG Oral Capsule Delayed Release. It must be filled via a specialty pharm. He provided [address], phone number [phone]. Endorsed to RN for provider.

## 2023-04-12 ENCOUNTER — NURSE ONLY (OUTPATIENT)
Dept: FAMILY MEDICINE CLINIC | Facility: CLINIC | Age: 33
End: 2023-04-12
Payer: MEDICAID

## 2023-04-12 DIAGNOSIS — Z30.42 ENCOUNTER FOR SURVEILLANCE OF INJECTABLE CONTRACEPTIVE: Primary | ICD-10-CM

## 2023-04-12 LAB
CONTROL LINE PRESENT WITH A CLEAR BACKGROUND (YES/NO): YES YES/NO
PREGNANCY TEST, URINE: NEGATIVE

## 2023-04-12 PROCEDURE — 81025 URINE PREGNANCY TEST: CPT | Performed by: FAMILY MEDICINE

## 2023-04-12 PROCEDURE — 96372 THER/PROPH/DIAG INJ SC/IM: CPT | Performed by: FAMILY MEDICINE

## 2023-04-12 RX ORDER — MEDROXYPROGESTERONE ACETATE 150 MG/ML
150 INJECTION, SUSPENSION INTRAMUSCULAR ONCE
Status: COMPLETED | OUTPATIENT
Start: 2023-04-12 | End: 2023-04-12

## 2023-04-12 RX ADMIN — MEDROXYPROGESTERONE ACETATE 150 MG: 150 INJECTION, SUSPENSION INTRAMUSCULAR at 11:30:00

## 2023-04-26 ENCOUNTER — OFFICE VISIT (OUTPATIENT)
Dept: FAMILY MEDICINE CLINIC | Facility: CLINIC | Age: 33
End: 2023-04-26
Payer: MEDICAID

## 2023-04-26 ENCOUNTER — PATIENT MESSAGE (OUTPATIENT)
Dept: FAMILY MEDICINE CLINIC | Facility: CLINIC | Age: 33
End: 2023-04-26

## 2023-04-26 VITALS
BODY MASS INDEX: 41.11 KG/M2 | HEIGHT: 63 IN | OXYGEN SATURATION: 99 % | HEART RATE: 71 BPM | RESPIRATION RATE: 18 BRPM | DIASTOLIC BLOOD PRESSURE: 68 MMHG | SYSTOLIC BLOOD PRESSURE: 114 MMHG | WEIGHT: 232 LBS | TEMPERATURE: 98 F

## 2023-04-26 DIAGNOSIS — R52 BODY ACHES: ICD-10-CM

## 2023-04-26 DIAGNOSIS — R50.9 FEVER, UNSPECIFIED FEVER CAUSE: Primary | ICD-10-CM

## 2023-04-26 PROCEDURE — 99213 OFFICE O/P EST LOW 20 MIN: CPT | Performed by: PHYSICIAN ASSISTANT

## 2023-04-26 PROCEDURE — 3074F SYST BP LT 130 MM HG: CPT | Performed by: PHYSICIAN ASSISTANT

## 2023-04-26 PROCEDURE — 3008F BODY MASS INDEX DOCD: CPT | Performed by: PHYSICIAN ASSISTANT

## 2023-04-26 PROCEDURE — 3078F DIAST BP <80 MM HG: CPT | Performed by: PHYSICIAN ASSISTANT

## 2023-04-26 NOTE — PATIENT INSTRUCTIONS
Tylenol or ibuprofen OTC as needed for pain/fever   Fluids   Need to be 24 hours fever free without medication and covid negative prior to returning to work   Recheck if other symptoms develop or fever persists

## 2023-04-26 NOTE — TELEPHONE ENCOUNTER
From: Eula Argueta  To: Allayne Romberg, MD  Sent: 4/26/2023 8:48 AM CDT  Subject: Hello    Hi I had to call off today because of a fever of 102 and body aches and I wanted to know if I could get a dr note for missing work today? And allowed to return tomorrow?

## 2023-04-27 LAB — SARS-COV-2 RNA RESP QL NAA+PROBE: NOT DETECTED

## 2023-05-28 ENCOUNTER — PATIENT MESSAGE (OUTPATIENT)
Dept: NEUROLOGY | Facility: CLINIC | Age: 33
End: 2023-05-28

## 2023-05-30 NOTE — TELEPHONE ENCOUNTER
From: Tegan Flores  To: Andres Rodrigues DO  Sent: 5/28/2023 2:53 PM CDT  Subject: headaches    chris been getting mors and more headaches through out the day that last for hours turning into migraines. is there anything i can get to help them? for the last two weeks I've been getting one every other day.

## 2023-06-02 RX ORDER — METHYLPREDNISOLONE 4 MG/1
TABLET ORAL
Qty: 1 EACH | Refills: 0 | Status: SHIPPED | OUTPATIENT
Start: 2023-06-02

## 2023-06-02 RX ORDER — TOPIRAMATE 25 MG/1
TABLET ORAL
Qty: 120 TABLET | Refills: 1 | Status: SHIPPED | OUTPATIENT
Start: 2023-06-02

## 2023-06-13 ENCOUNTER — OFFICE VISIT (OUTPATIENT)
Dept: FAMILY MEDICINE CLINIC | Facility: CLINIC | Age: 33
End: 2023-06-13
Payer: MEDICAID

## 2023-06-13 VITALS
HEART RATE: 63 BPM | OXYGEN SATURATION: 97 % | BODY MASS INDEX: 41.12 KG/M2 | RESPIRATION RATE: 18 BRPM | SYSTOLIC BLOOD PRESSURE: 124 MMHG | DIASTOLIC BLOOD PRESSURE: 86 MMHG | HEIGHT: 63.2 IN | WEIGHT: 235 LBS | TEMPERATURE: 97 F

## 2023-06-13 DIAGNOSIS — Z12.4 CERVICAL CANCER SCREENING: ICD-10-CM

## 2023-06-13 DIAGNOSIS — G35 MULTIPLE SCLEROSIS (HCC): ICD-10-CM

## 2023-06-13 DIAGNOSIS — Z13.6 SCREENING FOR CARDIOVASCULAR CONDITION: ICD-10-CM

## 2023-06-13 DIAGNOSIS — G89.29 CHRONIC MIDLINE LOW BACK PAIN WITHOUT SCIATICA: ICD-10-CM

## 2023-06-13 DIAGNOSIS — M54.50 CHRONIC MIDLINE LOW BACK PAIN WITHOUT SCIATICA: ICD-10-CM

## 2023-06-13 DIAGNOSIS — N89.8 VAGINAL DISCHARGE: ICD-10-CM

## 2023-06-13 DIAGNOSIS — N64.4 BREAST PAIN: ICD-10-CM

## 2023-06-13 DIAGNOSIS — Z80.0 FAMILY HX OF COLON CANCER: ICD-10-CM

## 2023-06-13 DIAGNOSIS — Z00.00 ANNUAL PHYSICAL EXAM: Primary | ICD-10-CM

## 2023-06-13 DIAGNOSIS — K92.1 BLOOD IN STOOL: ICD-10-CM

## 2023-06-13 PROCEDURE — 3008F BODY MASS INDEX DOCD: CPT | Performed by: FAMILY MEDICINE

## 2023-06-13 PROCEDURE — 87480 CANDIDA DNA DIR PROBE: CPT | Performed by: FAMILY MEDICINE

## 2023-06-13 PROCEDURE — 3079F DIAST BP 80-89 MM HG: CPT | Performed by: FAMILY MEDICINE

## 2023-06-13 PROCEDURE — 3074F SYST BP LT 130 MM HG: CPT | Performed by: FAMILY MEDICINE

## 2023-06-13 PROCEDURE — 87510 GARDNER VAG DNA DIR PROBE: CPT | Performed by: FAMILY MEDICINE

## 2023-06-13 PROCEDURE — 87660 TRICHOMONAS VAGIN DIR PROBE: CPT | Performed by: FAMILY MEDICINE

## 2023-06-13 PROCEDURE — 99395 PREV VISIT EST AGE 18-39: CPT | Performed by: FAMILY MEDICINE

## 2023-06-13 RX ORDER — CYANOCOBALAMIN (VITAMIN B-12) 1000 MCG
TABLET ORAL
COMMUNITY
Start: 2023-03-24

## 2023-06-14 ENCOUNTER — PATIENT MESSAGE (OUTPATIENT)
Dept: FAMILY MEDICINE CLINIC | Facility: CLINIC | Age: 33
End: 2023-06-14

## 2023-06-14 ENCOUNTER — HOSPITAL ENCOUNTER (OUTPATIENT)
Dept: GENERAL RADIOLOGY | Age: 33
Discharge: HOME | End: 2023-06-14
Attending: FAMILY MEDICINE
Payer: MEDICAID

## 2023-06-14 ENCOUNTER — LAB ENCOUNTER (OUTPATIENT)
Dept: LAB | Age: 33
End: 2023-06-14
Attending: FAMILY MEDICINE
Payer: MEDICAID

## 2023-06-14 DIAGNOSIS — Z13.6 SCREENING FOR CARDIOVASCULAR CONDITION: ICD-10-CM

## 2023-06-14 DIAGNOSIS — Z00.00 ANNUAL PHYSICAL EXAM: ICD-10-CM

## 2023-06-14 DIAGNOSIS — G89.29 CHRONIC MIDLINE LOW BACK PAIN WITHOUT SCIATICA: ICD-10-CM

## 2023-06-14 DIAGNOSIS — M54.50 CHRONIC MIDLINE LOW BACK PAIN WITHOUT SCIATICA: ICD-10-CM

## 2023-06-14 LAB
CHOLEST SERPL-MCNC: 158 MG/DL (ref ?–200)
FASTING PATIENT LIPID ANSWER: YES
HDLC SERPL-MCNC: 51 MG/DL (ref 40–59)
LDLC SERPL CALC-MCNC: 92 MG/DL (ref ?–100)
NONHDLC SERPL-MCNC: 107 MG/DL (ref ?–130)
TRIGL SERPL-MCNC: 78 MG/DL (ref 30–149)
VLDLC SERPL CALC-MCNC: 13 MG/DL (ref 0–30)

## 2023-06-14 PROCEDURE — 72110 X-RAY EXAM L-2 SPINE 4/>VWS: CPT | Performed by: FAMILY MEDICINE

## 2023-06-14 PROCEDURE — 36415 COLL VENOUS BLD VENIPUNCTURE: CPT

## 2023-06-14 PROCEDURE — 80061 LIPID PANEL: CPT

## 2023-06-14 RX ORDER — METRONIDAZOLE 500 MG/1
500 TABLET ORAL 2 TIMES DAILY
Qty: 14 TABLET | Refills: 0 | Status: SHIPPED | OUTPATIENT
Start: 2023-06-14 | End: 2023-06-21

## 2023-06-14 NOTE — TELEPHONE ENCOUNTER
From: Iam Ponce  To: Arvin Pineda MD  Sent: 6/14/2023 11:40 AM CDT  Subject: Question regarding VAGINITIS/VAGINOSIS, DNA PROBE    Could you explain the Gardnerella? It says positive for but under it it says negative?

## 2023-06-22 LAB
LAST PAP RESULT: NORMAL
PAP HISTORY (OTHER THAN LAST PAP): NORMAL

## 2023-06-24 ENCOUNTER — TELEPHONE (OUTPATIENT)
Dept: FAMILY MEDICINE CLINIC | Facility: CLINIC | Age: 33
End: 2023-06-24

## 2023-06-24 DIAGNOSIS — G89.29 CHRONIC MIDLINE LOW BACK PAIN WITHOUT SCIATICA: Primary | ICD-10-CM

## 2023-06-24 DIAGNOSIS — M54.50 CHRONIC MIDLINE LOW BACK PAIN WITHOUT SCIATICA: Primary | ICD-10-CM

## 2023-06-24 DIAGNOSIS — N76.0 BV (BACTERIAL VAGINOSIS): ICD-10-CM

## 2023-06-24 DIAGNOSIS — B96.89 BV (BACTERIAL VAGINOSIS): ICD-10-CM

## 2023-06-27 RX ORDER — METRONIDAZOLE 500 MG/1
500 TABLET ORAL 3 TIMES DAILY
Qty: 21 TABLET | Refills: 0 | Status: SHIPPED | OUTPATIENT
Start: 2023-06-27 | End: 2023-07-04

## 2023-07-13 ENCOUNTER — TELEPHONE (OUTPATIENT)
Dept: FAMILY MEDICINE CLINIC | Facility: CLINIC | Age: 33
End: 2023-07-13

## 2023-07-25 DIAGNOSIS — G35 MS (MULTIPLE SCLEROSIS) (HCC): ICD-10-CM

## 2023-07-25 RX ORDER — DIMETHYL FUMARATE 240 MG/1
240 CAPSULE ORAL 2 TIMES DAILY
Qty: 60 CAPSULE | Refills: 0 | Status: SHIPPED | OUTPATIENT
Start: 2023-07-25 | End: 2023-08-24

## 2023-07-25 NOTE — TELEPHONE ENCOUNTER
[employer] message sent to patient stating she will need to schedule an appointment prior to the next refill. Medication: Tecfidera     Date of last refill: 4/6/23 (#60/3)  [place]      Last office visit: 2/22/2023  Due back to clinic per last office note:  6 months  Date next office visit scheduled:    No future appointments. Last OV note recommendation:    RRMS- obtain MRI thoracic and cervical spine. Intermittent sensory symptoms- may be MS paroxysmal symptoms. MRI brain 10/2022 showed no active lesion, and there was minimal progression compared to 2019  Check Vitamin D  Start Tecfidera   Check CBC/CMP in 3 months     Intermittent left facial and arm paresthesias (and rarely the right side)- very brief. Obtain MRI cervical and thoracic. Eval for high cervical lesion.  Consider starting gabapentin or trileptal if become more bothersome     Short term memory loss- check B12 and TSH, one consideration is concentration/attention deficit related

## 2023-08-03 ENCOUNTER — PATIENT MESSAGE (OUTPATIENT)
Dept: FAMILY MEDICINE CLINIC | Facility: CLINIC | Age: 33
End: 2023-08-03

## 2023-08-03 DIAGNOSIS — R68.82 LOSS OF LIBIDO: ICD-10-CM

## 2023-08-03 DIAGNOSIS — L68.0 HIRSUTISM: Primary | ICD-10-CM

## 2023-08-04 NOTE — TELEPHONE ENCOUNTER
From: Ramiro Leach  To: Kathy Dozier MD  Sent: 8/3/2023 8:38 PM CDT  Subject: Hello    I was wondering if I could get blood work done to know my hormone levels.

## 2023-08-07 NOTE — TELEPHONE ENCOUNTER
We will get labs done first and depending on labs we can either call her or have her come in if needed but we will let her know once we have labs available.

## 2023-08-10 ENCOUNTER — LAB ENCOUNTER (OUTPATIENT)
Dept: LAB | Age: 33
End: 2023-08-10
Attending: FAMILY MEDICINE
Payer: MEDICAID

## 2023-08-10 DIAGNOSIS — R68.82 LOSS OF LIBIDO: ICD-10-CM

## 2023-08-10 DIAGNOSIS — L68.0 HIRSUTISM: ICD-10-CM

## 2023-08-10 LAB
ESTRADIOL SERPL-MCNC: 32.8 PG/ML
FSH SERPL-ACNC: 3.6 MIU/ML
LH SERPL-ACNC: 5.2 MIU/ML
PROGEST SERPL-MCNC: 0.53 NG/ML

## 2023-08-10 PROCEDURE — 84144 ASSAY OF PROGESTERONE: CPT

## 2023-08-10 PROCEDURE — 83001 ASSAY OF GONADOTROPIN (FSH): CPT

## 2023-08-10 PROCEDURE — 83002 ASSAY OF GONADOTROPIN (LH): CPT

## 2023-08-10 PROCEDURE — 82670 ASSAY OF TOTAL ESTRADIOL: CPT

## 2023-08-10 PROCEDURE — 36415 COLL VENOUS BLD VENIPUNCTURE: CPT

## 2023-08-11 ENCOUNTER — HOSPITAL ENCOUNTER (OUTPATIENT)
Dept: MAMMOGRAPHY | Facility: HOSPITAL | Age: 33
Discharge: HOME | End: 2023-08-11
Attending: FAMILY MEDICINE
Payer: MEDICAID

## 2023-08-11 DIAGNOSIS — N64.4 BREAST PAIN: ICD-10-CM

## 2023-08-11 PROCEDURE — 76642 ULTRASOUND BREAST LIMITED: CPT | Performed by: FAMILY MEDICINE

## 2023-08-11 PROCEDURE — 77066 DX MAMMO INCL CAD BI: CPT | Performed by: FAMILY MEDICINE

## 2023-08-11 PROCEDURE — 77062 BREAST TOMOSYNTHESIS BI: CPT | Performed by: FAMILY MEDICINE

## 2023-08-21 ENCOUNTER — TELEPHONE (OUTPATIENT)
Dept: FAMILY MEDICINE CLINIC | Facility: CLINIC | Age: 33
End: 2023-08-21

## 2023-08-21 NOTE — TELEPHONE ENCOUNTER
----- Message from Cheri Rene MD sent at 8/21/2023 11:37 AM CDT -----  Results reviewed. Please inform patient mammogram result are neg. If her breast pain and nipple discharge continue I recommend she follow up with me in office. Please schedule if has symptoms.

## 2023-08-31 ENCOUNTER — PATIENT MESSAGE (OUTPATIENT)
Dept: FAMILY MEDICINE CLINIC | Facility: CLINIC | Age: 33
End: 2023-08-31

## 2023-08-31 ENCOUNTER — TELEMEDICINE (OUTPATIENT)
Dept: FAMILY MEDICINE CLINIC | Facility: CLINIC | Age: 33
End: 2023-08-31
Payer: MEDICAID

## 2023-08-31 DIAGNOSIS — A08.4 VIRAL GASTROENTERITIS: Primary | ICD-10-CM

## 2023-09-01 ENCOUNTER — PATIENT MESSAGE (OUTPATIENT)
Dept: FAMILY MEDICINE CLINIC | Facility: CLINIC | Age: 33
End: 2023-09-01

## 2023-09-01 DIAGNOSIS — Z83.3 FAMILY HISTORY OF TYPE 2 DIABETES MELLITUS: Primary | ICD-10-CM

## 2023-09-01 DIAGNOSIS — G35 MS (MULTIPLE SCLEROSIS) (HCC): ICD-10-CM

## 2023-09-04 RX ORDER — DIMETHYL FUMARATE 240 MG/1
240 CAPSULE ORAL 2 TIMES DAILY
Qty: 60 CAPSULE | Refills: 2 | Status: SHIPPED | OUTPATIENT
Start: 2023-09-04

## 2023-09-28 ENCOUNTER — PATIENT MESSAGE (OUTPATIENT)
Dept: NEUROLOGY | Facility: CLINIC | Age: 33
End: 2023-09-28

## 2023-09-28 ENCOUNTER — OFFICE VISIT (OUTPATIENT)
Dept: FAMILY MEDICINE CLINIC | Facility: CLINIC | Age: 33
End: 2023-09-28
Payer: MEDICAID

## 2023-09-28 VITALS
TEMPERATURE: 97 F | DIASTOLIC BLOOD PRESSURE: 80 MMHG | HEART RATE: 65 BPM | WEIGHT: 226 LBS | OXYGEN SATURATION: 98 % | SYSTOLIC BLOOD PRESSURE: 118 MMHG | HEIGHT: 63 IN | BODY MASS INDEX: 40.04 KG/M2

## 2023-09-28 DIAGNOSIS — Z30.09 COUNSELING FOR INITIATION OF BIRTH CONTROL METHOD: Primary | ICD-10-CM

## 2023-09-28 DIAGNOSIS — R20.0 RIGHT LEG NUMBNESS: ICD-10-CM

## 2023-09-28 DIAGNOSIS — R20.2 NUMBNESS AND TINGLING IN BOTH HANDS: Primary | ICD-10-CM

## 2023-09-28 DIAGNOSIS — R20.0 NUMBNESS AND TINGLING IN BOTH HANDS: Primary | ICD-10-CM

## 2023-09-28 LAB
CONTROL LINE PRESENT WITH A CLEAR BACKGROUND (YES/NO): YES YES/NO
KIT LOT #: NORMAL NUMERIC
PREGNANCY TEST, URINE: NEGATIVE

## 2023-09-28 PROCEDURE — 3079F DIAST BP 80-89 MM HG: CPT | Performed by: FAMILY MEDICINE

## 2023-09-28 PROCEDURE — 3074F SYST BP LT 130 MM HG: CPT | Performed by: FAMILY MEDICINE

## 2023-09-28 PROCEDURE — 99214 OFFICE O/P EST MOD 30 MIN: CPT | Performed by: FAMILY MEDICINE

## 2023-09-28 PROCEDURE — 81025 URINE PREGNANCY TEST: CPT | Performed by: FAMILY MEDICINE

## 2023-09-28 PROCEDURE — 3008F BODY MASS INDEX DOCD: CPT | Performed by: FAMILY MEDICINE

## 2023-09-28 RX ORDER — MEDROXYPROGESTERONE ACETATE 150 MG/ML
150 INJECTION, SUSPENSION INTRAMUSCULAR
Qty: 1 EACH | Refills: 3 | Status: SHIPPED | OUTPATIENT
Start: 2023-09-28 | End: 2023-12-27

## 2023-10-05 ENCOUNTER — OFFICE VISIT (OUTPATIENT)
Dept: FAMILY MEDICINE CLINIC | Facility: CLINIC | Age: 33
End: 2023-10-05
Payer: MEDICAID

## 2023-10-05 VITALS
SYSTOLIC BLOOD PRESSURE: 126 MMHG | HEART RATE: 88 BPM | DIASTOLIC BLOOD PRESSURE: 80 MMHG | OXYGEN SATURATION: 100 % | TEMPERATURE: 98 F

## 2023-10-05 DIAGNOSIS — R11.2 NAUSEA AND VOMITING, UNSPECIFIED VOMITING TYPE: Primary | ICD-10-CM

## 2023-10-05 DIAGNOSIS — Z30.013 INITIATION OF DEPO PROVERA: ICD-10-CM

## 2023-10-05 PROCEDURE — 3074F SYST BP LT 130 MM HG: CPT | Performed by: NURSE PRACTITIONER

## 2023-10-05 PROCEDURE — 81025 URINE PREGNANCY TEST: CPT | Performed by: NURSE PRACTITIONER

## 2023-10-05 PROCEDURE — 99213 OFFICE O/P EST LOW 20 MIN: CPT | Performed by: NURSE PRACTITIONER

## 2023-10-05 PROCEDURE — 3079F DIAST BP 80-89 MM HG: CPT | Performed by: NURSE PRACTITIONER

## 2023-10-05 RX ORDER — ONDANSETRON 4 MG/1
4 TABLET, ORALLY DISINTEGRATING ORAL EVERY 8 HOURS PRN
Qty: 10 TABLET | Refills: 0 | Status: SHIPPED | OUTPATIENT
Start: 2023-10-05

## 2023-10-13 ENCOUNTER — NURSE ONLY (OUTPATIENT)
Dept: FAMILY MEDICINE CLINIC | Facility: CLINIC | Age: 33
End: 2023-10-13
Payer: MEDICAID

## 2023-10-13 DIAGNOSIS — Z30.42 ENCOUNTER FOR SURVEILLANCE OF INJECTABLE CONTRACEPTIVE: Primary | ICD-10-CM

## 2023-10-13 PROCEDURE — 81025 URINE PREGNANCY TEST: CPT | Performed by: NURSE PRACTITIONER

## 2023-10-13 PROCEDURE — 96372 THER/PROPH/DIAG INJ SC/IM: CPT | Performed by: NURSE PRACTITIONER

## 2023-10-13 RX ORDER — MEDROXYPROGESTERONE ACETATE 150 MG/ML
150 INJECTION, SUSPENSION INTRAMUSCULAR ONCE
Status: COMPLETED | OUTPATIENT
Start: 2023-10-13 | End: 2023-10-13

## 2023-10-13 RX ADMIN — MEDROXYPROGESTERONE ACETATE 150 MG: 150 INJECTION, SUSPENSION INTRAMUSCULAR at 15:10:00

## 2023-10-13 NOTE — PROGRESS NOTES
Urine preg test and depo injection. Urine preg test negative  Depo injection given in left ventro-gluteal   Patient tolerated well and left in stable condition.   Next depo due jan 5th -12th

## 2023-10-21 ENCOUNTER — PATIENT MESSAGE (OUTPATIENT)
Dept: FAMILY MEDICINE CLINIC | Facility: CLINIC | Age: 33
End: 2023-10-21

## 2023-10-21 ENCOUNTER — HOSPITAL ENCOUNTER (OUTPATIENT)
Age: 33
Discharge: HOME | End: 2023-10-21
Payer: MEDICAID

## 2023-10-21 VITALS
SYSTOLIC BLOOD PRESSURE: 122 MMHG | OXYGEN SATURATION: 97 % | DIASTOLIC BLOOD PRESSURE: 59 MMHG | TEMPERATURE: 98 F | RESPIRATION RATE: 18 BRPM | HEART RATE: 75 BPM

## 2023-10-21 DIAGNOSIS — R50.9 FEVER, UNSPECIFIED FEVER CAUSE: Primary | ICD-10-CM

## 2023-10-21 LAB
B-HCG UR QL: NEGATIVE
BILIRUB UR QL STRIP: NEGATIVE
CLARITY UR: CLEAR
GLUCOSE UR STRIP-MCNC: NEGATIVE MG/DL
KETONES UR STRIP-MCNC: NEGATIVE MG/DL
LEUKOCYTE ESTERASE UR QL STRIP: NEGATIVE
NITRITE UR QL STRIP: NEGATIVE
PH UR STRIP: 6 [PH]
PROT UR STRIP-MCNC: NEGATIVE MG/DL
SARS-COV-2 RNA RESP QL NAA+PROBE: NOT DETECTED
SP GR UR STRIP: >=1.03
UROBILINOGEN UR STRIP-ACNC: <2 MG/DL

## 2023-10-21 PROCEDURE — 81025 URINE PREGNANCY TEST: CPT | Performed by: NURSE PRACTITIONER

## 2023-10-21 PROCEDURE — 99213 OFFICE O/P EST LOW 20 MIN: CPT | Performed by: NURSE PRACTITIONER

## 2023-10-21 PROCEDURE — U0002 COVID-19 LAB TEST NON-CDC: HCPCS | Performed by: NURSE PRACTITIONER

## 2023-10-21 PROCEDURE — 81002 URINALYSIS NONAUTO W/O SCOPE: CPT | Performed by: NURSE PRACTITIONER

## 2023-10-21 NOTE — ED INITIAL ASSESSMENT (HPI)
Pt with fever for the last two days, responds to tylenol/ibuprofen. Pt has two children at home sick with Covid.

## 2023-10-21 NOTE — TELEPHONE ENCOUNTER
From: Lenda Riedel  To: Garrett Rodríguez  Sent: 10/21/2023 8:37 AM CDT  Subject: Tiffanie Alt    My kids have Covid and yesterday I caught a fever that continued onto today and had to call off of work. Was wondering if I could schedule an appt today to get tested to see if I have it and get a dr note for missing work?

## 2023-10-24 ENCOUNTER — HOSPITAL ENCOUNTER (OUTPATIENT)
Dept: MRI IMAGING | Facility: HOSPITAL | Age: 33
Discharge: HOME | End: 2023-10-24
Attending: Other

## 2023-10-24 DIAGNOSIS — R20.2 NUMBNESS AND TINGLING IN BOTH HANDS: ICD-10-CM

## 2023-10-24 DIAGNOSIS — R20.0 NUMBNESS AND TINGLING IN BOTH HANDS: ICD-10-CM

## 2023-11-08 ENCOUNTER — OFFICE VISIT (OUTPATIENT)
Dept: NEUROLOGY | Facility: CLINIC | Age: 33
End: 2023-11-08
Payer: MEDICAID

## 2023-11-08 VITALS — SYSTOLIC BLOOD PRESSURE: 112 MMHG | HEART RATE: 70 BPM | DIASTOLIC BLOOD PRESSURE: 74 MMHG | RESPIRATION RATE: 16 BRPM

## 2023-11-08 DIAGNOSIS — E53.8 VITAMIN B12 DEFICIENCY: ICD-10-CM

## 2023-11-08 DIAGNOSIS — R20.0 RIGHT LEG NUMBNESS: ICD-10-CM

## 2023-11-08 DIAGNOSIS — R20.0 NUMBNESS AND TINGLING IN BOTH HANDS: ICD-10-CM

## 2023-11-08 DIAGNOSIS — R20.2 NUMBNESS AND TINGLING IN BOTH HANDS: ICD-10-CM

## 2023-11-08 DIAGNOSIS — R41.3 SHORT-TERM MEMORY LOSS: ICD-10-CM

## 2023-11-08 DIAGNOSIS — G35 MS (MULTIPLE SCLEROSIS) (HCC): Primary | ICD-10-CM

## 2023-11-08 PROCEDURE — 99214 OFFICE O/P EST MOD 30 MIN: CPT | Performed by: OTHER

## 2023-11-08 PROCEDURE — 3074F SYST BP LT 130 MM HG: CPT | Performed by: OTHER

## 2023-11-08 PROCEDURE — 3078F DIAST BP <80 MM HG: CPT | Performed by: OTHER

## 2023-11-08 NOTE — PROGRESS NOTES
[facility]    Neurology    Horacio Nash Patient Status:  No patient class for patient encounter    1990 MRN [id number]   Location [facility], [address] [place] [address] PCP Sudha Fuchs MD              HPI:   Horacio Nash is a(n) 34year old female who presents for evaluation of MS. She was seen over 3 years ago, and was started on Tecfidera in . She moved to [place], and now has moved back. She is a . She ran out of Tecfidera, and because of her phone changing, the company wasn't able to contact her to restart it. She came off Tecfidera 2022. In 2019 she had L arm heaviness. Prior to Tecfidera, she was seen in the ED and MRI of the brain showed two ovoid enhancing lesions. She reports occasionally she had once a visual distortion. Since 2022, she gets tingling on the left face and arm, it will last 3-5 seconds, occurs 5-6 times per day. When she is stressed, it will last until she calms down. Also get LH with these brief paresthesias, as well as left eye blurriness. Feels like words slur as well. In 10/2022 she was seen in the ED, and was given a medrol dose pack. The episodes became less frequent after the medrol dose pack. Feels like memory is worse. Patient here to follow up regarding MS. States that numbness in hands, right leg on/off. Top of foot on right and left toe since beginning of October. Vision blurred in 2023 episode, did have fall but on to couch. Numbness similar to when first diagnosed with MS. Started in 2023. Tingling sensation down arms and into fingertips, lasting 1-2 minutes. Now happening 1-2 times a week. Was also getting sharp pains radiating from her R hip down her leg, to the foot. This now is also less frequent, lasting around 5 minutes. Now has a constant pain in the dorsum of her right foot, worse when puts pressure on it. Intermittent has burning left toe pain when walking.  Is still a . Had an instance of getting up too fast, got LH and developed tunnel vision, sat back down and improved. Tecfidera- started 10/2019, stopped in 5/2022, then restart again Spring 2023    Pertinent imaging and laboratory work-up:   XR lumbar spine 6/2023  CONCLUSION:    1. Mild degenerative change without significant change when compared to most recent lumbar spine radiographs dated 10/2/2019. MRI cervical 3/2023  CONCLUSION:       1. No evidence of demyelinating lesion or abnormal enhancement within the cervical cord. 2. Mild degenerative changes of the cervical spine from C4-5 to C6-7 are stable. MRI thoracic 3/2023  CONCLUSION:       1. No evidence of demyelinating lesions within the thoracic cord. 2. Stable mild degenerative changes of the thoracic spine at T6-7 and T11-12. Component      Latest Ref Rng 3/10/2023   Vitamin B12      200 - 1,100 pg/mL 339    TSH      mIU/L 1.34          MRI brain 10/2022  CONCLUSION:  Scattered areas of FLAIR signal hyperintensity are present within the white matter with a few new areas when compared with the prior exam of 1/31/2020 such as within the left periventricular white matter, adjacent to the left lateral aspect   of the fourth ventricle, and within the right parietal subcortical white matter. None of these areas demonstrates enhancement or restricted diffusion to suggest acute demyelination. This is most compatible with the reported history of demyelinating   disease. MRI brain/MRA ww/o contrast 7/30/19  CONCLUSION:    1. There are 2 ovoid lesions of high T2/FLAIR signal within the right hemisphere involving the periventricular white matter with associated mild restriction of diffusion as well as mild post-contrast enhancement. The appearance of the lesions are most   consistent with regions of demyelination/MS. Please correlate clinically. 2. Unremarkable MR angiography of the head and neck.      MRI cervical 8/2019  CONCLUSION:       1. No focal spinal cord signal abnormality identified. No abnormal enhancement in the cervical spine. 2. Mild degenerative changes in the cervical spine as above without significant spinal canal or neural foraminal stenosis at any level. MRI thoracic 8/2019  CONCLUSION:       1. No focal spinal cord signal abnormality identified. No abnormal enhancement. 2. Mild degenerative changes in the thoracic spine as above without significant spinal canal or neural foraminal stenosis at any level. Component      Latest Ref Rng & Units 8/12/2019   TOTAL VOLUME CSF      mL 8.0   CSF TUBE #       4   Color CSF      Colorless Colorless   Clarity CSF      Clear Clear   WBC CSF      0 - 5 /mm3 17 (H)   RBC CSF      /mm3 6   CSF BAND OLIGOCLONAL       Positive (A)   CSF OLIGOCLONAL BANDS NUMBER      0 - 1 Bands 7 (H)   CSF OLIG INTERPRETATION       See Note   HEMOGLOBIN A1c      <5.7 % 4.8   ESTIMATED AVERAGE GLUCOSE      68 - 126 mg/dL 91   Lyme Screen IgG and IgM      Negative Negative   ANNIE SCREEN      Negative Negative   Total Protein CSF      15.0 - 45.0 mg/dL 29.5   Glucose CSF      40 - 70 mg/dL 49            Past Medical History:  Past Medical History:   Diagnosis Date    Disorder of thyroid     THYROMEGALLY    MS (multiple sclerosis) ([facility]     Seizure ([facility]     Seizure disorder ([facility]     as a child    Visual impairment     GLASSES        Past Surgical History:  Past Surgical History:   Procedure Laterality Date    APPENDECTOMY  02/14/2018    APPENDECTOMY      REMOVAL GALLBLADDER      2021    TONSILLECTOMY         Family History:  family history includes Breast Cancer (age of onset: 27) in her maternal cousin female and paternal aunt; Breast Cancer (age of onset: 36) in her maternal aunt; Diabetes in her father and paternal grandfather; Hypertension in her father. Social History:   reports that she has never smoked.  She has never used smokeless tobacco. She reports current alcohol use. She reports current drug use. Drug: Cannabis. Allergies:  No Known Allergies    MEDICATIONS:    Current Outpatient Medications:     ondansetron 4 MG Oral Tablet Dispersible, Take 1 tablet (4 mg total) by mouth every 8 (eight) hours as needed for Nausea., Disp: 10 tablet, Rfl: 0    medroxyPROGESTERone Acetate (DEPO-PROVERA) 150 MG/ML Intramuscular Suspension Prefilled Syringe, Inject 150 mg into the muscle every 3 (three) months., Disp: 1 each, Rfl: 3    TECFIDERA 240 MG Oral Capsule Delayed Release, Take 1 capsule (240 mg total) by mouth in the morning and 1 capsule (240 mg total) before bedtime. , Disp: 60 capsule, Rfl: 2    Cholecalciferol (VITAMIN D3) 50 MCG (2000 UT) Oral Chew Tab, , Disp: , Rfl:     Cyanocobalamin (B-12) 1000 MCG Oral Tab, , Disp: , Rfl:     topiramate 25 MG Oral Tab, Week 1: 1 tab at night, Week 2: 2 tab nighlty, Week 3: 3 tabs nightly, Week 4: if tolerating, and if headaches persist, increase to 4 tabs nightly. Alternatively, if headaches improve at lower dose, or if have side effects with higher dose, can decrease to lower prior dose. (Patient not taking: Reported on 9/28/2023), Disp: 120 tablet, Rfl: 1      Review of Systems:   A comprehensive 10 point review of systems was completed. Pertinent positives and negatives noted in the HPI. PHYSICAL EXAM:   Neurologic Exam  Vitals  Vitals:    11/08/23 1451   BP: 112/74   Pulse: 70   Resp: 16     General Appearance: Patient is a 34year old female in no acute distress  Cardiac: Normal rate & regular rhythm  Skin: There are no rashes or other skin lesions. Musculoskeletal: There is no scoliosis, or joint deformities  Neurologic examination:  Mental status: Patient is alert, attentive, and oriented x 3. Language is coherent and fluent without aphasia. Memory, comprehension and ability to follow commands were intact. Cranial nerves II-XII: Optic discs were sharp. Pupils were round and reacted to light.  Extraocular movements were full. There was no face, jaw, palate or tongue weakness or atrophy. Facial sensation was normal. Hearing was grossly intact. Shoulder shrug was normal.   Motor exam revealed normal muscle bulk and tone. No atrophy or fasciculations. Manual muscle testing revealed MRC grade 5/5 strength throughout including proximal and distal muscles of the arms and legs. Deep tendon reflexes were 2 at the biceps, brachioradialis, triceps, knee jerk, and ankle jerk. Plantar responses were flexor bilaterally. 1 beat of clonus bilat  Sensory exam revealed normal light touch perception. Vibratory perception 17 sec bilat and proprioception were intact at the toes. Pinprick and temperature were normal in LE's, hyperesthetic on the right dorsum. Romberg sign was absent. Complex motor skills revealed normal coordination. Finger-nose-finger intact. Gait was narrow and stable, was able to walk on heels, toes and tandem without any difficulty. ASSESSMENT/ACTIVE PROBLEM LIST:     Encounter Diagnoses   Name Primary? MS (multiple sclerosis) ([facility]) Yes    Right leg numbness     Numbness and tingling in both hands     Short-term memory loss     Vitamin B12 deficiency        Discussion/Plan:  Asymmetric lower extremity paresthesias- constant radiating RLE paresthesias and intermittent L toe pain with walking  MRI c and t spine  If negative, EMG, rule out right superficial peroneal neuropathy on the right causing dorsum hyperesthesia, and eval for R L 5 lumbar radiculopathy. If EMG of LE neg, will recommend MRI L spine    Intermittent paresthesias in the hands- very occasionally radiating from the shoulders. CTS v less likely radic based on prior c spine imaging, obtain MRI c spine to rule out MS plaque and EMG    RRMS- obtain MRI thoracic and cervical spine. Intermittent sensory symptoms- may be MS paroxysmal symptoms.  MRI brain 10/2022 overall showed no active lesion, and there was minimal progression compared to 2019  Check Vitamin D  Continue Tecfidera   Check CBC/CMP and then every 6 months    Short term memory loss- B12 low normal, start B12 500mcg daily oral, one consideration is concentration/attention deficit related    Requested Prescriptions      No prescriptions requested or ordered in this encounter          We discussed in depth regarding the diagnosis, prognosis, treatment. The patient was given ample opportunity to ask questions. All questions and concerns were addressed.      Alisa Kwok DO  Neuromuscular and General Neurology  [facility]

## 2023-11-08 NOTE — PROGRESS NOTES
Patient here to follow up regarding MS. States that numbness in hands, right leg on/off. Top of foot on right and left toe since beginning of October. Vision blurred in 4/2023 episode, did have fall but on to couch.

## 2023-11-09 ENCOUNTER — HOSPITAL ENCOUNTER (OUTPATIENT)
Dept: MRI IMAGING | Age: 33
Discharge: HOME | End: 2023-11-09
Attending: Other
Payer: MEDICAID

## 2023-11-09 DIAGNOSIS — R20.0 RIGHT LEG NUMBNESS: ICD-10-CM

## 2023-11-09 PROCEDURE — A9575 INJ GADOTERATE MEGLUMI 0.1ML: HCPCS | Performed by: OTHER

## 2023-11-09 PROCEDURE — 72157 MRI CHEST SPINE W/O & W/DYE: CPT | Performed by: OTHER

## 2023-11-09 RX ORDER — GADOTERATE MEGLUMINE 376.9 MG/ML
20 INJECTION INTRAVENOUS
Status: COMPLETED | OUTPATIENT
Start: 2023-11-09 | End: 2023-11-09

## 2023-11-09 RX ADMIN — GADOTERATE MEGLUMINE 20 ML: 376.9 INJECTION INTRAVENOUS at 09:34:00

## 2023-11-21 DIAGNOSIS — G35 MS (MULTIPLE SCLEROSIS) (HCC): ICD-10-CM

## 2023-11-21 NOTE — TELEPHONE ENCOUNTER
Medication: TECFIDERA 240 MG Oral Capsule Delayed Release      Date of last refill: 09/04/2023 (#60/2)  [place]     Last office visit: 11/08/2023  Due back to clinic per last office note:  Around 03/08/2024  Date next office visit scheduled:    Future Appointments   Date Time Provider Earnest [facility]   11/24/2023  8:00 PM [facility] MR RM4 (3T WIDE) [facility] MRI [facility]   3/20/2024  2:35 PM Kim Ochoa, DO [facility] [place]           Last OV note recommendation:    Discussion/Plan:  Asymmetric lower extremity paresthesias- constant radiating RLE paresthesias and intermittent L toe pain with walking  MRI c and t spine  If negative, EMG, rule out right superficial peroneal neuropathy on the right causing dorsum hyperesthesia, and eval for R L 5 lumbar radiculopathy. If EMG of LE neg, will recommend MRI L spine     Intermittent paresthesias in the hands- very occasionally radiating from the shoulders. CTS v less likely radic based on prior c spine imaging, obtain MRI c spine to rule out MS plaque and EMG     RRMS- obtain MRI thoracic and cervical spine. Intermittent sensory symptoms- may be MS paroxysmal symptoms.  MRI brain 10/2022 overall showed no active lesion, and there was minimal progression compared to 2019  Check Vitamin D  Continue Tecfidera   Check CBC/CMP and then every 6 months     Short term memory loss- B12 low normal, start B12 500mcg daily oral, one consideration is concentration/attention deficit related

## 2023-11-22 RX ORDER — DIMETHYL FUMARATE 240 MG/1
CAPSULE ORAL
Qty: 60 CAPSULE | Refills: 2 | Status: SHIPPED | OUTPATIENT
Start: 2023-11-22

## 2023-11-24 ENCOUNTER — HOSPITAL ENCOUNTER (OUTPATIENT)
Dept: MRI IMAGING | Facility: HOSPITAL | Age: 33
Discharge: HOME | End: 2023-11-24
Attending: Other
Payer: MEDICAID

## 2023-11-24 PROCEDURE — 72156 MRI NECK SPINE W/O & W/DYE: CPT | Performed by: OTHER

## 2023-11-24 PROCEDURE — A9575 INJ GADOTERATE MEGLUMI 0.1ML: HCPCS | Performed by: OTHER

## 2023-11-24 RX ORDER — GADOTERATE MEGLUMINE 376.9 MG/ML
20 INJECTION INTRAVENOUS
Status: COMPLETED | OUTPATIENT
Start: 2023-11-24 | End: 2023-11-24

## 2023-11-24 RX ADMIN — GADOTERATE MEGLUMINE 20 ML: 376.9 INJECTION INTRAVENOUS at 21:03:00

## 2023-12-02 ENCOUNTER — HOSPITAL ENCOUNTER (OUTPATIENT)
Age: 33
Discharge: HOME | End: 2023-12-02
Payer: MEDICAID

## 2023-12-02 VITALS
OXYGEN SATURATION: 95 % | HEART RATE: 84 BPM | RESPIRATION RATE: 16 BRPM | TEMPERATURE: 98 F | SYSTOLIC BLOOD PRESSURE: 138 MMHG | DIASTOLIC BLOOD PRESSURE: 69 MMHG

## 2023-12-02 DIAGNOSIS — B34.9 VIRAL SYNDROME: Primary | ICD-10-CM

## 2023-12-02 DIAGNOSIS — J02.9 SORE THROAT: ICD-10-CM

## 2023-12-02 LAB
POCT INFLUENZA A: NEGATIVE
POCT INFLUENZA B: NEGATIVE
S PYO AG THROAT QL: NEGATIVE
SARS-COV-2 RNA RESP QL NAA+PROBE: NOT DETECTED

## 2023-12-02 NOTE — ED INITIAL ASSESSMENT (HPI)
Sore throat for a few days. Pt woke up today with fever, aches and chills. Tmax 102. Strep and flu exposure.

## 2023-12-02 NOTE — DISCHARGE INSTRUCTIONS
Interventions to help decrease congestion: Over the counter Flonase, hot steam showers, saline irrigation such as the [employer], Over the counter Zyrtec, Claritin, or Allegra. Interventions for sore throat: Warm salt water gargles 3 times daily. Take a teaspoon of honey on its own or  in another liquid like juice or tea. Cough or throat drops. Drink plenty of fluids, mainly consisting of water. Follow-up with your doctor in 2 to 3 days. Go to the nearest ER for new or worsening symptoms.

## 2024-01-02 ENCOUNTER — OFFICE VISIT (OUTPATIENT)
Dept: ELECTROPHYSIOLOGY | Facility: HOSPITAL | Age: 34
End: 2024-01-02
Attending: Other
Payer: MEDICAID

## 2024-01-02 DIAGNOSIS — M54.16 LUMBAR RADICULOPATHY: Primary | ICD-10-CM

## 2024-01-02 DIAGNOSIS — R20.0 RIGHT LEG NUMBNESS: ICD-10-CM

## 2024-01-02 DIAGNOSIS — R20.0 NUMBNESS AND TINGLING IN BOTH HANDS: ICD-10-CM

## 2024-01-02 DIAGNOSIS — R20.2 NUMBNESS AND TINGLING IN BOTH HANDS: ICD-10-CM

## 2024-01-02 PROCEDURE — 95886 MUSC TEST DONE W/N TEST COMP: CPT | Performed by: OTHER

## 2024-01-02 PROCEDURE — 95913 NRV CNDJ TEST 13/> STUDIES: CPT | Performed by: OTHER

## 2024-01-02 NOTE — PROCEDURES
[facility]  Nerve Conduction & EMG Report                      Shalonda Ochoa D.O.  [facility]   EMG department   [address] [place] [id number]  [phone]          Patient name: Negar Gaona  YOB: 1990  Referring provider: Dr. Ochoa  Reason for study: Eval for CTS, radiculopathy or upper or lower extremity  Brief history: 33 year old female with several month history of intermittent hand paresthesias, that radiate from the shoulder or forearm down to the fifth digit but then moved to the lateral fingers as well.  Whole hand then goes numb.  She also has intermittent right foot numbness, dorsum of the foot.      Sensory and motor nerve conduction studies, and needle EMG:  Please see separate sheet for waveforms and quantitative nerve conduction data, as well as for tabulated form of electromyographic data.      Summary:   1.  The right median sensory response is normal.  However, when a right median to ulnar palmar comparison sensory study was performed, there was a difference in latency of 0.5 ms, with the median response being prolonged.  2.  The left median sensory response was normal.  3.  The bilateral ulnar and right radial sensory responses were normal.  4.  The bilateral sural sensory responses were normal.  5.  The bilateral median motor responses were normal.  6.  The bilateral ulnar motor responses were normal.  7.  The bilateral tibial motor responses were normal.  8.  The right peroneal motor response was normal.  9.  Needle EMG using a concentric needle was performed on selected muscles of the right upper and lower extremities, as well as the right lumbar paraspinal muscles.  There was increased insertional activity seen in the right paraspinal muscles.  There was occasional fasciculations and mild motor unit remodeling seen in the right gastrocnemius.    Conclusion:  1.  There is evidence of a chronic right S1 lumbosacral  radiculopathy.  2.  There is evidence of a very mild right median compressive mononeuropathy at the wrist.  3.  There is no evidence of a right cervical radiculopathy.  4.  There is no evidence of a polyneuropathy.      Shalonda Ochoa, DO  Neurology and Neuromuscular medicine  [facility]

## 2024-02-01 ENCOUNTER — PATIENT MESSAGE (OUTPATIENT)
Dept: FAMILY MEDICINE CLINIC | Facility: CLINIC | Age: 34
End: 2024-02-01

## 2024-02-02 NOTE — TELEPHONE ENCOUNTER
From: Negar Gaona  To: Alize Moeller  Sent: 2/1/2024 6:10 PM CST  Subject: Hello    I have some labs due for Dr Moeller and Dr. Ochoa. From what I can see on the chart. So I was wondering can I just walk in to get them all done tomorrow?

## 2024-02-06 ENCOUNTER — PATIENT MESSAGE (OUTPATIENT)
Dept: NEUROLOGY | Facility: CLINIC | Age: 34
End: 2024-02-06

## 2024-02-06 NOTE — TELEPHONE ENCOUNTER
From: Negar Gaona  To: Nicky Ochoa  Sent: 2/6/2024 2:37 PM CST  Subject: Hello    You told me to try physical therapy again for my back and I wanted to know if you had a list of therapists I can schedule with around [place]?

## 2024-02-27 ENCOUNTER — TELEPHONE (OUTPATIENT)
Dept: FAMILY MEDICINE CLINIC | Facility: CLINIC | Age: 34
End: 2024-02-27

## 2024-02-27 NOTE — TELEPHONE ENCOUNTER
Patient scheduled OV with Lea for her depo  She is overdue for depo  Last given 10/5/23  Does she even need OV if she doesn't have anything else to discuss?  Also since she is over due urine preg needed

## 2024-02-27 NOTE — TELEPHONE ENCOUNTER
Pt scheduled Depo shot with Lea. Does she need to be seen or nurse visit.   Future Appointments   Date Time Provider Department Center   3/15/2024 10:20 AM Devorah Romano APRN [facility] [place]   3/20/2024  2:35 PM Nicky Ochoa DO [facility] [place]

## 2024-02-29 ENCOUNTER — LAB ENCOUNTER (OUTPATIENT)
Dept: LAB | Age: 34
End: 2024-02-29
Attending: FAMILY MEDICINE
Payer: MEDICAID

## 2024-02-29 DIAGNOSIS — G35 MS (MULTIPLE SCLEROSIS) (HCC): ICD-10-CM

## 2024-02-29 DIAGNOSIS — Z83.3 FAMILY HISTORY OF TYPE 2 DIABETES MELLITUS: ICD-10-CM

## 2024-02-29 DIAGNOSIS — R20.0 RIGHT LEG NUMBNESS: ICD-10-CM

## 2024-02-29 DIAGNOSIS — R20.0 NUMBNESS AND TINGLING IN BOTH HANDS: ICD-10-CM

## 2024-02-29 DIAGNOSIS — R20.2 NUMBNESS AND TINGLING IN BOTH HANDS: ICD-10-CM

## 2024-02-29 LAB
ALBUMIN SERPL-MCNC: 3.8 G/DL (ref 3.4–5)
ALBUMIN/GLOB SERPL: 0.9 {RATIO} (ref 1–2)
ALP LIVER SERPL-CCNC: 78 U/L
ALT SERPL-CCNC: 27 U/L
ANION GAP SERPL CALC-SCNC: 4 MMOL/L (ref 0–18)
AST SERPL-CCNC: 19 U/L (ref 15–37)
BASOPHILS # BLD AUTO: 0.05 X10(3) UL (ref 0–0.2)
BASOPHILS NFR BLD AUTO: 0.8 %
BILIRUB SERPL-MCNC: 0.5 MG/DL (ref 0.1–2)
BUN BLD-MCNC: 11 MG/DL (ref 9–23)
CALCIUM BLD-MCNC: 9.3 MG/DL (ref 8.5–10.1)
CHLORIDE SERPL-SCNC: 108 MMOL/L (ref 98–112)
CO2 SERPL-SCNC: 24 MMOL/L (ref 21–32)
CREAT BLD-MCNC: 0.88 MG/DL
EGFRCR SERPLBLD CKD-EPI 2021: 89 ML/MIN/1.73M2 (ref 60–?)
EOSINOPHIL # BLD AUTO: 0.07 X10(3) UL (ref 0–0.7)
EOSINOPHIL NFR BLD AUTO: 1.1 %
ERYTHROCYTE [DISTWIDTH] IN BLOOD BY AUTOMATED COUNT: 12 %
EST. AVERAGE GLUCOSE BLD GHB EST-MCNC: 94 MG/DL (ref 68–126)
FASTING STATUS PATIENT QL REPORTED: YES
GLOBULIN PLAS-MCNC: 4.3 G/DL (ref 2.8–4.4)
GLUCOSE BLD-MCNC: 96 MG/DL (ref 70–99)
HBA1C MFR BLD: 4.9 % (ref ?–5.7)
HCT VFR BLD AUTO: 41.4 %
HGB BLD-MCNC: 13.4 G/DL
IMM GRANULOCYTES # BLD AUTO: 0.02 X10(3) UL (ref 0–1)
IMM GRANULOCYTES NFR BLD: 0.3 %
LYMPHOCYTES # BLD AUTO: 1.4 X10(3) UL (ref 1–4)
LYMPHOCYTES NFR BLD AUTO: 22.4 %
MCH RBC QN AUTO: 30 PG (ref 26–34)
MCHC RBC AUTO-ENTMCNC: 32.4 G/DL (ref 31–37)
MCV RBC AUTO: 92.8 FL
MONOCYTES # BLD AUTO: 0.52 X10(3) UL (ref 0.1–1)
MONOCYTES NFR BLD AUTO: 8.3 %
NEUTROPHILS # BLD AUTO: 4.19 X10 (3) UL (ref 1.5–7.7)
NEUTROPHILS # BLD AUTO: 4.19 X10(3) UL (ref 1.5–7.7)
NEUTROPHILS NFR BLD AUTO: 67.1 %
OSMOLALITY SERPL CALC.SUM OF ELEC: 281 MOSM/KG (ref 275–295)
PLATELET # BLD AUTO: 285 10(3)UL (ref 150–450)
POTASSIUM SERPL-SCNC: 4.1 MMOL/L (ref 3.5–5.1)
PROT SERPL-MCNC: 8.1 G/DL (ref 6.4–8.2)
RBC # BLD AUTO: 4.46 X10(6)UL
SODIUM SERPL-SCNC: 136 MMOL/L (ref 136–145)
VIT D+METAB SERPL-MCNC: 22.8 NG/ML (ref 30–100)
WBC # BLD AUTO: 6.3 X10(3) UL (ref 4–11)

## 2024-02-29 PROCEDURE — 82306 VITAMIN D 25 HYDROXY: CPT

## 2024-02-29 PROCEDURE — 80053 COMPREHEN METABOLIC PANEL: CPT

## 2024-02-29 PROCEDURE — 83036 HEMOGLOBIN GLYCOSYLATED A1C: CPT

## 2024-02-29 PROCEDURE — 36415 COLL VENOUS BLD VENIPUNCTURE: CPT

## 2024-02-29 PROCEDURE — 85025 COMPLETE CBC W/AUTO DIFF WBC: CPT

## 2024-03-09 ENCOUNTER — PATIENT MESSAGE (OUTPATIENT)
Dept: NEUROLOGY | Facility: CLINIC | Age: 34
End: 2024-03-09

## 2024-03-11 ENCOUNTER — TELEPHONE (OUTPATIENT)
Dept: NEUROLOGY | Facility: CLINIC | Age: 34
End: 2024-03-11

## 2024-03-11 NOTE — TELEPHONE ENCOUNTER
Received PA request for TECFIDERA 240 mg cap. Request is for brand.  Started PA in EPIC received following message back.    Was told by Elena at [facility] that a PA is needed for brand or generic. She is faxing over necessary form to [phone]     View all authorizations for this medication   Closed   3/11/2024 10:47 AM  Case ID: [id number] Close reason: Prescription within prescribing limits. Prior Authorization not required.   Note from payer: No PA required, Prescription is within prescribing limits.   Payer: [employer] [facility]    [phone]    [phone]      Waiting for Payer Response   3/11/2024 10:47 AM  Sending user: Nicole Celestin RN   Payer: [facility]    [phone]    [phone]

## 2024-03-11 NOTE — TELEPHONE ENCOUNTER
Received PA from [facility] for TECFIDERA 240mg. Completed form and office notes dated 11/8/23 faxed back to [facility] [phone]. with confirmation.

## 2024-03-11 NOTE — TELEPHONE ENCOUNTER
From: Negar Gaona  To: Nicky Ochoa  Sent: 3/9/2024 9:07 PM CST  Subject: Hello    I just wanted to update you on some back pain I had yesterday that caused me to go to hospital. It was my lower back and it was shooting pain down my left leg to my knee. The hospital gave me norco for now to help with the pain.

## 2024-03-12 ENCOUNTER — TELEPHONE (OUTPATIENT)
Dept: NEUROLOGY | Facility: CLINIC | Age: 34
End: 2024-03-12

## 2024-03-12 RX ORDER — METHYLPREDNISOLONE 4 MG/1
TABLET ORAL
Qty: 1 EACH | Refills: 0 | Status: SHIPPED | OUTPATIENT
Start: 2024-03-12

## 2024-03-12 NOTE — TELEPHONE ENCOUNTER
Received form for prescription benefits from [place] [employer]. Forwarded to nursing.

## 2024-03-14 ENCOUNTER — HOSPITAL ENCOUNTER (OUTPATIENT)
Age: 34
Discharge: HOME | End: 2024-03-14
Payer: MEDICAID

## 2024-03-14 VITALS
BODY MASS INDEX: 42.17 KG/M2 | HEIGHT: 63 IN | HEART RATE: 66 BPM | OXYGEN SATURATION: 98 % | SYSTOLIC BLOOD PRESSURE: 127 MMHG | WEIGHT: 238 LBS | RESPIRATION RATE: 18 BRPM | TEMPERATURE: 98 F | DIASTOLIC BLOOD PRESSURE: 78 MMHG

## 2024-03-14 DIAGNOSIS — R68.89 FLU-LIKE SYMPTOMS: ICD-10-CM

## 2024-03-14 DIAGNOSIS — R09.82 PND (POST-NASAL DRIP): Primary | ICD-10-CM

## 2024-03-14 LAB — S PYO AG THROAT QL: NEGATIVE

## 2024-03-14 PROCEDURE — 99213 OFFICE O/P EST LOW 20 MIN: CPT | Performed by: NURSE PRACTITIONER

## 2024-03-14 PROCEDURE — 87880 STREP A ASSAY W/OPTIC: CPT | Performed by: NURSE PRACTITIONER

## 2024-03-14 NOTE — ED PROVIDER NOTES
Patient Seen in: [facility]      History     Chief Complaint   Patient presents with    Sore Throat     Stated Complaint: sore throat , fever    Subjective:   HPI  34-year-old female presents to the IC with sore throat fever body aches chills congestion for 3 days.  Low-grade fevers at home.  No sick contacts that she is aware.  Patient is no issues with concerns.  The patient's medication list, past medical history and social history elements as listed in today's nurse's notes were reviewed and agreed (except as otherwise stated in the HPI).  The patient's family history reviewed and determined to be noncontributory to the presenting problem.      Objective:   Past Medical History:   Diagnosis Date    Disorder of thyroid     THYROMEGALLY    MS (multiple sclerosis) (HCC)     Seizure (HCC)     Seizure disorder (HCC)     as a child    Visual impairment     GLASSES              Past Surgical History:   Procedure Laterality Date    APPENDECTOMY  02/14/2018    APPENDECTOMY      REMOVAL GALLBLADDER      2021    TONSILLECTOMY                  Social History     Socioeconomic History    Marital status: Single   Tobacco Use    Smoking status: Never    Smokeless tobacco: Never    Tobacco comments:     NON-SMOKER   Vaping Use    Vaping Use: Former    Substances: CBD    Devices: Disposable   Substance and Sexual Activity    Alcohol use: Yes     Comment: social     Drug use: Yes     Types: Cannabis     Comment: occasionally   Other Topics Concern    Caffeine Concern No     Comment: tea sometimes    Exercise Yes     Comment: 2x per week              Review of Systems    Positive for stated complaint: sore throat , fever  Other systems are as noted in HPI.  Constitutional and vital signs reviewed.      All other systems reviewed and negative except as noted above.    Physical Exam     ED Triage Vitals [03/14/24 1034]   /78   Pulse 66   Resp 18   Temp 98.2 °F (36.8 °C)   Temp src Oral   SpO2 98 %   O2 Device None  (Room air)       Current:/78   Pulse 66   Temp 98.2 °F (36.8 °C) (Oral)   Resp 18   Ht 160 cm (5' 3\")   Wt 108 kg   SpO2 98%   BMI 42.16 kg/m²         Physical Exam    GENERAL: The patient is well-developed well-nourished nontoxic, non-ill-appearing  HEENT: Normocephalic.  Atraumatic.  Extraocular motions are intact, cobblestone appearance is noted to the posterior oropharynx  NECK: Supple.  No meningitic signs are noted.   CHEST/LUNGS: Clear to auscultation.  There is no respiratory distress noted.  HEART/CARDIOVASCULAR: Regular.  There is no tachycardia.   SKIN: There is no rash.  NEURO: The patient is awake, alert, and oriented.  The patient is cooperative.    ED Course     Labs Reviewed   POCT RAPID STREP - Normal                      MDM   Pertinent Labs & Imaging studies reviewed. (See chart for details)  Differential diagnosis considered but not limited to: Strep, postnasal drip, viral syndrome, flulike symptoms  Patient coming in with sore throat 3 days with low-grade fevers. Patient provided with pain medication. Labs reviewed negative strep.  Will treat for possible flulike symptoms. Will discharge on over-the-counter supportive care see discharge note for instructions. Patient is comfortable with this plan.  Overall Pt looks good. Non-toxic, well-hydrated and in no respiratory distress. Vital signs are reassuring. Exam is reassuring. I do not believe pt  requires and additional  diagnostic studiesor intervention. I believe pt  can be discharged home to continue evaluation as an outpatient. Follow-up provider given. Discharge instructions given and reviewed. Return for any problems. All understand and agreewith the plan.    Please note that this report has been produced using speech recognition software and may contain errors related to that system including, but not limited to, errors in grammar, punctuation, and spelling, as well as words and phrases that possibly may have been recognized  inappropriately.  If there are any questions or concerns, contact the dictating provider for clarification.    Note to patient: The 21st Century Cures Act makes medical notes like these available to patients in the interest of transparency. However, this is a medical document intended as peer to peer communication. It is written in medical language and may contain abbreviations or verbiage that are unfamiliar. It may appear blunt or direct. Medical documents are intended to carry relevant information, facts as evident, and the clinical opinion of the practitioner.                                      Medical Decision Making      Disposition and Plan     Clinical Impression:  1. PND (post-nasal drip)    2. Flu-like symptoms         Disposition:  Discharge  3/14/2024 11:06 am    Follow-up:  Alize Moeller MD  [address] [id number] [phone]                Medications Prescribed:  Discharge Medication List as of 3/14/2024 11:08 AM

## 2024-03-14 NOTE — DISCHARGE INSTRUCTIONS
Follow-up with your primary care physician in one week if symptoms have not improved or symptoms are starting to get worse.  Over-the-counter Flonase and Zyrtec will be helpful  Increase fluids, keep well-hydrated.  Take Tylenol and Motrin for fever and pain.  Eat and drink anything that is soothing to the back of the throat.  Gargle with warm salt water, throat lozenges will be helpful.

## 2024-03-15 ENCOUNTER — TELEPHONE (OUTPATIENT)
Dept: FAMILY MEDICINE CLINIC | Facility: CLINIC | Age: 34
End: 2024-03-15

## 2024-03-15 DIAGNOSIS — Z30.09 COUNSELING FOR INITIATION OF BIRTH CONTROL METHOD: ICD-10-CM

## 2024-03-15 RX ORDER — MEDROXYPROGESTERONE ACETATE 150 MG/ML
150 INJECTION, SUSPENSION INTRAMUSCULAR
Qty: 1 EACH | Refills: 1 | Status: SHIPPED | OUTPATIENT
Start: 2024-03-15 | End: 2024-06-13

## 2024-03-15 NOTE — TELEPHONE ENCOUNTER
Call from patient  Requesting refill for her depo  Sent to [facility] [address] per patient request

## 2024-03-17 ENCOUNTER — PATIENT MESSAGE (OUTPATIENT)
Dept: FAMILY MEDICINE CLINIC | Facility: CLINIC | Age: 34
End: 2024-03-17

## 2024-03-18 NOTE — TELEPHONE ENCOUNTER
Last OV 10/5/23 with Lea  Saw Dr Moeller 9/28/23  Please advise    Yes. I’ve taken Motrin and Tylenol at first. Then started taken theraflu day and night and the tea powder. I also tried DayQuil and NyQuil. Nothing really working to make it any better.       You  Negar Gaona6 hours ago (8:12 AM)     SHAUN Bills,  Have you taken anything over the counter for your symptoms? Any difficulty breathing or chest pain?  -Latricia THAKUR, RN  [facility] Office Phone: [phone]       Negar ALDANA [facility] [place] Clinical Staff (supporting Alize Moeller MD)16 hours ago (10:33 PM)       The immediate care said if my symptoms weren’t improving to message you about them. I am still having issues with my throat that my coughs are worse and I actually even lost my voice the last two days. The fevers aren’t happening anymore but at this point I don’t know what to do. My throat hurts a lot. They said I don’t have strep and they think it flu but didn’t want to bother checking cause I been having these symptoms since Monday and said that there be no point cause they couldn’t do anything. Is there anything you can do for this pain?

## 2024-03-18 NOTE — TELEPHONE ENCOUNTER
From: Negar Gaona  To: Alize Moeller  Sent: 3/17/2024 10:33 PM CDT  Subject: Hello    The immediate care said if my symptoms weren’t improving to message you about them. I am still having issues with my throat that my coughs are worse and I actually even lost my voice the last two days. The fevers aren’t happening anymore but at this point I don’t know what to do. My throat hurts a lot. They said I don’t have strep and they think it flu but didn’t want to bother checking cause I been having these symptoms since Monday and said that there be no point cause they couldn’t do anything. Is there anything you can do for this pain?

## 2024-03-19 ENCOUNTER — OFFICE VISIT (OUTPATIENT)
Dept: FAMILY MEDICINE CLINIC | Facility: CLINIC | Age: 34
End: 2024-03-19
Payer: MEDICAID

## 2024-03-19 VITALS
HEART RATE: 79 BPM | SYSTOLIC BLOOD PRESSURE: 120 MMHG | BODY MASS INDEX: 41.11 KG/M2 | TEMPERATURE: 97 F | HEIGHT: 63 IN | DIASTOLIC BLOOD PRESSURE: 80 MMHG | RESPIRATION RATE: 18 BRPM | WEIGHT: 232 LBS | OXYGEN SATURATION: 98 %

## 2024-03-19 DIAGNOSIS — J04.0 LARYNGITIS: Primary | ICD-10-CM

## 2024-03-19 DIAGNOSIS — Z30.09 COUNSELING FOR INITIATION OF BIRTH CONTROL METHOD: ICD-10-CM

## 2024-03-19 PROCEDURE — 87637 SARSCOV2&INF A&B&RSV AMP PRB: CPT | Performed by: FAMILY MEDICINE

## 2024-03-19 PROCEDURE — 96372 THER/PROPH/DIAG INJ SC/IM: CPT | Performed by: FAMILY MEDICINE

## 2024-03-19 PROCEDURE — 99214 OFFICE O/P EST MOD 30 MIN: CPT | Performed by: FAMILY MEDICINE

## 2024-03-19 PROCEDURE — 81025 URINE PREGNANCY TEST: CPT | Performed by: FAMILY MEDICINE

## 2024-03-19 RX ORDER — PREDNISONE 20 MG/1
20 TABLET ORAL 2 TIMES DAILY
Qty: 10 TABLET | Refills: 0 | Status: SHIPPED | OUTPATIENT
Start: 2024-03-19 | End: 2024-03-24

## 2024-03-19 RX ORDER — MEDROXYPROGESTERONE ACETATE 150 MG/ML
150 INJECTION, SUSPENSION INTRAMUSCULAR ONCE
Status: COMPLETED | OUTPATIENT
Start: 2024-03-19 | End: 2024-03-19

## 2024-03-19 RX ADMIN — MEDROXYPROGESTERONE ACETATE 150 MG: 150 INJECTION, SUSPENSION INTRAMUSCULAR at 11:22:00

## 2024-03-19 NOTE — PROGRESS NOTES
Chief Complaint   Patient presents with    Sore Throat     Sore throat         HPI:    Patient ID: Negar Gaona is a 34 year old female.    HPI Last wek sym started with sore throat chills. Cough present. No sinus pressre she had fever in past but resolved now. No sob. Voice change Saturday.     Review of Systems  Neg sob pos       Current Outpatient Medications   Medication Sig Dispense Refill    gabapentin 100 MG Oral Cap Week 1- take 1 capsule three times a day, Week 2- if nerve pain still present, take 2 caps three times a day, Week 3 and on- if tolerating and if nerve pain still present, take 3 caps three times a day 270 capsule 0    methylPREDNISolone (MEDROL) 4 MG Oral Tablet Therapy Pack Take as directed (Patient not taking: Reported on 3/20/2024) 1 each 0    TECFIDERA 240 MG Oral Capsule Delayed Release TAKE 1 CAPSULE IN THE MORNING AND 1 CAPSULE BEFORE BEDTIME 60 capsule 2    ondansetron 4 MG Oral Tablet Dispersible Take 1 tablet (4 mg total) by mouth every 8 (eight) hours as needed for Nausea. 10 tablet 0    Cholecalciferol (VITAMIN D3) 50 MCG (2000 UT) Oral Chew Tab       Cyanocobalamin (B-12) 1000 MCG Oral Tab       topiramate 25 MG Oral Tab Week 1: 1 tab at night, Week 2: 2 tab nighlty, Week 3: 3 tabs nightly, Week 4: if tolerating, and if headaches persist, increase to 4 tabs nightly. Alternatively, if headaches improve at lower dose, or if have side effects with higher dose, can decrease to lower prior dose. (Patient taking differently: as needed. Week 1: 1 tab at night, Week 2: 2 tab nighlty, Week 3: 3 tabs nightly, Week 4: if tolerating, and if headaches persist, increase to 4 tabs nightly. Alternatively, if headaches improve at lower dose, or if have side effects with higher dose, can decrease to lower prior dose.) 120 tablet 1     Allergies:No Known Allergies    HISTORY:  Past Medical History:   Diagnosis Date    Disorder of thyroid     THYROMEGALLY    Low back pain     MS (multiple  sclerosis) (HCC)     Seizure (HCC)     Seizure disorder (HCC)     as a child    Visual impairment     GLASSES      Past Surgical History:   Procedure Laterality Date    APPENDECTOMY  02/14/2018    APPENDECTOMY      REMOVAL GALLBLADDER      2021    TONSILLECTOMY        Family History   Problem Relation Age of Onset    Diabetes Father     Hypertension Father     Diabetes Paternal Grandfather     Breast Cancer Maternal Aunt 40    Breast Cancer Paternal Aunt 30    Breast Cancer Maternal Cousin Female 30      Social History:   Social History     Socioeconomic History    Marital status: Single   Tobacco Use    Smoking status: Never    Smokeless tobacco: Never    Tobacco comments:     NON-SMOKER   Vaping Use    Vaping Use: Former    Substances: CBD    Devices: Disposable   Substance and Sexual Activity    Alcohol use: Yes     Comment: social     Drug use: Yes     Types: Cannabis     Comment: occasionally   Other Topics Concern    Caffeine Concern No     Comment: tea sometimes    Exercise Yes     Comment: 2x per week        PHYSICAL EXAM:    /80   Pulse 79   Temp 96.7 °F (35.9 °C) (Temporal)   Resp 18   Ht 5' 3\" (1.6 m)   Wt 232 lb (105.2 kg)   SpO2 98%   BMI 41.10 kg/m²    Physical Exam  Constitutional:       General: She is not in acute distress.     Appearance: She is not ill-appearing.   HENT:      Right Ear: Tympanic membrane normal.      Left Ear: Tympanic membrane normal.      Nose: No congestion or rhinorrhea.      Mouth/Throat:      Pharynx: No oropharyngeal exudate or posterior oropharyngeal erythema.   Cardiovascular:      Rate and Rhythm: Normal rate and regular rhythm.      Pulses: Normal pulses.      Heart sounds: Normal heart sounds.   Pulmonary:      Breath sounds: Normal breath sounds.   Neurological:      Mental Status: She is alert.              ASSESSMENT/PLAN:   1. Laryngitis  Meds sent    - predniSONE 20 MG Oral Tab; Take 1 tablet (20 mg total) by mouth 2 (two) times daily for 5 days.   Dispense: 10 tablet; Refill: 0  - SARS-CoV-2/Flu A and B/RSV by PCR ([facility]) [E]; Future  - SARS-CoV-2/Flu A and B/RSV by PCR ([facility]) [E]    2. Counseling for initiation of birth control method  Meds sent  - Urine Preg Test  - medroxyPROGESTERone (Depo-Provera) 150 MG/ML injection 150 mg             Return if symptoms worsen or fail to improve, for DR. WINSLOW.

## 2024-03-19 NOTE — PROGRESS NOTES
Patient here for depo  Urine preg negative  Depo administered  Next due June 11-18  Tolerated well  Left office in stable condition

## 2024-03-19 NOTE — TELEPHONE ENCOUNTER
Future Appointments   Date Time Provider Department Center   3/19/2024 10:40 AM Alize Moeller MD [facility] [place]   3/20/2024  2:35 PM Nicky Ochoa DO [facility] [place]   8/9/2024  9:20 AM Ami Mukherjee PA-C [facility]

## 2024-03-20 ENCOUNTER — OFFICE VISIT (OUTPATIENT)
Dept: NEUROLOGY | Facility: CLINIC | Age: 34
End: 2024-03-20
Payer: MEDICAID

## 2024-03-20 VITALS — RESPIRATION RATE: 16 BRPM | HEART RATE: 66 BPM | SYSTOLIC BLOOD PRESSURE: 114 MMHG | DIASTOLIC BLOOD PRESSURE: 70 MMHG

## 2024-03-20 DIAGNOSIS — M54.16 LUMBAR RADICULOPATHY: ICD-10-CM

## 2024-03-20 DIAGNOSIS — G35 MS (MULTIPLE SCLEROSIS) (HCC): Primary | ICD-10-CM

## 2024-03-20 DIAGNOSIS — G56.01 RIGHT CARPAL TUNNEL SYNDROME: ICD-10-CM

## 2024-03-20 LAB
FLUAV + FLUBV RNA SPEC NAA+PROBE: NOT DETECTED
FLUAV + FLUBV RNA SPEC NAA+PROBE: NOT DETECTED
RSV RNA SPEC NAA+PROBE: NOT DETECTED
SARS-COV-2 RNA RESP QL NAA+PROBE: NOT DETECTED

## 2024-03-20 PROCEDURE — 99214 OFFICE O/P EST MOD 30 MIN: CPT | Performed by: OTHER

## 2024-03-20 RX ORDER — GABAPENTIN 100 MG/1
CAPSULE ORAL
Qty: 270 CAPSULE | Refills: 0 | Status: SHIPPED | OUTPATIENT
Start: 2024-03-20

## 2024-03-20 NOTE — PROGRESS NOTES
[facility] [place]   Neurology    Negar Gaona Patient Status:  No patient class for patient encounter    1990 MRN [id number]   Location [facility], [facility] PCP Alize Moeller MD              HPI:   Negar Gaona is a(n) 34 year old female who presents for evaluation of MS. She was seen over 3 years ago, and was started on Tecfidera in . She moved to [place], and now has moved back. She is a . She ran out of Tecfidera, and because of her phone changing, the company wasn't able to contact her to restart it. She came off Tecfidera 2022. In 2019 she had L arm heaviness. Prior to Tecfidera, she was seen in the ED and MRI of the brain showed two ovoid enhancing lesions.  She reports occasionally she had once a visual distortion. Since 2022, she gets tingling on the left face and arm, it will last 3-5 seconds, occurs 5-6 times per day. When she is stressed, it will last until she calms down. Also get LH with these brief paresthesias, as well as left eye blurriness. Feels like words slur as well. In 10/2022 she was seen in the ED, and was given a medrol dose pack. The episodes became less frequent after the medrol dose pack. Feels like memory is worse.   Patient here to follow up regarding MS. States that numbness in hands, right leg on/off. Top of foot on right and left toe since beginning of October. Vision blurred in 2023 episode, did have fall but on to couch. Numbness similar to when first diagnosed with MS. Started in 2023. Tingling sensation down arms and into fingertips, lasting 1-2 minutes. Now happening 1-2 times a week. Was also getting sharp pains radiating from her R hip down her leg, to the foot. This now is also less frequent, lasting around 5 minutes. Now has a constant pain in the dorsum of her right foot, worse when puts pressure on it. Intermittent has burning left toe pain when walking. Is still  a . Had an instance of getting up too fast, got LH and developed tunnel vision, sat back down and improved.   Interim  Patient here to follow up regarding MS and back pain. Legs will still have on/off numbness but no worse since last visit. Was having increased low back pain that was radiation down left leg. That has gotten better so did not start medrol dose pack yet. Was seen in ED as well for this. Intermittent hand paresthesias that radiate from the shoulder to the forearm or fingers are better. Mainly triggered by playing video games.     Tecfidera- started 10/2019, stopped in 5/2022, then restart again Spring 2023    Pertinent imaging and laboratory work-up:   Component      Latest Ref Rng 2/29/2024   WBC      4.0 - 11.0 x10(3) uL 6.3    RBC      3.80 - 5.30 x10(6)uL 4.46    Hemoglobin      12.0 - 16.0 g/dL 13.4    Hematocrit      35.0 - 48.0 % 41.4    Platelet Count      150.0 - 450.0 10(3)uL 285.0    MCV      80.0 - 100.0 fL 92.8    MCH      26.0 - 34.0 pg 30.0    MCHC      31.0 - 37.0 g/dL 32.4    RDW      % 12.0      Component      Latest Ref Rng 2/29/2024   AST (SGOT)      15 - 37 U/L 19    ALT (SGPT)      13 - 56 U/L 27    ALKALINE PHOSPHATASE      37 - 98 U/L 78    Total Bilirubin      0.1 - 2.0 mg/dL 0.5    PROTEIN, TOTAL      6.4 - 8.2 g/dL 8.1          EMG 1/2024  Conclusion:  1.  There is evidence of a chronic right S1 lumbosacral radiculopathy.  2.  There is evidence of a very mild right median compressive mononeuropathy at the wrist.  3.  There is no evidence of a right cervical radiculopathy.  4.  There is no evidence of a polyneuropathy.    MRI cervical 11/2023  CONCLUSION:    1. Mild degenerative changes most pronounced at C5-C6   MRI thoracic 11/2023  CONCLUSION:    1. Unremarkable MR appearance of the thoracic cord.   2. There are mildly prominent disc protrusions at T6-T7 and T11-T12.  There is mild spinal canal stenosis at T11-T12.         XR lumbar spine 6/2023  CONCLUSION:     1. Mild degenerative change without significant change when compared to most recent lumbar spine radiographs dated 10/2/2019.   MRI cervical 3/2023  CONCLUSION:       1. No evidence of demyelinating lesion or abnormal enhancement within the cervical cord.       2. Mild degenerative changes of the cervical spine from C4-5 to C6-7 are stable.   MRI thoracic 3/2023  CONCLUSION:       1.  No evidence of demyelinating lesions within the thoracic cord.      2. Stable mild degenerative changes of the thoracic spine at T6-7 and T11-12.       Component      Latest Ref Rng 3/10/2023   Vitamin B12      200 - 1,100 pg/mL 339    TSH      mIU/L 1.34          MRI brain 10/2022  CONCLUSION:  Scattered areas of FLAIR signal hyperintensity are present within the white matter with a few new areas when compared with the prior exam of 1/31/2020 such as within the left periventricular white matter, adjacent to the left lateral aspect   of the fourth ventricle, and within the right parietal subcortical white matter.  None of these areas demonstrates enhancement or restricted diffusion to suggest acute demyelination.  This is most compatible with the reported history of demyelinating   disease.     MRI brain/MRA ww/o contrast 7/30/19  CONCLUSION:    1. There are 2 ovoid lesions of high T2/FLAIR signal within the right hemisphere involving the periventricular white matter with associated mild restriction of diffusion as well as mild post-contrast enhancement.  The appearance of the lesions are most   consistent with regions of demyelination/MS.  Please correlate clinically.  2. Unremarkable MR angiography of the head and neck.     MRI cervical 8/2019  CONCLUSION:       1. No focal spinal cord signal abnormality identified.  No abnormal enhancement in the cervical spine.     2. Mild degenerative changes in the cervical spine as above without significant spinal canal or neural foraminal stenosis at any level.     MRI thoracic  8/2019  CONCLUSION:       1. No focal spinal cord signal abnormality identified.  No abnormal enhancement.     2. Mild degenerative changes in the thoracic spine as above without significant spinal canal or neural foraminal stenosis at any level.    Component      Latest Ref Rng & Units 8/12/2019   TOTAL VOLUME CSF      mL 8.0   CSF TUBE #       4   Color CSF      Colorless Colorless   Clarity CSF      Clear Clear   WBC CSF      0 - 5 /mm3 17 (H)   RBC CSF      /mm3 6   CSF BAND OLIGOCLONAL       Positive (A)   CSF OLIGOCLONAL BANDS NUMBER      0 - 1 Bands 7 (H)   CSF OLIG INTERPRETATION       See Note   HEMOGLOBIN A1c      <5.7 % 4.8   ESTIMATED AVERAGE GLUCOSE      68 - 126 mg/dL 91   Lyme Screen IgG and IgM      Negative Negative   ANNIE SCREEN      Negative Negative   Total Protein CSF      15.0 - 45.0 mg/dL 29.5   Glucose CSF      40 - 70 mg/dL 49            Past Medical History:  Past Medical History:   Diagnosis Date    Disorder of thyroid     THYROMEGALLY    Low back pain     MS (multiple sclerosis) (HCC)     Seizure (HCC)     Seizure disorder (HCC)     as a child    Visual impairment     GLASSES        Past Surgical History:  Past Surgical History:   Procedure Laterality Date    APPENDECTOMY  02/14/2018    APPENDECTOMY      REMOVAL GALLBLADDER      2021    TONSILLECTOMY         Family History:  family history includes Breast Cancer (age of onset: 30) in her maternal cousin female and paternal aunt; Breast Cancer (age of onset: 40) in her maternal aunt; Diabetes in her father and paternal grandfather; Hypertension in her father.      Social History:   reports that she has never smoked. She has never used smokeless tobacco. She reports current alcohol use. She reports current drug use. Drug: Cannabis.    Allergies:  No Known Allergies    MEDICATIONS:    Current Outpatient Medications:     gabapentin 100 MG Oral Cap, Week 1- take 1 capsule three times a day, Week 2- if nerve pain still present, take 2 caps three  times a day, Week 3 and on- if tolerating and if nerve pain still present, take 3 caps three times a day, Disp: 270 capsule, Rfl: 0    predniSONE 20 MG Oral Tab, Take 1 tablet (20 mg total) by mouth 2 (two) times daily for 5 days., Disp: 10 tablet, Rfl: 0    TECFIDERA 240 MG Oral Capsule Delayed Release, TAKE 1 CAPSULE IN THE MORNING AND 1 CAPSULE BEFORE BEDTIME, Disp: 60 capsule, Rfl: 2    ondansetron 4 MG Oral Tablet Dispersible, Take 1 tablet (4 mg total) by mouth every 8 (eight) hours as needed for Nausea., Disp: 10 tablet, Rfl: 0    Cholecalciferol (VITAMIN D3) 50 MCG (2000 UT) Oral Chew Tab, , Disp: , Rfl:     Cyanocobalamin (B-12) 1000 MCG Oral Tab, , Disp: , Rfl:     topiramate 25 MG Oral Tab, Week 1: 1 tab at night, Week 2: 2 tab nighlty, Week 3: 3 tabs nightly, Week 4: if tolerating, and if headaches persist, increase to 4 tabs nightly. Alternatively, if headaches improve at lower dose, or if have side effects with higher dose, can decrease to lower prior dose. (Patient taking differently: as needed. Week 1: 1 tab at night, Week 2: 2 tab nighlty, Week 3: 3 tabs nightly, Week 4: if tolerating, and if headaches persist, increase to 4 tabs nightly. Alternatively, if headaches improve at lower dose, or if have side effects with higher dose, can decrease to lower prior dose.), Disp: 120 tablet, Rfl: 1    methylPREDNISolone (MEDROL) 4 MG Oral Tablet Therapy Pack, Take as directed (Patient not taking: Reported on 3/20/2024), Disp: 1 each, Rfl: 0      Review of Systems:   A comprehensive 10 point review of systems was completed.     Pertinent positives and negatives noted in the HPI.         PHYSICAL EXAM:   Neurologic Exam  Vitals  Vitals:    03/20/24 1447   BP: 114/70   Pulse: 66   Resp: 16     General Appearance: Patient is a 34 year old female in no acute distress  Cardiac: Normal rate & regular rhythm  Skin: There are no rashes or other skin lesions.  Musculoskeletal: There is no scoliosis, or joint  deformities  Neurologic examination:  Mental status: Patient is alert, attentive, and oriented x 3. Language is coherent and fluent without aphasia. Memory, comprehension and ability to follow commands were intact.   Cranial nerves II-XII: Optic discs were sharp. Pupils were round and reacted to light. Extraocular movements were full. There was no face, jaw, palate or tongue weakness or atrophy. Facial sensation was normal. Hearing was grossly intact. Shoulder shrug was normal.   Motor exam revealed normal muscle bulk and tone. No atrophy or fasciculations. Manual muscle testing revealed MRC grade 5/5 strength throughout including proximal and distal muscles of the arms and legs.  Deep tendon reflexes were 2 at the biceps, brachioradialis, triceps, knee jerk, and ankle jerk. Plantar responses were flexor bilaterally. 1 beat of clonus bilat  Sensory exam revealed normal light touch perception. Vibratory perception 17 sec bilat and proprioception were intact at the toes. Pinprick and temperature were normal in LE's, hyperesthetic on the right dorsum. Romberg sign was absent.  Complex motor skills revealed normal coordination. Finger-nose-finger intact.   Gait was narrow and stable, was able to walk on heels, toes and tandem without any difficulty.     ASSESSMENT/ACTIVE PROBLEM LIST:     Encounter Diagnoses   Name Primary?    MS (multiple sclerosis) ([facility]) Yes    Lumbar radiculopathy     Right carpal tunnel syndrome      Discussion/Plan:  Clinical upper lumbar radiculopathy, as well as history of right S1 radiculopathy, latter found on EMG  Start gabapentin 100mg TID  Start physical therapy  If radicular pain does not improve, will recommend MRI L spine    R very mild carpal tunnel and intermittent median nerve irritation  Avoid positions discussed. Wrist splint    RRMS-   MRI brain 10/2022 overall showed no active lesion, and there was minimal progression compared to 2019  Low Vitamin D on 2000 daily, increase Vit D to  4000 units daily  Continue Tecfidera   Check CBC/CMP every 6 months    Short term memory loss- B12 low normal, continue B12 500mcg daily oral, one consideration is concentration/attention deficit related, make sure having regular bedtime and wake up times    Requested Prescriptions     Signed Prescriptions Disp Refills    gabapentin 100 MG Oral Cap 270 capsule 0     Sig: Week 1- take 1 capsule three times a day, Week 2- if nerve pain still present, take 2 caps three times a day, Week 3 and on- if tolerating and if nerve pain still present, take 3 caps three times a day          We discussed in depth regarding the diagnosis, prognosis, treatment. The patient was given ample opportunity to ask questions. All questions and concerns were addressed.     Shalonda Ochoa,   Neuromuscular and General Neurology  [facility]

## 2024-03-20 NOTE — PATIENT INSTRUCTIONS
After your visit at the [place] office  today,  please direct any follow up questions or medication needs to the staff in our  [place] office so that your concerns may be promptly addressed.  We are available through [employer] or at the numbers below:    The phone number is:   [phone] option #1    The fax number is:  [phone]    Your pharmacy should also send any requests electronically to the [place] office.     Refill policies:    Allow 2-3 business days for refills; controlled substances may take longer.  Contact your pharmacy at least 5 days prior to running out of medication and have them send an electronic request or submit request through the “request refill” option in your [employer] account.  Refills are not addressed on weekends; covering physicians do not authorize routine medications on weekends.  No narcotics or controlled substances are refilled after noon on Fridays or by on call physicians.  By law, narcotics must be electronically prescribed.  A 30 day supply with no refills is the maximum allowed.  If your prescription is due for a refill, you may be due for a follow up appointment.  To best provide you care, patients receiving routine medications need to be seen at least once a year.  Patients receiving narcotic/controlled substance medications need to be seen at least once every 3 months.  In the event that your preferred pharmacy does not have the requested medication in stock (e.g. Backordered), it is your responsibility to find another pharmacy that has the requested medication available.  We will gladly send a new prescription to that pharmacy at your request.    Scheduling Tests:    If your physician has ordered radiology tests such as MRI or CT scans, please contact Central Scheduling at [phone] right away to schedule the test.  Once scheduled, the [facility] Centralized Referral Team will work with your insurance carrier to obtain pre-certification or prior authorization.   Depending on your insurance carrier, approval may take 3-10 days.  It is highly recommended patients assure they have received an authorization before having a test performed.  If test is done without insurance authorization, patient may be responsible for the entire amount billed.      Precertification and Prior Authorizations:  If your physician has recommended that you have a procedure or additional testing performed the [facility] Centralized Referral Team will contact your insurance carrier to obtain pre-certification or prior authorization.    You are strongly encouraged to contact your insurance carrier to verify that your procedure/test has been approved and is a COVERED benefit.  Although the [facility] Centralized Referral Team does its due diligence, the insurance carrier gives the disclaimer that \"Although the procedure is authorized, this does not guarantee payment.\"    Ultimately the patient is responsible for payment.   Thank you for your understanding in this matter.  Paperwork Completion:  If you require FMLA or disability paperwork for your recovery, please make sure to either drop it off or have it faxed to our office at [phone]. Be sure the form has your name and date of birth on it.  The form will be faxed to our Forms Department and they will complete it for you.  There is a 25$ fee for all forms that need to be filled out.  Please be aware there is a 10-14 day turnaround time.  You will need to sign a release of information (CHANNING) form if your paperwork does not come with one.  You may call the Forms Department with any questions at [phone].  Their fax number is [phone].

## 2024-03-20 NOTE — PROGRESS NOTES
Patient here to follow up regarding MS and back pain. Legs will still have on/off numbness but no worse since last visit. Was having increased low back pain that was radiation down left leg. That has gotten better so did not start medrol dose pack yet.

## 2024-03-22 ENCOUNTER — TELEPHONE (OUTPATIENT)
Dept: FAMILY MEDICINE CLINIC | Facility: CLINIC | Age: 34
End: 2024-03-22

## 2024-03-22 NOTE — TELEPHONE ENCOUNTER
----- Message from SANIA Mueller sent at 3/22/2024  8:43 AM CDT -----  COVID, Influenza, RSV negative

## 2024-03-29 ENCOUNTER — PATIENT MESSAGE (OUTPATIENT)
Dept: FAMILY MEDICINE CLINIC | Facility: CLINIC | Age: 34
End: 2024-03-29

## 2024-03-29 NOTE — TELEPHONE ENCOUNTER
From: Negar Gaona  To: Alize Moeller  Sent: 3/29/2024 11:50 AM CDT  Subject: Hello    Is there anyway you can ask Ms. Nunn to email me again? It won’t let me reply back to her cause the message is so late and I can’t seem to start a new message with her about therapists.

## 2024-04-06 ENCOUNTER — PATIENT MESSAGE (OUTPATIENT)
Dept: NEUROLOGY | Facility: CLINIC | Age: 34
End: 2024-04-06

## 2024-04-08 NOTE — TELEPHONE ENCOUNTER
From: Negar Gaona  Sent: 4/6/2024 9:04 PM CDT  To: Annalee Otoole Nurse  Subject: Hi    I am also starting to feel sharp pains on the right side of my stomach and both my legs feel like they’re trying to go numb. Like the feeling you get when your legs fall asleep and you are trying to wake them but without the pins a needle feeling just the weird numbness.

## 2024-04-09 ENCOUNTER — LAB ENCOUNTER (OUTPATIENT)
Dept: LAB | Age: 34
End: 2024-04-09
Attending: FAMILY MEDICINE
Payer: MEDICAID

## 2024-04-09 DIAGNOSIS — R39.15 URGENCY OF URINATION: ICD-10-CM

## 2024-04-09 LAB
BILIRUB UR QL STRIP.AUTO: NEGATIVE
CLARITY UR REFRACT.AUTO: CLEAR
GLUCOSE UR STRIP.AUTO-MCNC: NORMAL MG/DL
KETONES UR STRIP.AUTO-MCNC: NEGATIVE MG/DL
LEUKOCYTE ESTERASE UR QL STRIP.AUTO: NEGATIVE
NITRITE UR QL STRIP.AUTO: NEGATIVE
PH UR STRIP.AUTO: 7 [PH] (ref 5–8)
PROT UR STRIP.AUTO-MCNC: NEGATIVE MG/DL
SP GR UR STRIP.AUTO: 1.02 (ref 1–1.03)
UROBILINOGEN UR STRIP.AUTO-MCNC: NORMAL MG/DL

## 2024-04-09 PROCEDURE — 81001 URINALYSIS AUTO W/SCOPE: CPT

## 2024-04-10 ENCOUNTER — TELEPHONE (OUTPATIENT)
Dept: FAMILY MEDICINE CLINIC | Facility: CLINIC | Age: 34
End: 2024-04-10

## 2024-04-10 DIAGNOSIS — R31.29 OTHER MICROSCOPIC HEMATURIA: Primary | ICD-10-CM

## 2024-04-10 NOTE — TELEPHONE ENCOUNTER
----- Message from SANIA Mueller sent at 4/10/2024 11:07 AM CDT -----  + blood and skin cells  Is patient having any symptoms? Did not collect culture so would need to come back if symptomatic

## 2024-04-10 NOTE — TELEPHONE ENCOUNTER
Called the lab to discuss if a culture was done.  They said a culture was not triggered to be done due to the UA results not showing white blood cells.  MCM sent to pt.

## 2024-04-10 NOTE — TELEPHONE ENCOUNTER
Spoke to patient. Does not sound like an MS flair, but if urology work up, including bladder US, is negative, then instructed patient to call back, and may order further imaging (MRI thoracic) and treatment. Lumbar radiculopathy seemed to be an issue a few days ago, but now better.

## 2024-04-12 ENCOUNTER — TELEPHONE (OUTPATIENT)
Dept: FAMILY MEDICINE CLINIC | Facility: CLINIC | Age: 34
End: 2024-04-12

## 2024-04-12 ENCOUNTER — HOSPITAL ENCOUNTER (OUTPATIENT)
Dept: ULTRASOUND IMAGING | Age: 34
Discharge: HOME | End: 2024-04-12
Attending: NURSE PRACTITIONER
Payer: MEDICAID

## 2024-04-12 DIAGNOSIS — R31.29 OTHER MICROSCOPIC HEMATURIA: ICD-10-CM

## 2024-04-12 DIAGNOSIS — N28.89 KIDNEY MASS: Primary | ICD-10-CM

## 2024-04-12 PROCEDURE — 76770 US EXAM ABDO BACK WALL COMP: CPT | Performed by: NURSE PRACTITIONER

## 2024-04-12 NOTE — TELEPHONE ENCOUNTER
Patient advised and verbalized understanding.  Patient has appointment with urology scheduled  Advised patient to discuss results with them as well.  Patient to go to ER for any new or worsening symptoms  Future Appointments   Date Time Provider Department Center   4/18/2024  9:00 AM Melissa Lamb PA-C [id number]   4/24/2024  2:45 PM Nishi Hamilton, PT [facility]   4/26/2024  3:00 PM Nishi Hamilton, PT [facility]   4/29/2024  2:45 PM Nishi Hamilton, PT [facility]   5/1/2024  3:30 PM Nishi Hamilton, PT [facility]   5/6/2024  2:45 PM Nishi Hamilton, PT [facility]   6/20/2024  8:40 AM Alize Moeller MD [facility] [place]   8/9/2024  9:20 AM Ami Mukherjee PA-C [facility]

## 2024-04-12 NOTE — TELEPHONE ENCOUNTER
----- Message from SANIA Mueller sent at 4/12/2024  2:53 PM CDT -----  There is a mass in her kidney that needs evaluated with urology. Refer to Dr. Duarte.   Can we call to help facilitate the appt. I can order additional imaging if they prefer or wait until she sees them

## 2024-04-12 NOTE — TELEPHONE ENCOUNTER
Called Dr. Duarte's office to get patient in for kidney mass.  Spoke with  - patient already has an appointment scheduled on 4/18/24

## 2024-04-15 DIAGNOSIS — G35 MS (MULTIPLE SCLEROSIS) (HCC): ICD-10-CM

## 2024-04-16 RX ORDER — DIMETHYL FUMARATE 240 MG/1
CAPSULE ORAL
Qty: 60 CAPSULE | Refills: 9 | Status: SHIPPED | OUTPATIENT
Start: 2024-04-16

## 2024-04-16 NOTE — TELEPHONE ENCOUNTER
Medication: Tefidera 240 mg     Date of last refill: 11/22/2023 (#60/2)  [place]24     Last office visit: 03/20/2024  Due back to clinic per last office note:  6 months  Date next office visit scheduled:    Future Appointments   Date Time Provider Department Center   4/18/2024  9:00 AM Melissa Lamb PA-C [id number]   4/24/2024  2:45 PM Nishi Hamilton, PT [facility]   4/26/2024  3:00 PM Nishi Hamilton, PT [facility]   4/29/2024  2:45 PM Nishi Hamilton, PT [facility]   5/1/2024  3:30 PM Nishi Hamilton, PT [facility]   5/6/2024  2:45 PM Nishi Hamilton, PT [facility]   6/20/2024  8:40 AM Alize Moeller MD [facility] [place]   8/9/2024  9:20 AM Ami Mukherjee PA-C [id number]           Last OV note recommendation:    Discussion/Plan:  Clinical upper lumbar radiculopathy, as well as history of right S1 radiculopathy, latter found on EMG  Start gabapentin 100mg TID  Start physical therapy  If radicular pain does not improve, will recommend MRI L spine     R very mild carpal tunnel and intermittent median nerve irritation  Avoid positions discussed. Wrist splint     RRMS-   MRI brain 10/2022 overall showed no active lesion, and there was minimal progression compared to 2019  Low Vitamin D on 2000 daily, increase Vit D to 4000 units daily  Continue Tecfidera   Check CBC/CMP every 6 months

## 2024-04-18 ENCOUNTER — OFFICE VISIT (OUTPATIENT)
Dept: SURGERY | Facility: CLINIC | Age: 34
End: 2024-04-18
Payer: MEDICAID

## 2024-04-18 DIAGNOSIS — R82.90 URINE FINDING: ICD-10-CM

## 2024-04-18 DIAGNOSIS — R31.29 MICROSCOPIC HEMATURIA: Primary | ICD-10-CM

## 2024-04-18 DIAGNOSIS — N28.89 RENAL MASS: ICD-10-CM

## 2024-04-18 LAB
APPEARANCE: CLEAR
BILIRUBIN: NEGATIVE
GLUCOSE (URINE DIPSTICK): NEGATIVE MG/DL
KETONES (URINE DIPSTICK): NEGATIVE MG/DL
LEUKOCYTES: NEGATIVE
MULTISTIX LOT#: ABNORMAL NUMERIC
NITRITE, URINE: NEGATIVE
PH, URINE: 7 (ref 4.5–8)
PROTEIN (URINE DIPSTICK): NEGATIVE MG/DL
SPECIFIC GRAVITY: 1.02 (ref 1–1.03)
URINE-COLOR: YELLOW
UROBILINOGEN,SEMI-QN: 0.2 MG/DL (ref 0–1.9)

## 2024-04-18 PROCEDURE — 99203 OFFICE O/P NEW LOW 30 MIN: CPT

## 2024-04-18 PROCEDURE — 81002 URINALYSIS NONAUTO W/O SCOPE: CPT

## 2024-04-18 NOTE — PROGRESS NOTES
[facility], [facility]    Urology Consult Note    History of Present Illness:   Patient is a(n) 34 year old female with hx of MS, thyromegaly who presents for microscopic hematuria.     Pt initially had severe back pain on 4/6/24 that she went to the ER for. Eventually this developed into frequency and urgency with voiding. She was also very constipated at this time. UA positive for 6-10 RBC. Culture not reflexed. PCP ordered KBUS which showed 4.5cm mass in left kidney suspicious for RCC but could be focal pyelo. Further imaging recommended.    Since then, she has episodes of needing to void [id number] with stinging sensation every few days. No longer constipated. Having soft BM daily. No fever, chills.     No hx of UTI, stones, or pyelo. Currently has depo and no period in yrs. Fhx positive for prostate cx in a couple cousins and breast cx in a couple cousins. No smoking hx.     UA today moderate blood.   HISTORY:  Past Medical History:    Disorder of thyroid    THYROMEGALLY    Low back pain    MS (multiple sclerosis) (HCC)    Seizure (HCC)    Seizure disorder (HCC)    as a child    Visual impairment    GLASSES      Past Surgical History:   Procedure Laterality Date    Appendectomy  02/14/2018    Appendectomy      Removal gallbladder      2021    Tonsillectomy        Family History   Problem Relation Age of Onset    Diabetes Father     Hypertension Father     Diabetes Paternal Grandfather     Breast Cancer Maternal Aunt 40    Breast Cancer Paternal Aunt 30    Breast Cancer Maternal Cousin Female 30      Social History:   Social History     Socioeconomic History    Marital status: Single   Tobacco Use    Smoking status: Never    Smokeless tobacco: Never    Tobacco comments:     NON-SMOKER   Vaping Use    Vaping status: Former    Substances: CBD    Devices: Disposable   Substance and Sexual Activity    Alcohol use: Yes     Comment: social     Drug use: Yes     Types: Cannabis      Comment: occasionally   Other Topics Concern    Caffeine Concern No     Comment: tea sometimes    Exercise Yes     Comment: 2x per week        Allergies  No Known Allergies    Review of Systems:   A 10-point review of systems was completed and is negative other than as noted above.    Physical Exam:   There were no vitals taken for this visit.    GENERAL APPEARANCE: no acute distress  NEUROLOGIC: converses appropriately  HEAD: atraumatic, normocephalic  LUNGS: non-labored breathing  ABDOMEN: soft, nontender, non-distended  BACK: no CVA tenderness  PSYCH: appropriate affect and mood    Results:     Laboratory Data:  Lab Results   Component Value Date    WBC 6.3 02/29/2024    HGB 13.4 02/29/2024    .0 02/29/2024     Lab Results   Component Value Date     02/29/2024    K 4.1 02/29/2024     02/29/2024    CO2 24.0 02/29/2024    BUN 11 02/29/2024    GLU 96 02/29/2024    GFRAA 108 06/20/2022    AST 19 02/29/2024    ALT 27 02/29/2024    TP 8.1 02/29/2024    ALB 3.8 02/29/2024    CA 9.3 02/29/2024    MG 2.2 10/05/2022       Urinalysis Results (last 3 years):  Recent Labs     12/27/21  1032 10/05/22  1746 10/21/23  1536 04/09/24  1517 04/18/24  0917   COLORUR Yellow Yellow  --  Light-Yellow  --    CLARITY Clear Clear  --  Clear  --    SPECGRAVITY 1.025 1.019 >=1.030 1.019 1.020   PHURINE 7.5 6.0  --  7.0 7.0   PROUR Negative Negative  --  Negative  --    GLUUR Negative Negative Negative Normal  --    KETUR 40* Negative  --  Negative  --    BILUR Negative Negative  --  Negative  --    BLOODURINE Trace-lysed* Moderate*  --  1+*  --    NITRITE Negative Negative  --  Negative Negative   UROBILINOGEN 0.2 <2.0  --  Normal  --    LEUUR Negative Negative  --  Negative  --    WBCUR  --  1-5  --  None Seen  --    RBCUR  --  6-10*  --  6-10*  --    BACUR  --  None Seen  --  None Seen  --        Urine Culture Results (last 3 years):  No results found for: \"URINECUL\"    Imaging  US KIDNEY/BLADDER (CPT=76770)    Result  Date: 4/12/2024  PROCEDURE:  US KIDNEY/BLADDER (CPT=76770)  COMPARISON:  [facility], US ABDOMEN COMPLETE (CPT=76700), 12/27/2021, 11:07 AM.  INDICATIONS:  R31.29 Other microscopic hematuria  TECHNIQUE:  Transabdominal gray scale ultrasound imaging of the bilateral kidneys and bladder was performed.  Routine technique was utilized.   PATIENT STATED HISTORY: (As transcribed by Technologist)     FINDINGS:   RIGHT KIDNEY MEASUREMENTS:  12.1 cm ECHOGENICITY:  Normal. HYDRONEPHROSIS:  None. CYSTS/STONES/MASSES:  None.  LEFT KIDNEY MEASUREMENTS:  10.1 cm ECHOGENICITY:  Normal. HYDRONEPHROSIS:  None. CYSTS/STONES/MASSES:  Solid-appearing partly exophytic mass is noted interpolar region of left kidney measuring up to 4.5 cm in diameter.  This does appear to demonstrate increased Doppler signal.  BLADDER:  Normal. OTHER:  Negative.            CONCLUSION:  4.5 cm mass is suggested in the left kidney.  This is very suspicious for renal neoplasm although could potentially represent focal area of pyelonephritis.  Additional evaluation with pre and post-contrast CT or MRI is recommended.  Report will be called to the referring physician's office.   LOCATION:  [place]     Dictated by (CST): Alejandro Zaragoza MD on 4/12/2024 at 2:09 PM     Finalized by (CST): Alejandro Zaragoza MD on 4/12/2024 at 2:12 PM           Impression:   Recommendations:  Microhematuria  - microscopic and PCR culture today   - will get CTU to further evaluate renal mass and microhematuria  - cysto scheduled    Thank you very much for this consult. Please call if there are any questions or concerns.     Melissa Lamb PA-C  Urology  [facility]  Phone: [phone]    Date: 4/18/2024  Time: 9:49 AM

## 2024-04-19 ENCOUNTER — APPOINTMENT (OUTPATIENT)
Dept: GENERAL RADIOLOGY | Age: 34
End: 2024-04-19
Attending: NURSE PRACTITIONER
Payer: MEDICAID

## 2024-04-19 ENCOUNTER — HOSPITAL ENCOUNTER (OUTPATIENT)
Age: 34
Discharge: HOME | End: 2024-04-19
Payer: MEDICAID

## 2024-04-19 VITALS
SYSTOLIC BLOOD PRESSURE: 122 MMHG | HEART RATE: 80 BPM | BODY MASS INDEX: 42.7 KG/M2 | DIASTOLIC BLOOD PRESSURE: 67 MMHG | OXYGEN SATURATION: 98 % | RESPIRATION RATE: 18 BRPM | WEIGHT: 241 LBS | TEMPERATURE: 98 F | HEIGHT: 63 IN

## 2024-04-19 DIAGNOSIS — R42 DIZZINESS: Primary | ICD-10-CM

## 2024-04-19 LAB
#MXD IC: 0.7 X10ˆ3/UL (ref 0.1–1)
BUN BLD-MCNC: 7 MG/DL (ref 7–18)
CHLORIDE BLD-SCNC: 104 MMOL/L (ref 98–112)
CO2 BLD-SCNC: 26 MMOL/L (ref 21–32)
CREAT BLD-MCNC: 0.7 MG/DL
DDIMER WHOLE BLOOD: 236 NG/ML DDU (ref ?–400)
EGFRCR SERPLBLD CKD-EPI 2021: 118 ML/MIN/1.73M2 (ref 60–?)
GLUCOSE BLD-MCNC: 120 MG/DL (ref 70–99)
HCT VFR BLD AUTO: 39.8 %
HCT VFR BLD CALC: 40 %
HGB BLD-MCNC: 13 G/DL
ISTAT IONIZED CALCIUM FOR CHEM 8: 1.23 MMOL/L (ref 1.12–1.32)
LYMPHOCYTES # BLD AUTO: 1.8 X10ˆ3/UL (ref 1–4)
LYMPHOCYTES NFR BLD AUTO: 20.7 %
MCH RBC QN AUTO: 29.6 PG (ref 26–34)
MCHC RBC AUTO-ENTMCNC: 32.7 G/DL (ref 31–37)
MCV RBC AUTO: 90.7 FL (ref 80–100)
MIXED CELL %: 7.9 %
NEUTROPHILS # BLD AUTO: 6.1 X10ˆ3/UL (ref 1.5–7.7)
NEUTROPHILS NFR BLD AUTO: 71.4 %
PLATELET # BLD AUTO: 303 X10ˆ3/UL (ref 150–450)
POTASSIUM BLD-SCNC: 3.8 MMOL/L (ref 3.6–5.1)
RBC # BLD AUTO: 4.39 X10ˆ6/UL
SODIUM BLD-SCNC: 141 MMOL/L (ref 136–145)
TROPONIN I BLD-MCNC: <0.02 NG/ML
WBC # BLD AUTO: 8.6 X10ˆ3/UL (ref 4–11)

## 2024-04-19 PROCEDURE — 99214 OFFICE O/P EST MOD 30 MIN: CPT | Performed by: NURSE PRACTITIONER

## 2024-04-19 PROCEDURE — 84484 ASSAY OF TROPONIN QUANT: CPT | Performed by: NURSE PRACTITIONER

## 2024-04-19 PROCEDURE — 80047 BASIC METABLC PNL IONIZED CA: CPT | Performed by: NURSE PRACTITIONER

## 2024-04-19 PROCEDURE — 71046 X-RAY EXAM CHEST 2 VIEWS: CPT | Performed by: NURSE PRACTITIONER

## 2024-04-19 PROCEDURE — 85025 COMPLETE CBC W/AUTO DIFF WBC: CPT | Performed by: NURSE PRACTITIONER

## 2024-04-19 PROCEDURE — 85378 FIBRIN DEGRADE SEMIQUANT: CPT | Performed by: NURSE PRACTITIONER

## 2024-04-19 RX ORDER — SODIUM CHLORIDE 9 MG/ML
1000 INJECTION, SOLUTION INTRAVENOUS ONCE
Status: COMPLETED | OUTPATIENT
Start: 2024-04-19 | End: 2024-04-19

## 2024-04-19 NOTE — DISCHARGE INSTRUCTIONS
Push fluids and rest.  But with your primary care physician and/or neurology in the next 1 to 2 weeks for further evaluation care.  If anytime develop uncontrolled dizziness, headaches, change in vision any numbness weakness or neurodeficits go directly to the ER.

## 2024-04-19 NOTE — ED PROVIDER NOTES
Patient Seen in: [facility]      History     Chief Complaint   Patient presents with    Dizziness     Stated Complaint: LIGHTHEADED/DIZZINESS    Subjective:   34-year-old female presents today with complaints of intermittent dizziness lasting only a couple seconds at a time.  States comes on as fast as it goes away.  Denies any contributing factors.  Denies any dizziness at this time he does have a mild generalized headache.  No change in vision no photosensitivity.  Patient denies any nausea or vomiting.  No chest pain palpitation shortness of breath.  Patient denies any abdominal back pain no urinary symptoms.  Denies any fever chills or bodyaches.  No URI symptoms.  Has had intermittent pain to the left ear.  Symptoms going on for 2 to 3 weeks.  Normal last menses.  States spoke with her neurologist who she sees for MS who told her to come in and make sure her electrolytes are normal as well as not anemic.  Patient is alert oriented x 3.  Denies any recent head or neck injuries.  No other symptoms or concerns.  The patient's medication list, past medical history and social history elements as listed in today's nurse's notes were reviewed and agreed (except as otherwise stated in the HPI).  The patient's family history reviewed and determined to be noncontributory to the presenting problem            Objective:   Past Medical History:    Disorder of thyroid    THYROMEGALLY    Low back pain    MS (multiple sclerosis) (HCC)    Seizure (HCC)    Seizure disorder (HCC)    as a child    Visual impairment    GLASSES              Past Surgical History:   Procedure Laterality Date    Appendectomy  02/14/2018    Appendectomy      Removal gallbladder      2021    Tonsillectomy                  Social History     Socioeconomic History    Marital status: Single   Tobacco Use    Smoking status: Never    Smokeless tobacco: Never    Tobacco comments:     NON-SMOKER   Vaping Use    Vaping status: Former    Substances:  CBD    Devices: Disposable   Substance and Sexual Activity    Alcohol use: Yes     Comment: social     Drug use: Yes     Types: Cannabis     Comment: occasionally   Other Topics Concern    Caffeine Concern No     Comment: tea sometimes    Exercise Yes     Comment: 2x per week              Review of Systems    Positive for stated complaint: LIGHTHEADED/DIZZINESS  Other systems are as noted in HPI.  Constitutional and vital signs reviewed.      All other systems reviewed and negative except as noted above.    Physical Exam     ED Triage Vitals [04/19/24 1059]   /84   Pulse 74   Resp 18   Temp 98.1 °F (36.7 °C)   Temp src Temporal   SpO2 98 %   O2 Device None (Room air)       Current:/67   Pulse 80   Temp 98.1 °F (36.7 °C) (Temporal)   Resp 18   Ht 160 cm (5' 3\")   Wt 109.3 kg   SpO2 98%   BMI 42.69 kg/m²         Physical Exam  Vitals and nursing note reviewed.   Constitutional:       Appearance: She is well-developed.   HENT:      Head: Normocephalic.      Right Ear: Tympanic membrane and ear canal normal.      Left Ear: Tympanic membrane and ear canal normal.      Nose: Nose normal.      Mouth/Throat:      Mouth: Mucous membranes are moist.   Eyes:      Extraocular Movements: Extraocular movements intact.      Conjunctiva/sclera: Conjunctivae normal.      Pupils: Pupils are equal, round, and reactive to light.   Cardiovascular:      Rate and Rhythm: Normal rate and regular rhythm.   Pulmonary:      Effort: Pulmonary effort is normal.      Breath sounds: Normal breath sounds.   Musculoskeletal:      Cervical back: Normal range of motion and neck supple.   Skin:     General: Skin is warm and dry.   Neurological:      General: No focal deficit present.      Mental Status: She is alert and oriented to person, place, and time.      GCS: GCS eye subscore is 4. GCS verbal subscore is 5. GCS motor subscore is 6.      Cranial Nerves: No facial asymmetry.      Sensory: Sensation is intact.      Motor: Motor  function is intact. No weakness or tremor.      Coordination: Coordination is intact. Romberg sign negative. Coordination normal. Finger-Nose-Finger Test and Heel to Shin Test normal.      Gait: Gait is intact.               ED Course     Labs Reviewed   POCT ISTAT CHEM8 CARTRIDGE - Abnormal; Notable for the following components:       Result Value    ISTAT Glucose 120 (*)     All other components within normal limits   D-DIMER (POC) - Normal   ISTAT TROPONIN - Normal   POCT CBC                  XR CHEST PA + LAT CHEST (CPT=71046)    Result Date: 4/19/2024  PROCEDURE:  XR CHEST PA + LAT CHEST (CPT=71046)  INDICATIONS:  LIGHTHEADED/DIZZINESS  COMPARISON:  None.  TECHNIQUE:  PA and lateral chest radiographs were obtained.  PATIENT STATED HISTORY: (As transcribed by Technologist)  Patient with dizziness and lightheadness for about 3 weeks, history of multiple sclerosis.    FINDINGS:  LUNGS:  No focal consolidation.  Normal vascularity. CARDIAC:  Normal size cardiac silhouette. MEDIASTINUM:  Normal. PLEURA:  No pneumothorax.  No pleural effusions. BONES:  Normal for age.            CONCLUSION:  No acute cardiopulmonary process.   LOCATION:  [facility]   Dictated by (CST): Lou Byrnes MD on 4/19/2024 at 12:00 PM     Finalized by (CST): Lou Byrnes MD on 4/19/2024 at 12:01 PM         Please note that this report has been produced using speech recognition software and may contain errors related to that system including, but not limited to, errors in grammar, punctuation, and spelling, as well as words and phrases that possibly may have been recognized inappropriately.  If there are any questions or concerns, contact the dictating provider for clarification.        Note to patient: The 21st Century Cures Act makes medical notes like these available to patients in the interest of transparency. However, this is a medical document intended as peer to peer communication. It is written in medical language and may contain  abbreviations or verbiage that are unfamiliar. It may appear blunt or direct. Medical documents are intended to carry relevant information, facts as evident, and the clinical opinion of the practitioner.                                   Medical Decision Making  Differential diagnosis includes but is not limited to: Vertigo, anemia, electrolyte imbalance, shoulder dislocation, dissection, aneurysm        Presents today with complaints of intermittent which she describes as dizziness over the last 2 to 3 weeks.  Symptoms last for only a couple seconds and then are gone.  Denies any contributing factors.  States happens at different points of the day with different type of activities or just sitting watching TV.  Patient denies any headaches, change in vision, no neurodeficits.  Neuroexam was intact.  Patient does have a history of MS spoke with her neurologist who told her to be evaluated at the immediate care.  IV was established 0.9 normal saline IV fluid bolus was started.  CBC i-STAT both showed no acute findings.  Troponin D-dimer both done and negative.  Orthostatics were within normal limits.  Chest x-ray showed no consolidation or acute findings.  Unclear cause of dizziness at this time.  Patient did not have any dizzy episodes while here.  Encourage patient to follow-up with her neurologist for further evaluation.  May also follow-up with her primary care physician.  Encouraged to push fluids rest not to drive operate heavy machinery or get on high places while having these type of symptoms.  Patient verbalized understanding and agreed to plan of care.  Did discuss patient with Dr. Almeida, attending of the IC today, who agrees with plan of care.    Amount and/or Complexity of Data Reviewed  Labs: ordered.     Details: CBC  I-STAT  Troponin  D-dimer  Radiology: ordered and independent interpretation performed. Decision-making details documented in ED Course.     Details: Chest x-ray    Risk  OTC  drugs.        Disposition and Plan     Clinical Impression:  1. Dizziness         Disposition:  Discharge  4/19/2024 12:38 pm    Follow-up:  Alize Moeller MD  [address] [address] [phone]    In 1 week  for Nicky Hampton DO  [address] [place] [address] [id number] [phone]    Schedule an appointment as soon as possible for a visit             Medications Prescribed:  Discharge Medication List as of 4/19/2024 12:42 PM

## 2024-04-23 ENCOUNTER — TELEPHONE (OUTPATIENT)
Dept: SURGERY | Facility: CLINIC | Age: 34
End: 2024-04-23

## 2024-04-23 NOTE — TELEPHONE ENCOUNTER
E. Coli <10k. Will monitor for now since asx.     Augmetin>fosfomycin>levaquin>macrobid>tetracycline

## 2024-04-24 ENCOUNTER — APPOINTMENT (OUTPATIENT)
Dept: PHYSICAL THERAPY | Facility: HOSPITAL | Age: 34
End: 2024-04-24
Attending: Other
Payer: MEDICAID

## 2024-04-26 ENCOUNTER — OFFICE VISIT (OUTPATIENT)
Dept: PHYSICAL THERAPY | Facility: HOSPITAL | Age: 34
End: 2024-04-26
Attending: Other
Payer: MEDICAID

## 2024-04-26 DIAGNOSIS — M54.16 LUMBAR RADICULOPATHY: Primary | ICD-10-CM

## 2024-04-26 PROCEDURE — 97162 PT EVAL MOD COMPLEX 30 MIN: CPT

## 2024-04-26 NOTE — PROGRESS NOTES
SPINE EVALUATION:     Diagnosis:   Lumbar radiculopathy (M54.16)       Referring Provider: Nicky Ochoa  Date of Evaluation: 2024    Precautions:  None Next MD visit: none scheduled  Date of Surgery: n/a     PATIENT SUMMARY   Negar Gaona is a 34 year old female who presents to therapy today with complaints of low back pain which has been present since high school, with a recent flare up of severe back pain in March. She states that she had prior PT for pain in the back on the R side in , she went to 2 visits. The left side started hurting in March. She states that she had severe pain and couldn't walk, she went to [facility]. The patient states that with her history of back pain since high school she has always been able to deal with it. She has never had trauma or an injury, but she played softball. She reports that leg pain started last year. She denies weakness, but reports bilateral hand numbness which she has been told is due to her MS.     Description of Symptoms:   P1: stabbing/pinching pain on L side low back, radiates to R side.   P2: phantom pain along VEE lateral thighs across anterior knee  P3: R calf pain  P4: intermittent diminished sensation at left anterior/inferior knee  P5: occasional headaches 1x every 2 weeks, posterior aspect of the head and into R orbital area  P6: neck pain lower cervical spine into upper thoracic  P7: numbness bilateral hands  Aggravating Factors:   P1: sometimes sitting, sometimes standing, occasionally laying down. Laying supine= instantly painful  P2: sitting  Relieving Factors:   P1: laying on side, R/L. Gets better instantly  P2: pain goes away on its own, if she stands up it feels like her leg is asleep  24 hour pattern: worse in AM  Sleep: the patient does not sleep well, she states that she needs a new mattress. She wakes up but then is able to fall back asleep.  Imaging:   EM.  The right median sensory response is normal.   However, when a right median to ulnar palmar comparison sensory study was performed, there was a difference in latency of 0.5 ms, with the median response being prolonged.  2.  The left median sensory response was normal.  3.  The bilateral ulnar and right radial sensory responses were normal.  4.  The bilateral sural sensory responses were normal.  5.  The bilateral median motor responses were normal.  6.  The bilateral ulnar motor responses were normal.  7.  The bilateral tibial motor responses were normal.  8.  The right peroneal motor response was normal.  9.  Needle EMG using a concentric needle was performed on selected muscles of the right upper and lower extremities, as well as the right lumbar paraspinal muscles.  There was increased insertional activity seen in the right paraspinal muscles.  There was occasional fasciculations and mild motor unit remodeling seen in the right gastrocnemius.  Conclusion:  1.  There is evidence of a chronic right S1 lumbosacral radiculopathy.  2.  There is evidence of a very mild right median compressive mononeuropathy at the wrist.  3.  There is no evidence of a right cervical radiculopathy.  4.  There is no evidence of a polyneuropathy.  Lumbar spine x-ray 2023  BONES:  There is normal alignment without spondylolisthesis.  No spondylolysis.  Vertebral body height maintained.  Mild disc space narrowing involves the lower thoracic and lower lumbar spine is most consistent with mild degenerative disc disease.    Lower lumbar facet joint arthropathy.   PARASPINOUS:  No paraspinous abnormality is seen.   OTHER:  Status post cholecystectomy.   Thoracic MRI 2023  CONCLUSION:    1. Unremarkable MR appearance of the thoracic cord.   2. There are mildly prominent disc protrusions at T6-T7 and T11-T12.  There is mild spinal canal stenosis at T11-T12.   Cervical MRI 2023  CONCLUSION:    1. Mild degenerative changes most pronounced at C5-C6     Pt describes pain level current 6/10, at  best 4/10, at worst 10/10.   Current functional limitations include laying supine, sitting, standing.     Negar describes prior level of function history of back pain since high school, worsening recently. Pt goals include help with back pain.  Past medical history was reviewed with Negar. Significant findings include    has a past medical history of Disorder of thyroid, Low back pain, MS (multiple sclerosis) (HCC), Seizure (HCC), Seizure disorder ([facility]), and Visual impairment.    Pt denies diabetes, falls/balance issues, weakness bowel/bladder changes, saddle anesthesia, and VEE LE N/T. The patient does report  weakness, states that she sometimes drops things.    ASSESSMENT  Negar presents to physical therapy evaluation with primary c/o low back pain more on L side, . The results of the objective tests and measures show impaired lumbar mobility in the lower lumbar spine (noted hinging in upper lumbar spine), pain most with R lateral flexion, neuro exam was normal with no UMN signs.  Functional deficits include but are not limited to laying supine, sitting, standing.  Signs and symptoms are consistent with diagnosis of lumbar facet dysfunction. Pt and PT discussed evaluation findings, pathology, POC and HEP.  Pt voiced understanding and performs HEP correctly without reported pain. Skilled Physical Therapy is medically necessary to address the above impairments and reach functional goals.     OBJECTIVE:   Observation/Posture: noted decreased lumbar lordosis in standing.   Neuro Screen: reflexes 2+, babinski/clonus/nguyen's/inverted supinator (-). Sensation: unclear results, pt reports some areas of diminished sensation compared to face in lower legs, however this sensation is equal VEE and throughout lower legs. Myotomal strength 5/5 VEE    Lumbar AROM: (* denotes performed with pain)  Flexion: WNL, no pain with OP  Extension: 15, noted minimal movement occurring at lower lumbar spine, increased pain with  OP  Sidebending: R limited 6/10 pain; L WNL  Rotation: R WNL; L WNL no pain with or without hip blocking    Accessory motion: PAIVM: noted hypomobility in lower lumbar spine most at L5 with CPA, mild hypomobility at L4. UPA is painful and hypomobile at L5-S1 and L4-5 VEE.   Palpation: there is palpable tenderness to QL bilaterally    Strength: (* denotes performed with pain)  LE   Hip flexion (L2): R 5/5; L 5/5  Hip abduction: R NT/5; L NT/5  Knee Flexion: R 5/5; L 5/5   Knee extension (L3): R 5/5; L 5/5   DF (L4): R 5/5; L 5/5  Great Toe Ext (L5): R 5/5, L 5/5  Eversion (S1): R 5/5; L 5/5       Gait: pt ambulates on level ground with normal mechanics.    Today’s Treatment and Response:   Pt education was provided on exam findings, treatment diagnosis, treatment plan, expectations, and prognosis. Pt was also provided recommendations for activity modifications, possible soreness after evaluation, postural corrections, and importance of remaining active  Patient was instructed in and issued a HEP for:   Access Code: [email or web address]  URL: [email or web address]  Date: 04/29/2024  Prepared by: Nishi Gan  - Sidelying Thoracic Rotation with Open Book  - 2 x daily - 7 x weekly - 1 sets - 10 reps    Charges: PT Eval Moderate Complexity      Total Timed Treatment: 5 min     Total Treatment Time: 30 min     Based on clinical rationale and outcome measures, this evaluation involved Moderate Complexity decision making due to 3+ personal factors/comorbidities, 4+ body structures involved/activity limitations, and evolving symptoms including changing pain levels.  PLAN OF CARE:    Goals: (to be met in 9 visits)   The patient will demonstrate lumbar extension AROM to 20 deg in pain free range  The patient will demonstrate pain free R lateral flexion AROM  The patient will report being able to sit for 1 hour without limitation due to back pain  The patient will report being able to sleep through the night with no back pain  waking her up  The patient will be independent and adherent in a comprehensive HEP    Frequency / Duration: Patient will be seen for 1-2 x/week or a total of 9 visits over a 90 day period. Treatment will include: Gait training, Manual Therapy, Neuromuscular Re-education, Self-Care Home Management, Therapeutic Activities, Therapeutic Exercise, and Home Exercise Program instruction    Education or treatment limitation: None  Rehab Potential:good    Oswestry Disability Index Score  Score: 20 % (4/25/2024 10:50 AM)      Patient/Family/Caregiver was advised of these findings, precautions, and treatment options and has agreed to actively participate in planning and for this course of care.    Thank you for your referral. Please co-sign or sign and return this letter via fax as soon as possible to [phone]. If you have any questions, please contact me at Dept: [phone]    Sincerely,  Electronically signed by therapist: Nishi Hamilton PT    Physician's certification required: Yes  I certify the need for these services furnished under this plan of treatment and while under my care.    X___________________________________________________ Date____________________    Certification From: 4/26/2024  To:7/25/2024

## 2024-04-29 ENCOUNTER — OFFICE VISIT (OUTPATIENT)
Dept: PHYSICAL THERAPY | Facility: HOSPITAL | Age: 34
End: 2024-04-29
Attending: Other
Payer: MEDICAID

## 2024-04-29 PROCEDURE — 97140 MANUAL THERAPY 1/> REGIONS: CPT

## 2024-04-29 PROCEDURE — 97110 THERAPEUTIC EXERCISES: CPT

## 2024-04-29 NOTE — PROGRESS NOTES
Diagnosis:   Lumbar radiculopathy (M54.16)       Referring Provider: Nicky Ochoa  Date of Evaluation:  4/26/2024    Precautions:  None Next MD visit: none scheduled  Date of Surgery: n/a   Insurance Primary/Secondary: BLUE CROSS MEDICAID / N/A     # Auth Visits: 9 AUTH            Subjective: The patient reports that work ran late, she is a . If her back starts hurting she gets out of the truck to walk around a bit. Her back doesn't hurt much with siting in the truck, so she can sit for a longer amount of time. She states that she has no leg pain today. The phantom pain hasn't been there for about 5 weeks, but she does report some R calf soreness, it feels like she pulled a muscle.     Pain: 4/10      Objective:     Extension L 5/10 , after L5 CPA: no increase, 2/10 at rest      Assessment: This was a shortened session due to the patient arriving 20 min late. the patient continues to have low back pain, however she has reported improvements in her leg pain in the last few weeks. Focused treatment on the lumbar spine and Negar had a reduction in pain with lumbar extension following this. Added further mobility into rotation and a strengthening exercise into extension to improve tolerance for this.       Goals:   (to be met in 9 visits)   The patient will demonstrate lumbar extension AROM to 20 deg in pain free range  The patient will demonstrate pain free R lateral flexion AROM  The patient will report being able to sit for 1 hour without limitation due to back pain  The patient will report being able to sleep through the night with no back pain waking her up  The patient will be independent and adherent in a comprehensive HEP    Plan: shon hip abductor MMT  Date: 4/29/2024  TX#: 2/9 Date:                 TX#: 3/ Date:                 TX#: 4/ Date:                 TX#: 5/ Date:   Tx#: 6/   Manual therapy  L5 CPA grade III+  L5-S1 UPA grade III+ on L  X10 min       Therapeutic exercise  Review  sidelying rotaiton 10x ea  LTR 10x  Bridge 2x6       X       X       HEP:   Access Code: [email or web address]  URL: [email or web address]  Date: 04/29/2024  Prepared by: Nishi Gan  - Sidelying Thoracic Rotation with Open Book  - 2 x daily - 7 x weekly - 1 sets - 10 reps    Charges: manual therapy x1, therapeutic exercise x1       Total Timed Treatment: 25 min  Total Treatment Time: 25 min

## 2024-05-01 ENCOUNTER — OFFICE VISIT (OUTPATIENT)
Dept: PHYSICAL THERAPY | Facility: HOSPITAL | Age: 34
End: 2024-05-01
Attending: Other
Payer: MEDICAID

## 2024-05-01 ENCOUNTER — HOSPITAL ENCOUNTER (OUTPATIENT)
Dept: CT IMAGING | Facility: HOSPITAL | Age: 34
Discharge: HOME | End: 2024-05-01
Payer: MEDICAID

## 2024-05-01 DIAGNOSIS — N28.89 RENAL MASS: ICD-10-CM

## 2024-05-01 DIAGNOSIS — R31.29 MICROSCOPIC HEMATURIA: ICD-10-CM

## 2024-05-01 PROCEDURE — 97140 MANUAL THERAPY 1/> REGIONS: CPT

## 2024-05-01 PROCEDURE — 74178 CT ABD&PLV WO CNTR FLWD CNTR: CPT

## 2024-05-01 PROCEDURE — 97110 THERAPEUTIC EXERCISES: CPT

## 2024-05-01 PROCEDURE — 76377 3D RENDER W/INTRP POSTPROCES: CPT

## 2024-05-01 NOTE — PROGRESS NOTES
Diagnosis:   Lumbar radiculopathy (M54.16)       Referring Provider: Nicky Ochoa  Date of Evaluation:  4/26/2024    Precautions:  None Next MD visit: none scheduled  Date of Surgery: n/a   Insurance Primary/Secondary: BLUE CROSS MEDICAID / N/A     # Auth Visits: 9 AUTH            Subjective: The patient states that she felt good after the last session. She has spasms on left side while washing dishes.     Pain: 2/10      Objective:     Extension L no pain       Kalli sign: non tender  Palpable pulses in calf VEE  Slump: (-) for reproduction of calf pain, or back pain.     Hip abduction: 4+/5 VEE      Assessment: The patient has noted some improvements in her back pain since starting physical therapy. Recommended that she put her foot in the cabinet while washing dishes to give short term relief of pain with this activity, and strengthening should give longer term relief. The patient had some reproduction of this back pain while performing the hip extension in modified plank on mat table. Modified this to reduce amount of trunk flexion, and this pain subsided. R calf pain was not reproduced with neurodynamics, did perform palpation assessment, kalli's test to ensure that this is not a vascular etiology. This testing was negative. Will continue with interventions to lumbar spine to improve mobility and strength.       Goals:   (to be met in 9 visits)   The patient will demonstrate lumbar extension AROM to 20 deg in pain free range  The patient will demonstrate pain free R lateral flexion AROM  The patient will report being able to sit for 1 hour without limitation due to back pain  The patient will report being able to sleep through the night with no back pain waking her up  The patient will be independent and adherent in a comprehensive HEP    Plan: strengthening to lumbar spine  Date: 4/29/2024  TX#: 2/9 Date: 5/1/2024                TX#: 3/9 Date:                 TX#: 4/ Date:                 TX#: 5/ Date:    Tx#: 6/   Manual therapy  L5 CPA grade III+  L5-S1 UPA grade III+ on L  X10 min Manual therapy  L5 CPA grade III+  L5-S1 UPA grade III+ VEE  X9 min      Therapeutic exercise  Review sidelying rotaiton 10x ea  LTR 10x  Bridge 2x6 Therapeutic exercise  Cat cow 10x  LTR 10x   Bridge 2x8  Standing lean on high mat table, hip extension 2x10  Quadriped hip extension 2x5 ea      X X      X X      HEP:   Access Code: [email or web address]  URL: [email or web address]  Date: 05/01/2024  Prepared by: Nishi    Exercises  - Sidelying Thoracic Rotation with Open Book  - 2 x daily - 7 x weekly - 1 sets - 10 reps  - Cat Cow  - 1 x daily - 7 x weekly - 1 sets - 10 reps  - Quadruped Leg Extension with Resistance  - 1 x daily - 7 x weekly - 3 sets - 5 reps    Charges: manual therapy x1, therapeutic exercise x2       Total Timed Treatment: 45 min  Total Treatment Time: 45 min

## 2024-05-02 NOTE — PROGRESS NOTES
Discussed on phone- no concerns for renal mass but given ongoing microhematuria without convincing UTI on PCR culture. Will complete entire course of abx and plan for cysto next wk.

## 2024-05-06 ENCOUNTER — APPOINTMENT (OUTPATIENT)
Dept: PHYSICAL THERAPY | Facility: HOSPITAL | Age: 34
End: 2024-05-06
Attending: Other
Payer: MEDICAID

## 2024-05-06 ENCOUNTER — TELEPHONE (OUTPATIENT)
Dept: PHYSICAL THERAPY | Facility: HOSPITAL | Age: 34
End: 2024-05-06

## 2024-05-07 ENCOUNTER — TELEPHONE (OUTPATIENT)
Dept: PHYSICAL THERAPY | Facility: HOSPITAL | Age: 34
End: 2024-05-07

## 2024-05-08 ENCOUNTER — TELEPHONE (OUTPATIENT)
Dept: PHYSICAL THERAPY | Facility: HOSPITAL | Age: 34
End: 2024-05-08

## 2024-05-08 ENCOUNTER — APPOINTMENT (OUTPATIENT)
Dept: PHYSICAL THERAPY | Facility: HOSPITAL | Age: 34
End: 2024-05-08
Attending: Other
Payer: MEDICAID

## 2024-05-09 ENCOUNTER — OFFICE VISIT (OUTPATIENT)
Dept: PHYSICAL THERAPY | Facility: HOSPITAL | Age: 34
End: 2024-05-09
Attending: Other
Payer: MEDICAID

## 2024-05-09 ENCOUNTER — PROCEDURE (OUTPATIENT)
Dept: SURGERY | Facility: CLINIC | Age: 34
End: 2024-05-09
Payer: MEDICAID

## 2024-05-09 VITALS — DIASTOLIC BLOOD PRESSURE: 75 MMHG | SYSTOLIC BLOOD PRESSURE: 116 MMHG

## 2024-05-09 DIAGNOSIS — N28.89 RENAL MASS: ICD-10-CM

## 2024-05-09 DIAGNOSIS — R82.90 URINE FINDING: Primary | ICD-10-CM

## 2024-05-09 DIAGNOSIS — R31.29 HEMATURIA, MICROSCOPIC: ICD-10-CM

## 2024-05-09 LAB
APPEARANCE: CLEAR
BILIRUBIN: NEGATIVE
GLUCOSE (URINE DIPSTICK): NEGATIVE MG/DL
KETONES (URINE DIPSTICK): NEGATIVE MG/DL
MULTISTIX LOT#: ABNORMAL NUMERIC
NITRITE, URINE: NEGATIVE
PH, URINE: 7 (ref 4.5–8)
PROTEIN (URINE DIPSTICK): NEGATIVE MG/DL
SPECIFIC GRAVITY: 1.01 (ref 1–1.03)
URINE-COLOR: YELLOW
UROBILINOGEN,SEMI-QN: 0.2 MG/DL (ref 0–1.9)

## 2024-05-09 PROCEDURE — 52000 CYSTOURETHROSCOPY: CPT | Performed by: SURGERY

## 2024-05-09 PROCEDURE — 97140 MANUAL THERAPY 1/> REGIONS: CPT

## 2024-05-09 PROCEDURE — 97110 THERAPEUTIC EXERCISES: CPT

## 2024-05-09 PROCEDURE — 81003 URINALYSIS AUTO W/O SCOPE: CPT | Performed by: SURGERY

## 2024-05-09 RX ORDER — CIPROFLOXACIN 500 MG/1
500 TABLET, FILM COATED ORAL ONCE
Status: COMPLETED | OUTPATIENT
Start: 2024-05-09 | End: 2024-05-09

## 2024-05-09 RX ADMIN — CIPROFLOXACIN 500 MG: 500 TABLET, FILM COATED ORAL at 09:20:00

## 2024-05-09 NOTE — PROGRESS NOTES
Diagnosis:   Lumbar radiculopathy (M54.16)       Referring Provider: Nicky Ochoa  Date of Evaluation:  4/26/2024    Precautions:  None Next MD visit: none scheduled  Date of Surgery: n/a   Insurance Primary/Secondary: BLUE CROSS MEDICAID / N/A     # Auth Visits: 9 AUTH            Subjective: The patient reports that she feels good today, but she had some pain yesterday wiht sitting in the truck or standing. She feels that this may be related to her lack of sleep from her work schedule. She felt ok after the last session.     Pain: 4/10, 7/10 with extension.      Objective:     Extension 7/10 pain with extension  No pain with extension after CPA      Emmanuel sign: non tender  Palpable pulses in calf VEE  Slump: (-) for reproduction of calf pain, or back pain.     Hip abduction: 4+/5 VEE      Assessment: The patient had improvements in lumbar spine mobility and a decrease in pain with extension following PAIVM mobilizations. Continued with lumbar mobility/stretching and progressed this to supine rotation. Performed a strength progression including bridging and resisted 3 way hip to improve strength.       Goals:   (to be met in 9 visits)  progressing  The patient will demonstrate lumbar extension AROM to 20 deg in pain free range  The patient will demonstrate pain free R lateral flexion AROM  The patient will report being able to sit for 1 hour without limitation due to back pain  The patient will report being able to sleep through the night with no back pain waking her up  The patient will be independent and adherent in a comprehensive HEP    Plan: strengthening to lumbar spine  Date: 4/29/2024  TX#: 2/9 Date: 5/1/2024                TX#: 3/9 Date: 5/9/2024                TX#: 4/9 Date:                 TX#: 5/ Date:   Tx#: 6/   Manual therapy  L5 CPA grade III+  L5-S1 UPA grade III+ on L  X10 min Manual therapy  L5 CPA grade III+  L5-S1 UPA grade III+ VEE  X9 min Manual therapy  L5 CPA grade III+  X2x4 min      Therapeutic exercise  Review sidelying rotaiton 10x ea  LTR 10x  Bridge 2x6 Therapeutic exercise  Cat cow 10x  LTR 10x   Bridge 2x8  Standing lean on high mat table, hip extension 2x10  Quadriped hip extension 2x5 ea Therapeutic exercise  Cat cow 10x  Child pose 2x30 sec  Supine lumbar rotation stretching 3x10 sec ea  Bridge march 2x6  Quadriped hip extension 6x with abdominal contraction  Standing hip flexion/extension red TB 2x6 ea  Standing hip abduction 2x8 ea red TB     X X X     X X X     HEP:   Access Code: [email or web address]  URL: [email or web address]  Date: 05/01/2024  Prepared by: Nishi    Exercises  - Sidelying Thoracic Rotation with Open Book  - 2 x daily - 7 x weekly - 1 sets - 10 reps  - Cat Cow  - 1 x daily - 7 x weekly - 1 sets - 10 reps  - Quadruped Leg Extension with Resistance  - 1 x daily - 7 x weekly - 3 sets - 5 reps    Charges: manual therapy x1, therapeutic exercise x2       Total Timed Treatment: 44 min  Total Treatment Time: 44 min

## 2024-05-09 NOTE — PROCEDURES
Clinic Procedure Note    INDICATIONS:   Negar Gaona is a 34 year old female with history of MS, thyromegaly who presented for microscopic hematuria.  Renal ultrasound showed a possible 4.5 cm suspicious left renal mass.  CT urogram did not show this renal mass, though it was a bit suspicious looking on the renal ultrasound so to be safe I will order a abdominal MRI to ensure there is no subtle renal mass in this area.    PROCEDURE:       1. Flexible cystourethroscopy    DATE OF PROCEDURE: 2024     PRE-PROCEDURE DIAGNOSIS: Microscopic hematuria    POST-PROCEDURE DIAGNOSIS: Same     SURGEON: Hollis Edmondson MD    FINDINGS:    Urethra: Normal caliber urethra throughout without lesions    Bladder: Normal mucosa with no papillary lesions or erythema, no trabeculation, mild trigonitis    Ureteral orifices: Orthotopic    Other findings: None    PROCEDURE:   Patient was brought to the procedure suite and a time-out was performed identifiying the patient,  and procedure to be performed. The risks and benefits of the procedure were once again discussed with the patient including bleeding, infection, and dysuria. The patient agreed to proceed. The patient did not have any signs or symptoms of active UTI.    She was placed in supine position on the table with legs to the sides and the genital area was prepped and draped in the standard sterile fashion. A flexible cystoscope was inserted per urethra. There were no urethral strictures present. The bladder was fully inspected (including retroflexion) and showed findings as above. Both ureteral orifices were orthotopic. The scope was then carefully removed and once again no urethral abnormalities were noted.    There were no complications and the patient tolerated the procedure well.    IMPRESSION:  Negar Gaona is a 34 year old female with history of MS, thyromegaly who presented for microscopic hematuria.  Renal ultrasound showed a possible 4.5 cm  suspicious left renal mass.  CT urogram did not show this renal mass, though it was a bit suspicious looking on the renal ultrasound so to be safe I will order a abdominal MRI to ensure there is no subtle renal mass in this area.    Normal cystoscopy today to complete microscopic materia workup.    PLAN:   -Abdominal MRI to ensure no subtle renal mass present      Hollis Edmondson MD  Staff Urologist  [facility]  Office: [phone]

## 2024-05-27 ENCOUNTER — PATIENT MESSAGE (OUTPATIENT)
Dept: FAMILY MEDICINE CLINIC | Facility: CLINIC | Age: 34
End: 2024-05-27

## 2024-05-27 DIAGNOSIS — M79.671 RIGHT FOOT PAIN: Primary | ICD-10-CM

## 2024-05-28 NOTE — TELEPHONE ENCOUNTER
From: Negar Gaona  To: Alize Moeller  Sent: 5/27/2024 5:25 PM CDT  Subject: Hello    Hi I have been having issues with my right foot. It hurts to walk on it. I feel like a sharp pain shoot through the right edge to my heel. I wasn’t sure if going to urgent care would help getting it checked or emergency room? It been happening since Saturday and I thought it was cause I did a lot of walking that day but after I rested with minimum walking the last two days it still hurts.

## 2024-05-30 ENCOUNTER — PATIENT MESSAGE (OUTPATIENT)
Dept: FAMILY MEDICINE CLINIC | Facility: CLINIC | Age: 34
End: 2024-05-30

## 2024-05-30 NOTE — TELEPHONE ENCOUNTER
From: Negar Gaona  To: Alize Moeller  Sent: 5/30/2024 1:43 PM CDT  Subject: Hello    I wanted to know if you could tell me when my last Depo shot was? Am I due for another one?

## 2024-05-31 ENCOUNTER — HOSPITAL ENCOUNTER (OUTPATIENT)
Age: 34
Discharge: HOME | End: 2024-05-31
Payer: MEDICAID

## 2024-05-31 ENCOUNTER — APPOINTMENT (OUTPATIENT)
Dept: GENERAL RADIOLOGY | Age: 34
End: 2024-05-31
Attending: NURSE PRACTITIONER
Payer: MEDICAID

## 2024-05-31 VITALS
OXYGEN SATURATION: 97 % | WEIGHT: 240 LBS | TEMPERATURE: 97 F | BODY MASS INDEX: 42.52 KG/M2 | DIASTOLIC BLOOD PRESSURE: 69 MMHG | SYSTOLIC BLOOD PRESSURE: 111 MMHG | HEART RATE: 66 BPM | RESPIRATION RATE: 18 BRPM | HEIGHT: 63 IN

## 2024-05-31 DIAGNOSIS — M79.671 RIGHT FOOT PAIN: Primary | ICD-10-CM

## 2024-05-31 PROCEDURE — 99213 OFFICE O/P EST LOW 20 MIN: CPT | Performed by: NURSE PRACTITIONER

## 2024-05-31 PROCEDURE — 73630 X-RAY EXAM OF FOOT: CPT | Performed by: NURSE PRACTITIONER

## 2024-05-31 PROCEDURE — L3260 AMBULATORY SURGICAL BOOT EAC: HCPCS | Performed by: NURSE PRACTITIONER

## 2024-05-31 RX ORDER — NAPROXEN 500 MG/1
500 TABLET ORAL 2 TIMES DAILY PRN
Qty: 14 TABLET | Refills: 0 | Status: SHIPPED | OUTPATIENT
Start: 2024-05-31 | End: 2024-06-07

## 2024-05-31 NOTE — ED INITIAL ASSESSMENT (HPI)
Pt sts pain to right foot began 6 days ago after walking a lot at an amusement[place]water park that day. No known injury. Unable to fully weight bear- limping at times. Sts pain is along lateral aspect of foot from 5th toe to heel.

## 2024-05-31 NOTE — ED PROVIDER NOTES
Patient Seen in: [facility]      History     Chief Complaint   Patient presents with    Leg or Foot Injury     Stated Complaint: foot pain    Subjective:   HPI    34-year-old female presents to the me care with right foot pain.  Pain is to the outside of the right foot pay states she was recently at an amusement park/4 proximal walking thinks aggravated the foot.  Patient she has pain when she bears weight.  Denies any numbness or ting.  Patient has no other issues complaints or concerns.  The patient's medication list, past medical history and social history elements as listed in today's nurse's notes were reviewed and agreed (except as otherwise stated in the HPI).  The patient's family history reviewed and determined to be noncontributory to the presenting problem.      Objective:   Past Medical History:    Disorder of thyroid    THYROMEGALLY    Low back pain    MS (multiple sclerosis) (HCC)    Seizure (HCC)    Seizure disorder (HCC)    as a child    Visual impairment    GLASSES              Past Surgical History:   Procedure Laterality Date    Appendectomy  02/14/2018    Appendectomy      Removal gallbladder      2021    Tonsillectomy                  No pertinent social history.            Review of Systems    Positive for stated complaint: foot pain  Other systems are as noted in HPI.  Constitutional and vital signs reviewed.      All other systems reviewed and negative except as noted above.    Physical Exam     ED Triage Vitals [05/31/24 1413]   /69   Pulse 66   Resp 18   Temp 97.1 °F (36.2 °C)   Temp src Temporal   SpO2 97 %   O2 Device None (Room air)       Current Vitals:   Vital Signs  BP: 111/69  Pulse: 66  Resp: 18  Temp: 97.1 °F (36.2 °C)  Temp src: Temporal    Oxygen Therapy  SpO2: 97 %  O2 Device: None (Room air)            Physical Exam    GENERAL: well developed, well nourished,in no apparent distress  LUNGS: No respiratory distress  CARDIO: No tachycardia  EXTREMITIES:   R foot  exam:   - defect/deformity : no   - swelling/effusion: no   - bruising/ecchymosis: no   - Tenderness: yes is noted along the lateral and dorsal aspect of the fifth metatarsal  -  Range of motion limited: no   - joint laxity: normal   - crepitus: no   - pedal pulses: Intact  - sensations: intact  - ankle joint: normal exam      ED Course   Labs Reviewed - No data to display  XR FOOT, COMPLETE (MIN 3 VIEWS), RIGHT (CPT=73630)    Result Date: 5/31/2024  PROCEDURE:  XR FOOT, COMPLETE (MIN 3 VIEWS), RIGHT (CPT=73630)  TECHNIQUE:  AP, oblique, and lateral views were obtained.  COMPARISON:  None.  INDICATIONS:  foot pain  PATIENT STATED HISTORY: (As transcribed by Technologist)  Patient is having right foot pain for a week. Pain is with movement and when using the foot along 5th toe and metatarsal. No injury, spent a day walking at amusement park a week ago.    FINDINGS:  BONES:  Normal.  No significant arthropathy or acute abnormality. SOFT TISSUES:  Negative.  No visible soft tissue swelling. EFFUSION:  None visible. OTHER:  Negative.            CONCLUSION:  No acute fracture or dislocation.   LOCATION:  [facility]   Dictated by (CST): Alen Sandoval MD on 5/31/2024 at 2:54 PM     Finalized by (CST): Alen Sandoval MD on 5/31/2024 at 2:54 PM                    [facility]   Patient presenting to the [facility] with isolated injury documented above.  x-rays negative for acute fracture or dislocation. Patient's pain has improved with medications and  has no signs of neurovascular compromise or compartment syndrome.  I adressed with the patient the possibly of more significant ligamentous/soft tissue injury which will not be definitely identified at this time may require further outpatient evaluation including possible MRI especially if the symptoms persist thus advised on R ICE, postop shoe applied and will DC to follow-up with orthopedics as well as primary care provider for reevaluation and further imaging if indicated.  Prescription for  analgesics given.  Please note that this report has been produced using speech recognition software and may contain errors related to that system including, but not limited to, errors in grammar, punctuation, and spelling, as well as words and phrases that possibly may have been recognized inappropriately.  If there are any questions or concerns, contact the dictating provider for clarification.                                 Medical Decision Making      Disposition and Plan     Clinical Impression:  1. Right foot pain         Disposition:  Discharge  5/31/2024  2:59 pm    Follow-up:  Alize Moeller MD  [address] [id number] [phone]          Serena Ding, DPM  [address] [place] [address] [id number] [phone]                Medications Prescribed:  Discharge Medication List as of 5/31/2024  3:01 PM        START taking these medications    Details   naproxen 500 MG Oral Tab Take 1 tablet (500 mg total) by mouth 2 (two) times daily as needed., Normal, Disp-14 tablet, R-0

## 2024-05-31 NOTE — DISCHARGE INSTRUCTIONS
Follow-up with your primary care provider for all of your healthcare needs  Also follow-up with podiatry if symptoms do not improve  Take naproxen twice daily for 7 days  Wear the postop shoe for support and comfort  Ice to the foot ice 20 minutes on every couple hours  Return to the emergency room for symptoms or concerns

## 2024-06-01 NOTE — TELEPHONE ENCOUNTER
I went to the immediate care and they did an xray. They couldn’t find anything and just prescribed me naproxen and a brace on my foot to wear. The only thing the concerns me is the dr mention seeing a callus and said it was an old wound around the area where it hurts without asking if I got a wound. I never injured myself at all on my foot to have a scar turn into a callus. I am not sure if they even wanted to try other tests to see if it could be something else. He just wanted to say that it cause I walk to much even though I have hardly been walking since the pain started.

## 2024-06-05 NOTE — TELEPHONE ENCOUNTER
Patient scheduled for   Future Appointments                                             6/13/2024  2:00 PM [facility] NURSE [facility]                                               Patient requesting depo be refilled through     [id number]  [place], [place] - [address] AT [facility] [address]

## 2024-06-07 ENCOUNTER — APPOINTMENT (OUTPATIENT)
Dept: PHYSICAL THERAPY | Facility: HOSPITAL | Age: 34
End: 2024-06-07
Attending: Other
Payer: MEDICAID

## 2024-06-07 ENCOUNTER — TELEPHONE (OUTPATIENT)
Dept: PHYSICAL THERAPY | Facility: HOSPITAL | Age: 34
End: 2024-06-07

## 2024-06-10 ENCOUNTER — APPOINTMENT (OUTPATIENT)
Dept: PHYSICAL THERAPY | Facility: HOSPITAL | Age: 34
End: 2024-06-10
Attending: Other
Payer: MEDICAID

## 2024-06-10 ENCOUNTER — TELEPHONE (OUTPATIENT)
Dept: ADMINISTRATIVE | Age: 34
End: 2024-06-10

## 2024-06-10 ENCOUNTER — TELEPHONE (OUTPATIENT)
Dept: PHYSICAL THERAPY | Facility: HOSPITAL | Age: 34
End: 2024-06-10

## 2024-06-10 NOTE — TELEPHONE ENCOUNTER
[facility]  Department of Radiology  [address]  [place] [address] [phone]      Name: Negar Gaona Dr: Melissa Lamb PA-C  : 1990    Age/Sex: 34 year old Female  Pt. Phone: [phone]  Exam Date: 2024  Procedure: CT UROGRAM(W+WO) W/3D(CPT=74178/04173)   -----------------------------------------------------------------------------------------------------------------------------------------------                  Melissa Lamb PA-C  [address]  [address]  [facility] [address]      Interpretation   PROCEDURE:  CT UROGRAM(W+WO) W/3D (CPT=74178/61396)     COMPARISON:  [facility], , US KIDNEY/BLADDER (DNY=[id number]), 2024, 1:16 PM.     INDICATIONS:  N28.89 Renal mass R31.29 Microscopic hematuria     TECHNIQUE:  CT images were created of the abdomen and pelvis without and with intravenous non-ionic contrast followed by multiplane 3d MIP imaging and 3D volumetric imaging of delays were performed for a CT urogram.  Dose reduction techniques were used.   Dose information is transmitted to the ACR (American College of Radiology) NRDR (National Radiology Data Registry) which includes the Dose Index Registry.     3-D RENDERING:  Three dimensional image processing was completed using a separate workstation under concurrent supervision. Images were archived.     PATIENT STATED HISTORY:(As transcribed by Technologist)  Renal mass, microscopic hematuria.       CONTRAST USED:  100cc of Isovue 370     FINDINGS:    KIDNEYS:  No mass, obstruction, or calcification.    ADRENALS:  No mass or enlargement.    URINARY BLADDER:  Mild circumferential bladder wall thickening, correlate for cystitis.  No calculus.  URETERS:  No focal dilation or luminal filling defect.  LIVER:  No enlargement, atrophy, abnormal density, or significant focal lesion.    BILIARY:  Cholecystectomy.  No significant biliary ductal dilation.  PANCREAS:  No  lesion, fluid collection, ductal dilatation, or atrophy.    SPLEEN:  No enlargement or focal lesion.    AORTA/VASCULAR:  No aneurysm or dissection.    RETROPERITONEUM:  No mass or adenopathy.    BOWEL/MESENTERY:  No dilated bowel or wall thickening.  Appendix is not visualized.  ABDOMINAL WALL:  No mass or hernia.    PELVIC NODES:  No adenopathy.    PELVIC ORGANS:  No visible mass.  Pelvic organs appropriate for patient age.    BONES:  No bony lesion or fracture.    LUNG BASES:  No visible pulmonary or pleural disease.    OTHER:  Negative.                     =====  CONCLUSION:    1. No renal mass.  Symmetric enhancement of the bilateral kidneys.  2. No evidence of upper tract urothelial disease.  No nephrolithiasis or hydronephrosis.  3. Mild circumferential urinary bladder wall thickening, correlate for cystitis.              LOCATION:  [facility]        Dictated by (CST): Alen Freeman MD on 5/01/2024 at 10:54 PM       Finalized by (CST): Alen Freeman MD on 5/01/2024 at 11:04 PM            This letter (including any attachments) contains confidential information intended only for the addressee.  Any use or disclosure by any other person is unlawful.  If you are not the intended recipient, please notify our department immediately at [phone] and we will make arrangements for the return of the information to [facility].  You are hereby notified that any disclosure, copying, or distribution of this letter, or the taking of any action based on it, is strictly prohibited by law.                      Procedures  Negar Gaona (MRN [id number])  Procedures signed by Hollis Edmondson MD at 5/9/2024  9:42 AM    Author: Hollis Edmondson MD Service: -- Author Type: Physician   Filed: 5/9/2024  9:42 AM Encounter Date: 5/9/2024 Status: Signed   : Hollis Edmondson MD (Physician)     Clinic Procedure Note     INDICATIONS:   Negar Gaona is a 34 year old female with history of MS, thyromegaly who presented  for microscopic hematuria.  Renal ultrasound showed a possible 4.5 cm suspicious left renal mass.  CT urogram did not show this renal mass, though it was a bit suspicious looking on the renal ultrasound so to be safe I will order a abdominal MRI to ensure there is no subtle renal mass in this area.     PROCEDURE:        1. Flexible cystourethroscopy     DATE OF PROCEDURE: 2024      PRE-PROCEDURE DIAGNOSIS: Microscopic hematuria     POST-PROCEDURE DIAGNOSIS: Same     SURGEON: Hollis Edmondson MD     FINDINGS:     Urethra: Normal caliber urethra throughout without lesions     Bladder: Normal mucosa with no papillary lesions or erythema, no trabeculation, mild trigonitis     Ureteral orifices: Orthotopic     Other findings: None     PROCEDURE:   Patient was brought to the procedure suite and a time-out was performed identifiying the patient,  and procedure to be performed. The risks and benefits of the procedure were once again discussed with the patient including bleeding, infection, and dysuria. The patient agreed to proceed. The patient did not have any signs or symptoms of active UTI.     She was placed in supine position on the table with legs to the sides and the genital area was prepped and draped in the standard sterile fashion. A flexible cystoscope was inserted per urethra. There were no urethral strictures present. The bladder was fully inspected (including retroflexion) and showed findings as above. Both ureteral orifices were orthotopic. The scope was then carefully removed and once again no urethral abnormalities were noted.     There were no complications and the patient tolerated the procedure well.     IMPRESSION:  Negar Gaona is a 34 year old female with history of MS, thyromegaly who presented for microscopic hematuria.  Renal ultrasound showed a possible 4.5 cm suspicious left renal mass.  CT urogram did not show this renal mass, though it was a bit suspicious looking on the renal  ultrasound so to be safe I will order a abdominal MRI to ensure there is no subtle renal mass in this area.     Normal cystoscopy today to complete microscopic materia workup.     PLAN:   -Abdominal MRI to ensure no subtle renal mass present        Hollis Edmondson MD  Staff Urologist  [facility]  Office: [phone]           Chart Review: Note Routing History    No routing history on file.         Procedure  on 2024          Note shared with patient          Patient Information    Patient Name  Negar Gaona Legal Sex  Female   1990     Progress Notes signed by Melissa Lamb PA-C at 2024 11:14 AM    Author: Melissa Lamb PA-C Service: Urology Author Type: Physician Assistant   Filed: 2024 11:14 AM Date of Service: 2024  6:00 PM Status: Signed   : Melissa Lamb PA-C (Physician Assistant)     Discussed on phone- no concerns for renal mass but given ongoing microhematuria without convincing UTI on PCR culture. Will complete entire course of abx and plan for cysto next wk.       Electronically signed by Melissa Lamb PA-C at 2024 11:14 AM     Chart Review: Note Routing History    No routing history on file.         CT UROGRAM WITH AND WITHOUT CONTRAST on 2024          Note shared with patient            Progress Notes  Negar Gaona (MRN [id number])  Progress Notes signed by Melissa Lamb PA-C at 2024 10:12 AM    Author: Melissa Lamb PA-C Service: -- Author Type: Physician Assistant   Filed: 2024 10:12 AM Encounter Date: 2024 Status: Addendum   : Melissa Lamb PA-C (Physician Assistant)   Related Notes: Original Note by Melissa Lamb PA-C (Physician Assistant) filed at 2024  9:56 AM   Expand All Collapse All    [facility]     Urology Consult Note     History of Present Illness:   Patient is a(n) 34 year old female with hx of MS, thyromegaly who presents for microscopic hematuria.      Pt  initially had severe back pain on 4/6/24 that she went to the ER for. Eventually this developed into frequency and urgency with voiding. She was also very constipated at this time. UA positive for 6-10 RBC. Culture not reflexed. PCP ordered KBUS which showed 4.5cm mass in left kidney suspicious for RCC but could be focal pyelo. Further imaging recommended.     Since then, she has episodes of needing to void [id number] with stinging sensation every few days. No longer constipated. Having soft BM daily. No fever, chills.      No hx of UTI, stones, or pyelo. Currently has depo and no period in yrs. Fhx positive for prostate cx in a couple cousins and breast cx in a couple cousins. No smoking hx.      UA today moderate blood.   HISTORY:  Past Medical History       Past Medical History:    Disorder of thyroid     THYROMEGALLY    Low back pain    MS (multiple sclerosis) (HCC)    Seizure (HCC)    Seizure disorder (HCC)     as a child    Visual impairment     GLASSES         Past Surgical History         Past Surgical History:   Procedure Laterality Date    Appendectomy   02/14/2018    Appendectomy        Removal gallbladder         2021    Tonsillectomy             Family History         Family History   Problem Relation Age of Onset    Diabetes Father      Hypertension Father      Diabetes Paternal Grandfather      Breast Cancer Maternal Aunt 40    Breast Cancer Paternal Aunt 30    Breast Cancer Maternal Cousin Female 30         Social History:   Short Social Hx on File   Social History            Socioeconomic History    Marital status: Single   Tobacco Use    Smoking status: Never    Smokeless tobacco: Never    Tobacco comments:       NON-SMOKER   Vaping Use    Vaping status: Former    Substances: CBD    Devices: Disposable   Substance and Sexual Activity    Alcohol use: Yes       Comment: social     Drug use: Yes       Types: Cannabis       Comment: occasionally   Other Topics Concern    Caffeine Concern No        Comment: tea sometimes    Exercise Yes       Comment: 2x per week            Allergies  Allergies   No Known Allergies        Review of Systems:   A 10-point review of systems was completed and is negative other than as noted above.     Physical Exam:   There were no vitals taken for this visit.     GENERAL APPEARANCE: no acute distress  NEUROLOGIC: converses appropriately  HEAD: atraumatic, normocephalic  LUNGS: non-labored breathing  ABDOMEN: soft, nontender, non-distended  BACK: no CVA tenderness  PSYCH: appropriate affect and mood     Results:      Laboratory Data:        Lab Results   Component Value Date     WBC 6.3 02/29/2024     HGB 13.4 02/29/2024     .0 02/29/2024            Lab Results   Component Value Date      02/29/2024     K 4.1 02/29/2024      02/29/2024     CO2 24.0 02/29/2024     BUN 11 02/29/2024     GLU 96 02/29/2024     GFRAA 108 06/20/2022     AST 19 02/29/2024     ALT 27 02/29/2024     TP 8.1 02/29/2024     ALB 3.8 02/29/2024     CA 9.3 02/29/2024     MG 2.2 10/05/2022         Urinalysis Results (last 3 years):          Recent Labs     12/27/21  1032 10/05/22  1746 10/21/23  1536 04/09/24  1517 04/18/24  0917   COLORUR Yellow Yellow  --  Light-Yellow  --    CLARITY Clear Clear  --  Clear  --    SPECGRAVITY 1.025 1.019 >=1.030 1.019 1.020   PHURINE 7.5 6.0  --  7.0 7.0   PROUR Negative Negative  --  Negative  --    GLUUR Negative Negative Negative Normal  --    KETUR 40* Negative  --  Negative  --    BILUR Negative Negative  --  Negative  --    BLOODURINE Trace-lysed* Moderate*  --  1+*  --    NITRITE Negative Negative  --  Negative Negative   UROBILINOGEN 0.2 <2.0  --  Normal  --    LEUUR Negative Negative  --  Negative  --    WBCUR  --  1-5  --  None Seen  --    RBCUR  --  6-10*  --  6-10*  --    BACUR  --  None Seen  --  None Seen  --          Urine Culture Results (last 3 years):  No results found for: \"URINECUL\"     Imaging  US KIDNEY/BLADDER (CPT=76770)     Result  Date: 4/12/2024  PROCEDURE:  US KIDNEY/BLADDER (CPT=76770)  [facility] (CPT=76700), 12/27/2021, 11:07 AM.  INDICATIONS:  R31.29 Other microscopic hematuria  TECHNIQUE:  Transabdominal gray scale ultrasound imaging of the bilateral kidneys and bladder was performed.  Routine technique was utilized.   PATIENT STATED HISTORY: (As transcribed by Technologist)     FINDINGS:   RIGHT KIDNEY MEASUREMENTS:  12.1 cm ECHOGENICITY:  Normal. HYDRONEPHROSIS:  None. CYSTS/STONES/MASSES:  None.  LEFT KIDNEY MEASUREMENTS:  10.1 cm ECHOGENICITY:  Normal. HYDRONEPHROSIS:  None. CYSTS/STONES/MASSES:  Solid-appearing partly exophytic mass is noted interpolar region of left kidney measuring up to 4.5 cm in diameter.  This does appear to demonstrate increased Doppler signal.  BLADDER:  Normal. OTHER:  Negative.             CONCLUSION:  4.5 cm mass is suggested in the left kidney.  This is very suspicious for renal neoplasm although could potentially represent focal area of pyelonephritis.  Additional evaluation with pre and post-contrast CT or MRI is recommended.  Report will be called to the referring physician's office.   LOCATION:  [place]     Dictated by (CST): Alejandro Zaragoza MD on 4/12/2024 at 2:09 PM     Finalized by (CST): Alejandro Zaragoza MD on 4/12/2024 at 2:12 PM              Impression:   Recommendations:  Microhematuria  - microscopic and PCR culture today   - will get CTU to further evaluate renal mass and microhematuria  - cysto scheduled     Thank you very much for this consult. Please call if there are any questions or concerns.     Melissa Lamb PA-C  Urology  [facility]  Phone: [phone]     Date: 4/18/2024  Time: 9:49 AM        Chart Review: Note Routing History    No routing history on file.         Office Visit on 4/18/2024            Revision History          Note shared with patient

## 2024-06-10 NOTE — TELEPHONE ENCOUNTER
Called the patient regarding her missed appointment today at 3:30. Asked the patient to call back if she is unable to make it to her next scheduled appointment.

## 2024-06-11 ENCOUNTER — TELEPHONE (OUTPATIENT)
Dept: ADMINISTRATIVE | Facility: HOSPITAL | Age: 34
End: 2024-06-11

## 2024-06-11 NOTE — TELEPHONE ENCOUNTER
Good Afternoon, Please be advise that the MRI ABDOMEN (W+WO) (CPT=74183  has been denied by the [employer].  [facility] can call an initiate a P2P to be done to see if the denial can be overturned.  All clinicals has been submitted. Case#[id number] Tel#[phone]    Delores Queen    Denial Reason:    Urgent Action Needed: Information needed in order to approve request for   member LASHAWN PARIRSH.  Date: 6/10/2024 Member number: [id number]  Beneficiary’s name: LASHAWN PARRISH  This fax is to inform you that our Medical Director has reviewed your request for   LASHAWN PARRISH, CPT 27286 - Magnetic resonance imaging (MRI), a special kind   of picture of your stomach area without and with contrast (dye).  Please note that the service requested for the above listed member has been denied.  Your healthcare provider told us that you may have a cystic mass (a sac filled with fluid)   in your kidney(s). The request cannot be approved because:  A study similar to the one requested has recently been performed. The results of that   study showed your doctor what they needed to see in order to treat your problem. No   additional imaging is needed at this time.   This finding was based on review of [employer] Abdomen Imaging Guidelines Section(s):   Indeterminate Renal Lesion (AB 35.1),   You are allotted an additional seven calendar days from the notification of adverse   determination to send one set of additional clinical or complete a [id number]   discussion with a Medical Director (by calling [phone], select Option 4).   Reference number [id number] will need to be entered or provided. This must be   completed within 7 calendar days from the date of this letter.

## 2024-06-13 ENCOUNTER — HOSPITAL ENCOUNTER (OUTPATIENT)
Dept: MRI IMAGING | Age: 34
Discharge: HOME | End: 2024-06-13
Attending: SURGERY
Payer: MEDICAID

## 2024-06-13 ENCOUNTER — OFFICE VISIT (OUTPATIENT)
Dept: ORTHOPEDICS CLINIC | Facility: CLINIC | Age: 34
End: 2024-06-13
Payer: MEDICAID

## 2024-06-13 VITALS — HEIGHT: 63 IN | BODY MASS INDEX: 44.3 KG/M2 | WEIGHT: 250 LBS

## 2024-06-13 DIAGNOSIS — N28.89 RENAL MASS: ICD-10-CM

## 2024-06-13 DIAGNOSIS — M21.41 PES PLANUS OF BOTH FEET: ICD-10-CM

## 2024-06-13 DIAGNOSIS — E66.9 OBESITY (BMI 30-39.9): ICD-10-CM

## 2024-06-13 DIAGNOSIS — M21.42 PES PLANUS OF BOTH FEET: ICD-10-CM

## 2024-06-13 DIAGNOSIS — M79.671 FOOT PAIN, RIGHT: Primary | ICD-10-CM

## 2024-06-13 DIAGNOSIS — Z30.09 COUNSELING FOR INITIATION OF BIRTH CONTROL METHOD: ICD-10-CM

## 2024-06-13 DIAGNOSIS — G35 MULTIPLE SCLEROSIS (HCC): ICD-10-CM

## 2024-06-13 PROCEDURE — 99204 OFFICE O/P NEW MOD 45 MIN: CPT | Performed by: PODIATRIST

## 2024-06-13 PROCEDURE — 74183 MRI ABD W/O CNTR FLWD CNTR: CPT | Performed by: SURGERY

## 2024-06-13 PROCEDURE — A9575 INJ GADOTERATE MEGLUMI 0.1ML: HCPCS | Performed by: SURGERY

## 2024-06-13 RX ORDER — GADOTERATE MEGLUMINE 376.9 MG/ML
20 INJECTION INTRAVENOUS
Status: COMPLETED | OUTPATIENT
Start: 2024-06-13 | End: 2024-06-13

## 2024-06-13 RX ORDER — MEDROXYPROGESTERONE ACETATE 150 MG/ML
150 INJECTION, SUSPENSION INTRAMUSCULAR
Qty: 1 EACH | Refills: 0 | Status: SHIPPED | OUTPATIENT
Start: 2024-06-13 | End: 2024-09-11

## 2024-06-13 RX ADMIN — GADOTERATE MEGLUMINE 20 ML: 376.9 INJECTION INTRAVENOUS at 21:48:00

## 2024-06-13 NOTE — TELEPHONE ENCOUNTER
Per verbal Dr. Sunni hercules to refill   Rx sent    Future Appointments   Date Time Provider Department Center   6/13/2024  8:45 PM PF MRI RM1 (1.5T) PF MRI [place]   6/14/2024  3:45 PM Nishi Hamilton, PT [facility]   6/19/2024  3:30 PM Nishi Hamilton, PT [facility]   6/20/2024  8:40 AM Alize Moeller MD [facility]W [facility] [place]   7/11/2024 11:00 AM PF MRI RM1 (1.5T) PF MRI [place]   7/15/2024  8:45 AM Hollis Edmondson MD [id number] [facility] [address]   8/9/2024  9:20 AM Ami Mukherjee PA-C [id number] [address]   10/17/2024  9:40 AM Maryjane Jimenez APRN [facility]I [facility] [address]

## 2024-06-13 NOTE — TELEPHONE ENCOUNTER
Per last OV notes:    Progress Notes  Latricia Rahman, RN (Registered Nurse)  Registered Nurse  Patient here for depo  Urine preg negative  Depo administered  Next due June 11-18  Tolerated well  Left office in stable condition

## 2024-06-13 NOTE — PROGRESS NOTES
[facility] New Patient Note    CC:   Chief Complaint   Patient presents with    Foot Pain     RIGHT FOOT PAIN STARTING 5/25/24  PATIENT WAS SEEN IN URGENT CARE , SHE NOTES SHE NOTICED PAIN AFTER WALKING [place] WITH HER KIDS   SHE DECIDED TO REST HER FOOT BUT THE PAIN DIDN'T GO AWAY  SHE NOTES URGENT CARE GAVE HER AN ANTIINFLAMMATORY WHICH HELPED BUT PAIN IS STILL THERE        HPI: The patient is a 34 year old female who presents today with complaints of right foot pain.    Along heel and side of foot right  swell? no  No injury but on feet a lot  [place] with a lot of walking  UC - no fracture on 5/31      Tecifidera for MS for 4 years now      Past Medical History:    Disorder of thyroid    THYROMEGALLY    Low back pain    MS (multiple sclerosis) (HCC)    Seizure (HCC)    Seizure disorder (HCC)    as a child    Visual impairment    GLASSES     Past Surgical History:   Procedure Laterality Date    Appendectomy  02/14/2018    Appendectomy      Removal gallbladder      2021    Tonsillectomy       Current Outpatient Medications   Medication Sig Dispense Refill    medroxyPROGESTERone Acetate (DEPO-PROVERA IM)       TECFIDERA 240 MG Oral Capsule Delayed Release TAKE 1 CAPSULE IN THE MORNING AND 1 CAPSULE BEFORE BEDTIME 60 capsule 9    gabapentin 100 MG Oral Cap Week 1- take 1 capsule three times a day, Week 2- if nerve pain still present, take 2 caps three times a day, Week 3 and on- if tolerating and if nerve pain still present, take 3 caps three times a day 270 capsule 0    ondansetron 4 MG Oral Tablet Dispersible Take 1 tablet (4 mg total) by mouth every 8 (eight) hours as needed for Nausea. 10 tablet 0    Cholecalciferol (VITAMIN D3) 50 MCG (2000 UT) Oral Chew Tab       Cyanocobalamin (B-12) 1000 MCG Oral Tab        No Known Allergies  Family History   Problem Relation Age of Onset    Diabetes Father     Hypertension Father     Diabetes Paternal Grandfather     Breast  Cancer Maternal Aunt 40    Breast Cancer Paternal Aunt 30    Breast Cancer Maternal Cousin Female 30     Social History     Occupational History    Not on file   Tobacco Use    Smoking status: Never    Smokeless tobacco: Never    Tobacco comments:     NON-SMOKER     Updated 6/13/24   Vaping Use    Vaping status: Former    Substances: CBD    Devices: Disposable   Substance and Sexual Activity    Alcohol use: Yes     Comment: social     Drug use: Yes     Types: Cannabis     Comment: occasionally    Sexual activity: Not on file        ROS:  Complete ROS reviewed by me and non-contributory to the chief complaint except as mentioned above.    Physical Exam:    Ht 5' 3\" (1.6 m)   Wt 250 lb (113.4 kg)   BMI 44.29 kg/m²       Exam right foot and ankle tenderness along the fifth ray is very subtle.  No swelling today pretty normal exam.  Flexible flatfoot upon stance with overpronation in walking gait  Sensation is intact sharp versus dull.  She can feel light touch to the tips of the toes  Palpable pedal pulses.  Hair growth is present.  Skin is supple and feet are well perfused and warm.    Strength is 5 out of 5 all muscle groups    Full range of motion and nonpainful motion of the ankle joint, subtalar joint, MP joints, and midfoot joints.     Imaging:  3 views right foot  No apparent fracture or bony abnormality.     personally viewed, independently interpreted and radiology report read.      Assessment/Diagnoses:  Diagnoses and all orders for this visit:    Foot pain, right  -     z [employer] MRI FOOT, RIGHT (CPT=73718); Future    Multiple sclerosis (HCC)    Obesity (BMI 30-39.9)    Pes planus of both feet        Plan:  I reviewed imaging and exam findings with the patient.  She has a pretty normal exam with the exception of her flatfeet.  She is painful in the past in the area of the fifth metatarsal and this is always a potential area of stress fracture.  She is concerned about increasing her activity because she  starts to get pain in that region again.  We talked about the type of shoes that are good for her as far as her flatfeet and when she is on her feet long hours, such as a light running shoe for a flatfoot not sketchers not [place] or flexible memory foam type shoes.  She understands.  She also could get OTC inserts from one of the running shoe stores as an option as well.    She is questioning about the medication she is on for MS and whether that can affect her bone health.  Recommended reaching out to Dr. Moeller as I am not sure of any correlation on that myself    Order MRI of the right foot to rule out stress fracture of the fifth metatarsal.  She is concerned about increasing her activities or being active this summer because of the continued pain and x-rays were negative    I will let her know results of MRI on my chart  -       Serena Ding DPM  [facility]      This document was partially prepared using [employer] voice recognition software.

## 2024-06-14 ENCOUNTER — OFFICE VISIT (OUTPATIENT)
Dept: PHYSICAL THERAPY | Facility: HOSPITAL | Age: 34
End: 2024-06-14
Attending: Other
Payer: MEDICAID

## 2024-06-14 PROCEDURE — 97110 THERAPEUTIC EXERCISES: CPT

## 2024-06-14 NOTE — PROGRESS NOTES
Discharge Summary  Pt has attended 5 visits in Physical Therapy.     Diagnosis:   Lumbar radiculopathy (M54.16)       Referring Provider: Nicky Ochoa  Date of Evaluation:  4/26/2024    Precautions:  None Next MD visit: none scheduled  Date of Surgery: n/a   Insurance Primary/Secondary: [employer]UE [employer]OSS MEDICAID / N/A     # Auth Visits: 9 AUTH            Subjective: The patient states that she has been really good. Her symptoms haven't been present recently, she hasn't had any back pain or leg pain. She has some back pain with laying flat on the bed, but she notes that she has a really uncomfortable mattress. She has been doing exercises on and off.     Pain: 0/10      Objective:       Lumbar  Flexion: WNL, no pain  Extension; WNL, a little pain at end range  Lateral flexion: WNL, no pain  Rotation: WNL, no pain    Hip abduction: 4+/5 VEE        Assessment: The patient has demonstrated improvements in her symptoms. She has full lumbar spine range of motion and this does not provoke any leg pain. The patient at this time has met most long term goals and she will be discharged from this plan of care for physical therapy.         Goals:   (to be met in 9 visits)  progressing  The patient will demonstrate lumbar extension AROM to 20 deg in pain free range MET  The patient will demonstrate pain free R lateral flexion AROM MET  The patient will report being able to sit for 1 hour without limitation due to back pain MET  The patient will report being able to sleep through the night with no back pain waking her up PARTIALLY MET- sometiems she wakes up when she ends up flat on her back. Pt reports this is likely due to the mattress.   The patient will be independent and adherent in a comprehensive HEP  MET      Oswestry Disability Index Score  Score: 20 % (4/25/2024 10:50 AM)    Post Oswestry Disability Index Score  Post Score: 0 % (6/14/2024  3:54 PM)    20 % improvement    Plan: The patient will be discharged from this  plan of care for physical therapy at this time.     Patient/Family/Caregiver was advised of these findings, precautions, and treatment options and has agreed to actively participate in planning and for this course of care.    Thank you for your referral. If you have any questions, please contact me at Dept: [phone].    Sincerely,  Electronically signed by therapist: Nishi Hamilton, PT       Certification From: 6/14/2024  To:9/12/2024       Plan: strengthening to lumbar spine  Date: 4/29/2024  TX#: 2/9 Date: 5/1/2024                TX#: 3/9 Date: 5/9/2024                TX#: 4/9 Date: 6/14/2024            TX#: 5/9 Date:   Tx#: 6/   Manual therapy  L5 CPA grade III+  L5-S1 UPA grade III+ on L  X10 min Manual therapy  L5 CPA grade III+  L5-S1 UPA grade III+ VEE  X9 min Manual therapy  L5 CPA grade III+  X2x4 min X    Therapeutic exercise  Review sidelying rotaiton 10x ea  LTR 10x  Bridge 2x6 Therapeutic exercise  Cat cow 10x  LTR 10x   Bridge 2x8  Standing lean on high mat table, hip extension 2x10  Quadriped hip extension 2x5 ea Therapeutic exercise  Cat cow 10x  Child pose 2x30 sec  Supine lumbar rotation stretching 3x10 sec ea  Bridge march 2x6  Quadriped hip extension 6x with abdominal contraction  Standing hip flexion/extension red TB 2x6 ea  Standing hip abduction 2x8 ea red TB Therapeutic exercise  Reassessment  Cat cow 10x  Child pose 3x  Quadriped hip extension with core activation 2x5 ea  Lumbar rotation stretching 5x ea, 5-10 sec  Bridge with march 2x6     X X X X    X X X X    HEP:   Access Code: [email or web address]  URL: [email or web address]  Date: 05/01/2024  Prepared by: Nishi    Exercises  - Sidelying Thoracic Rotation with Open Book  - 2 x daily - 7 x weekly - 1 sets - 10 reps  - Cat Cow  - 1 x daily - 7 x weekly - 1 sets - 10 reps  - Quadruped Leg Extension with Resistance  - 1 x daily - 7 x weekly - 3 sets - 5 reps    Charges:  therapeutic exercise x3        Total Timed Treatment: 40 min  Total  Treatment Time: 40 min

## 2024-06-14 NOTE — PROGRESS NOTES
Brock Bills,    I have reviewed your test results.  Great news, your MRI confirms no renal masses present.  Please let me know if you have any questions.    Thanks,  Hollis Edmondson MD

## 2024-06-17 ENCOUNTER — NURSE ONLY (OUTPATIENT)
Dept: FAMILY MEDICINE CLINIC | Facility: CLINIC | Age: 34
End: 2024-06-17
Payer: MEDICAID

## 2024-06-17 DIAGNOSIS — Z30.42 ENCOUNTER FOR SURVEILLANCE OF INJECTABLE CONTRACEPTIVE: Primary | ICD-10-CM

## 2024-06-17 PROCEDURE — 96372 THER/PROPH/DIAG INJ SC/IM: CPT | Performed by: NURSE PRACTITIONER

## 2024-06-17 RX ORDER — MEDROXYPROGESTERONE ACETATE 150 MG/ML
150 INJECTION, SUSPENSION INTRAMUSCULAR ONCE
Status: COMPLETED | OUTPATIENT
Start: 2024-06-17 | End: 2024-06-17

## 2024-06-17 RX ADMIN — MEDROXYPROGESTERONE ACETATE 150 MG: 150 INJECTION, SUSPENSION INTRAMUSCULAR at 14:55:00

## 2024-06-17 NOTE — PROGRESS NOTES
Patient here for Depo injection    Last given 3/19/24  Patient brought in prescription from pharmacy  Consent form signed    Patient tolerated well    Reminded next dose would be due Sept 9-16th

## 2024-06-19 ENCOUNTER — APPOINTMENT (OUTPATIENT)
Dept: PHYSICAL THERAPY | Facility: HOSPITAL | Age: 34
End: 2024-06-19
Attending: Other
Payer: MEDICAID

## 2024-06-21 ENCOUNTER — PATIENT MESSAGE (OUTPATIENT)
Dept: FAMILY MEDICINE CLINIC | Facility: CLINIC | Age: 34
End: 2024-06-21

## 2024-06-21 DIAGNOSIS — L98.9 SKIN LESION: Primary | ICD-10-CM

## 2024-06-21 NOTE — TELEPHONE ENCOUNTER
From: Negar Gaona  To: Alize Moeller  Sent: 6/21/2024 6:49 AM CDT  Subject: Hello    I just have a question. My left armpit has been constantly itchy for months. I’ve tried changing deodorant, putting itch cream on and even stopped using deodorant for a bit and nothing seems to be working. So I wanted to know should I be concerned about it being something deeper then what I think it is or just some skin issues I’m having?

## 2024-06-28 NOTE — TELEPHONE ENCOUNTER
Discuss at her upcoming appointment  I would definitely recommend considering a different form of contraception. Depo is notorious for causing significant weight gain

## 2024-07-11 ENCOUNTER — HOSPITAL ENCOUNTER (OUTPATIENT)
Dept: MRI IMAGING | Age: 34
Discharge: HOME | End: 2024-07-11
Attending: Other
Payer: MEDICAID

## 2024-07-11 DIAGNOSIS — R42 DIZZINESS: ICD-10-CM

## 2024-07-11 PROCEDURE — 70553 MRI BRAIN STEM W/O & W/DYE: CPT | Performed by: OTHER

## 2024-07-11 PROCEDURE — A9575 INJ GADOTERATE MEGLUMI 0.1ML: HCPCS | Performed by: OTHER

## 2024-07-11 RX ORDER — GADOTERATE MEGLUMINE 376.9 MG/ML
20 INJECTION INTRAVENOUS
Status: COMPLETED | OUTPATIENT
Start: 2024-07-11 | End: 2024-07-11

## 2024-07-11 RX ADMIN — GADOTERATE MEGLUMINE 20 ML: 376.9 INJECTION INTRAVENOUS at 12:14:00

## 2024-07-15 ENCOUNTER — VIRTUAL PHONE E/M (OUTPATIENT)
Dept: SURGERY | Facility: CLINIC | Age: 34
End: 2024-07-15
Payer: MEDICAID

## 2024-07-15 DIAGNOSIS — R31.29 HEMATURIA, MICROSCOPIC: Primary | ICD-10-CM

## 2024-07-15 PROCEDURE — 99212 OFFICE O/P EST SF 10 MIN: CPT | Performed by: SURGERY

## 2024-07-15 NOTE — PROGRESS NOTES
Urology Clinic Note  Telemedicine Visit  Audio Only  The patient consented to performing this visit virtually.     Primary Care Provider:  Alize Moeller MD     Chief Complaint:   Microscopic hematuria    HPI:   Negar Gaona is a 34 year old female with history of MS, thyromegaly who presented for microscopic hematuria.  Renal ultrasound showed a possible 4.5 cm suspicious left renal mass.  CT urogram did not show this renal mass, though it was a bit suspicious looking on the renal ultrasound so to be safe I will order a abdominal MRI to ensure there is no subtle renal mass in this area.    MRI 6/13/24 confirms no renal masses present.     Normal cystoscopy to complete microscopic hematuria workup.    PSA:  No results found for: \"PSA\", \"PERCENTPSA\", \"PSAS\", \"PSAULTRA\"     History:     Past Medical History:    Disorder of thyroid    THYROMEGALLY    Low back pain    MS (multiple sclerosis) (HCC)    Seizure (HCC)    Seizure disorder (HCC)    as a child    Visual impairment    GLASSES       Past Surgical History:   Procedure Laterality Date    Appendectomy  02/14/2018    Appendectomy      Removal gallbladder      2021    Tonsillectomy         Family History   Problem Relation Age of Onset    Diabetes Father     Hypertension Father     Diabetes Paternal Grandfather     Breast Cancer Maternal Aunt 40    Breast Cancer Paternal Aunt 30    Breast Cancer Maternal Cousin Female 30       Social History     Socioeconomic History    Marital status: Single   Tobacco Use    Smoking status: Never    Smokeless tobacco: Never    Tobacco comments:     NON-SMOKER     Updated 6/13/24   Vaping Use    Vaping status: Former    Substances: CBD    Devices: Disposable   Substance and Sexual Activity    Alcohol use: Yes     Comment: social     Drug use: Yes     Types: Cannabis     Comment: occasionally   Other Topics Concern    Caffeine Concern No     Comment: tea sometimes    Exercise Yes     Comment: 2x per week       Medications  (Active prior to today's visit):  Current Outpatient Medications   Medication Sig Dispense Refill    medroxyPROGESTERone Acetate (DEPO-PROVERA) 150 MG/ML Intramuscular Suspension Prefilled Syringe Inject 150 mg into the muscle every 3 (three) months. 1 each 0    medroxyPROGESTERone Acetate (DEPO-PROVERA IM)       TECFIDERA 240 MG Oral Capsule Delayed Release TAKE 1 CAPSULE IN THE MORNING AND 1 CAPSULE BEFORE BEDTIME 60 capsule 9    gabapentin 100 MG Oral Cap Week 1- take 1 capsule three times a day, Week 2- if nerve pain still present, take 2 caps three times a day, Week 3 and on- if tolerating and if nerve pain still present, take 3 caps three times a day 270 capsule 0    ondansetron 4 MG Oral Tablet Dispersible Take 1 tablet (4 mg total) by mouth every 8 (eight) hours as needed for Nausea. 10 tablet 0    Cholecalciferol (VITAMIN D3) 50 MCG (2000 UT) Oral Chew Tab       Cyanocobalamin (B-12) 1000 MCG Oral Tab          Allergies:  No Known Allergies    Review of Systems:   A comprehensive 10-point review of systems was completed.  Pertinent positives and negatives are noted in the the HPI.    Physical Exam:   No physical exam performed during this telephone encounter.    Assessment & Plan:   Negar Gaona is a 34 year old female with history of MS, thyromegaly who presented for microscopic hematuria.  Renal ultrasound showed a possible 4.5 cm suspicious left renal mass.  CT urogram did not show this renal mass, though it was a bit suspicious looking on the renal ultrasound so to be safe I will order a abdominal MRI to ensure there is no subtle renal mass in this area.    MRI 6/13/24 confirms no renal masses present.     Normal cystoscopy to complete microscopic hematuria workup.    - Yearly UA with PCP  - RTC as needed    In total, 5 minutes were spent on this patient encounter for medical discussion.    oHllis Edmondson MD  Staff Urologist  [facility]  Office: [phone]

## 2024-07-31 ENCOUNTER — HOSPITAL ENCOUNTER (OUTPATIENT)
Age: 34
Discharge: HOME | End: 2024-07-31
Payer: MEDICAID

## 2024-07-31 ENCOUNTER — APPOINTMENT (OUTPATIENT)
Dept: ULTRASOUND IMAGING | Age: 34
End: 2024-07-31
Attending: NURSE PRACTITIONER
Payer: MEDICAID

## 2024-07-31 ENCOUNTER — APPOINTMENT (OUTPATIENT)
Dept: CT IMAGING | Age: 34
End: 2024-07-31
Attending: NURSE PRACTITIONER
Payer: MEDICAID

## 2024-07-31 VITALS
HEIGHT: 63 IN | OXYGEN SATURATION: 98 % | WEIGHT: 240 LBS | SYSTOLIC BLOOD PRESSURE: 132 MMHG | HEART RATE: 61 BPM | DIASTOLIC BLOOD PRESSURE: 82 MMHG | RESPIRATION RATE: 18 BRPM | BODY MASS INDEX: 42.52 KG/M2 | TEMPERATURE: 97 F

## 2024-07-31 DIAGNOSIS — R10.31 COLICKY RLQ ABDOMINAL PAIN: Primary | ICD-10-CM

## 2024-07-31 LAB
#MXD IC: 0.5 X10ˆ3/UL (ref 0.1–1)
BILIRUB UR QL STRIP: NEGATIVE
BUN BLD-MCNC: 6 MG/DL (ref 7–18)
CHLORIDE BLD-SCNC: 105 MMOL/L (ref 98–112)
CLARITY UR: CLEAR
CO2 BLD-SCNC: 25 MMOL/L (ref 21–32)
COLOR UR: YELLOW
CREAT BLD-MCNC: 0.8 MG/DL
EGFRCR SERPLBLD CKD-EPI 2021: 100 ML/MIN/1.73M2 (ref 60–?)
GLUCOSE BLD-MCNC: 93 MG/DL (ref 70–99)
GLUCOSE UR STRIP-MCNC: NEGATIVE MG/DL
HCT VFR BLD AUTO: 39.1 %
HCT VFR BLD CALC: 40 %
HGB BLD-MCNC: 12.7 G/DL
ISTAT IONIZED CALCIUM FOR CHEM 8: 1.19 MMOL/L (ref 1.12–1.32)
LEUKOCYTE ESTERASE UR QL STRIP: NEGATIVE
LYMPHOCYTES # BLD AUTO: 1.6 X10ˆ3/UL (ref 1–4)
LYMPHOCYTES NFR BLD AUTO: 21.9 %
MCH RBC QN AUTO: 29.3 PG (ref 26–34)
MCHC RBC AUTO-ENTMCNC: 32.5 G/DL (ref 31–37)
MCV RBC AUTO: 90.1 FL (ref 80–100)
MIXED CELL %: 7.5 %
NEUTROPHILS # BLD AUTO: 5.1 X10ˆ3/UL (ref 1.5–7.7)
NEUTROPHILS NFR BLD AUTO: 70.6 %
NITRITE UR QL STRIP: NEGATIVE
PH UR STRIP: 6 [PH]
PLATELET # BLD AUTO: 284 X10ˆ3/UL (ref 150–450)
POTASSIUM BLD-SCNC: 3.8 MMOL/L (ref 3.6–5.1)
RBC # BLD AUTO: 4.34 X10ˆ6/UL
SODIUM BLD-SCNC: 141 MMOL/L (ref 136–145)
SP GR UR STRIP: >=1.03
UROBILINOGEN UR STRIP-ACNC: <2 MG/DL
WBC # BLD AUTO: 7.2 X10ˆ3/UL (ref 4–11)

## 2024-07-31 PROCEDURE — 85025 COMPLETE CBC W/AUTO DIFF WBC: CPT | Performed by: NURSE PRACTITIONER

## 2024-07-31 PROCEDURE — 76830 TRANSVAGINAL US NON-OB: CPT | Performed by: NURSE PRACTITIONER

## 2024-07-31 PROCEDURE — 74176 CT ABD & PELVIS W/O CONTRAST: CPT | Performed by: NURSE PRACTITIONER

## 2024-07-31 PROCEDURE — 80047 BASIC METABLC PNL IONIZED CA: CPT | Performed by: NURSE PRACTITIONER

## 2024-07-31 PROCEDURE — 99214 OFFICE O/P EST MOD 30 MIN: CPT | Performed by: NURSE PRACTITIONER

## 2024-07-31 PROCEDURE — 81002 URINALYSIS NONAUTO W/O SCOPE: CPT | Performed by: NURSE PRACTITIONER

## 2024-07-31 PROCEDURE — 76856 US EXAM PELVIC COMPLETE: CPT | Performed by: NURSE PRACTITIONER

## 2024-07-31 RX ORDER — NAPROXEN 500 MG/1
500 TABLET ORAL 2 TIMES DAILY PRN
Qty: 14 TABLET | Refills: 0 | Status: SHIPPED | OUTPATIENT
Start: 2024-07-31 | End: 2024-08-07

## 2024-07-31 RX ORDER — ONDANSETRON 4 MG/1
4 TABLET, ORALLY DISINTEGRATING ORAL EVERY 4 HOURS PRN
Qty: 10 TABLET | Refills: 0 | Status: SHIPPED | OUTPATIENT
Start: 2024-07-31 | End: 2024-08-07

## 2024-07-31 NOTE — ED PROVIDER NOTES
Patient Seen in: [facility]      History     Chief Complaint   Patient presents with    Abdominal Pain    Fever     Stated Complaint: sharp stomach pain, fever    Subjective:   HPI  34-year-old female presents to me care with complaints of right lower quadrant abdominal pain on and off last month.  The past couple days has increased intensity.  Patient is already had her gallbladder and appendix removed.  States she does not get her period more because she is on the Depo shot.  Denies any vaginal discharge or odors denies any urinary symptoms.  No fever.  No nausea vomiting diarrhea.  No other issues complaints or concerns.  The patient's medication list, past medical history and social history elements as listed in today's nurse's notes were reviewed and agreed (except as otherwise stated in the HPI).  The patient's family history reviewed and determined to be noncontributory to the presenting problem.      Objective:   Past Medical History:    Disorder of thyroid    THYROMEGALLY    Low back pain    MS (multiple sclerosis) (HCC)    Seizure (HCC)    Seizure disorder (HCC)    as a child    Visual impairment    GLASSES              Past Surgical History:   Procedure Laterality Date    Appendectomy  02/14/2018    Appendectomy      Removal gallbladder      2021    Tonsillectomy                  Social History     Socioeconomic History    Marital status: Single   Tobacco Use    Smoking status: Never    Smokeless tobacco: Never    Tobacco comments:     NON-SMOKER     Updated 6/13/24   Vaping Use    Vaping status: Former    Substances: CBD    Devices: Disposable   Substance and Sexual Activity    Alcohol use: Yes     Comment: social     Drug use: Yes     Types: Cannabis     Comment: occasionally   Other Topics Concern    Caffeine Concern No     Comment: tea sometimes    Exercise Yes     Comment: 2x per week              Review of Systems    Positive for stated Chief Complaint: Abdominal Pain and Fever    Other  systems are as noted in HPI.  Constitutional and vital signs reviewed.      All other systems reviewed and negative except as noted above.    Physical Exam     ED Triage Vitals [07/31/24 1141]   /82   Pulse 61   Resp 18   Temp 97.4 °F (36.3 °C)   Temp src Temporal   SpO2 98 %   O2 Device None (Room air)       Current Vitals:   Vital Signs  BP: 132/82  Pulse: 61  Resp: 18  Temp: 97.4 °F (36.3 °C)  Temp src: Temporal    Oxygen Therapy  SpO2: 98 %  O2 Device: None (Room air)            Physical Exam    GENERAL: well developed, well nourished, in distress and in pain: no  SKIN: no rashes, no suspicious lesions  Head: Normocephalic and atraumatic   Eyes: PERRLA+, EOMI+.  Conjunctiva normal.  Cornea clear.  Ears: normal pending membranes.  Normal external auditory canals   Nose: Nasal mucosa.  No sinus tenderness.  No nasal congestion.    Throat: Oropharynx noninjected.  No lip, tongue, throat swelling. Buccal mucosa moist: yes  EYES: PERRLA, EOMI, normal optic disk,conjunctiva are clear  NECK: supple, no adenopathy, no bruits  CHEST: no chest tenderness  LUNGS: clear to auscultation  CARDIO: RRR without murmur  GI: soft, non distended. No visible peristalsis. No masses. No organomegaly. Tenderness in RLQ. Bowel sounds: normal active x 4. Guarding : no. Rigidity: no.  NEURO: Oriented times three, cranial nerves are intact, motor and sensory are grossly intact      ED Course     Labs Reviewed   [facility] POCT URINALYSIS DIPSTICK - Abnormal; Notable for the following components:       Result Value    Protein urine Trace (*)     Ketone, Urine Trace (*)     Blood, Urine Moderate (*)     All other components within normal limits   POCT ISTAT CHEM8 CARTRIDGE - Abnormal; Notable for the following components:    ISTAT BUN 6 (*)     All other components within normal limits   POCT CBC     CT ABDOMEN+PELVIS KIDNEYSTONE 2D RNDR(NO IV,NO ORAL)(CPT=74176)    Result Date: 7/31/2024  PROCEDURE:  CT ABDOMEN+PELVIS KIDNEYSTONE 2D RNDR(NO  IV,NO ORAL)(CPT=74176)  COMPARISON:  [facility], MR, MRI ABDOMEN (W+WO) (CPT=74183), 6/13/2024, 9:32 PM.  INDICATIONS:  sharp stomach pain, fever  TECHNIQUE:  Unenhanced multislice CT scanning from above the kidneys to below the urinary bladder.  2D rendering are generated on the CT scanner workstation to localize potential stones in the cranio-caudal plane.  Dose reduction techniques were used. Dose information is transmitted to the ACR (American College of Radiology) NRDR (National Radiology Data Registry) which includes the Dose Index Registry.  PATIENT STATED HISTORY: (As transcribed by Technologist)  The patient has intermittent right lower abd pain for 1 month that became  worse yesterday. Fever of 100-102 yesterday. Denies N/V/D. Last BM was  yesterday.    FINDINGS:  KIDNEYS:  No mass, obstruction, or calcification. BLADDER:  No mass, calculus or significant wall thickening. ADRENALS:  No mass or enlargement.  LIVER:  No enlargement, atrophy, abnormal density, or significant focal lesion.  BILIARY:  Gallbladder is surgically absent. PANCREAS:  No lesion, fluid collection, ductal dilatation, or atrophy.  SPLEEN:  No enlargement or focal lesion.  AORTA/VASCULAR:  No aneurysm.  RETROPERITONEUM:  No mass or adenopathy.  BOWEL/MESENTERY:  No visible mass, obstruction, or bowel wall thickening.  The appendix is not identified and likely surgically absent. ABDOMINAL WALL:  No mass or hernia.  BONES:  No bony lesion or fracture. PELVIC ORGANS:  Normal for age.  LUNG BASES:  No visible pulmonary or pleural disease.  OTHER:  Negative.             CONCLUSION:  No acute abnormality in the abdomen and pelvis.    LOCATION:  [facility]   Dictated by (CST): Alen Sandoval MD on 7/31/2024 at 1:47 PM     Finalized by (CST): Alen Sandoval MD on 7/31/2024 at 1:54 PM       US PELVIS (TRANSABDOMINAL AND TRANSVAGINAL) (CPT=76856/40527)    Result Date: 7/31/2024  PROCEDURE:  US PELVIS (TRANSABDOMINAL AND TRANSVAGINAL) (CPT=76856/18441)   COMPARISON:  [place], [place], US PELVIS EV (TRNS ABD AND ENDOVAG) (CPT=76856,07381), 9/25/2019, 8:55 AM.  INDICATIONS:  sharp stomach pain, fever  TECHNIQUE:  Pelvic ultrasound using transabdominal and endovaginal technique.  Transvaginal ultrasound was used to better evaluate adnexal and endometrial detail.  PATIENT STATED HISTORY: (As transcribed by Technologist)  Intermittent RLQ pain.     FINDINGS:  TRANSABDOMINAL ULTRASOUND: UTERUS: The uterus measures 7.0 x 3.7 x 4.3 cm. The endometrium appears unremarkable on transabdominal images.  .   Small amount of free pelvic fluid.  TRANSVAGINAL ULTRASOUND: UTERUS:. Endometrial stripe measures:  4 mm in diameter  OVARIES: RIGHT OVARY: Right ovary measures 4.0 x 2.8 x 2.5 cm.  Numerous follicles.  LEFT OVARY: Left ovary is visualized and measures 3.1 x 2.8 x 2.2 cm.  Numerous follicles are noted.  VASCULARITY: Normal flow is documented with color Doppler imaging.             CONCLUSION:  1. Small amount of free pelvic fluid. 2. Numerous follicles within bilateral ovaries.    LOCATION:  [facility]   Dictated by (CST): Hannah Willson MD on 7/31/2024 at 12:43 PM     Finalized by (CST): Hannah Willson MD on 7/31/2024 at 12:45 PM       MRI BRAIN (W+WO) (CPT=70553)    Result Date: 7/11/2024  PROCEDURE:  MRI BRAIN (W+WO) (CPT=70553)  COMPARISON:  MR [facility], MRI BRAIN (W+WO) (CPT=70553), 10/05/2022, 9:23 PM.  INDICATIONS:  R42 Dizziness  TECHNIQUE:  MRI of the brain was performed with multi-planar T1, T2-weighted images with FLAIR sequences and diffusion weighted images without and with infusion.  PATIENT STATED HISTORY:(As transcribed by Technologist)  Patient has MS and experiencing blurred vision and dizziness for a couple months.   CONTRAST USED:  20 mL of Dotarem FINDINGS:   The ventricles and sulci are within normal limits.   There is no midline shift or mass effect.   The basal cisterns are patent.   The craniocervical junction is unremarkable.   Midline structures  including optic chiasm and cerebellar tonsils are overall unremarkable.  There is no acute intracranial hemorrhage or extra-axial fluid collection identified.   Demyelinating plaques in the deep ventricular white matter, mid corpus callosum body, subcortical white matter are stable.   No significant abnormal parenchymal gradient susceptibility.   There is no abnormal parenchymal or leptomeningeal enhancement.  There is no restricted diffusion to suggest acute ischemia/infarction.  The visualized paranasal sinuses and mastoid air cells are unremarkable.   The expected major intracranial flow voids are present.            CONCLUSION:   1. Mild burden of demyelinating plaques is stable.  No enhancing lesions.    LOCATION:  [facility]    Dictated by (CST): Conrado Allen MD on 7/11/2024 at 3:29 PM     Finalized by (CST): Conrado Allen MD on 7/11/2024 at 3:34 PM             MDM   Patient presents with abdominal pain of nonemergent etiology.  No abdominal bruit, no relation to fatty meals, negative Fontenot's, no radiation to back, no CVA tenderness, no history of alcohol abuse, no history of diverticular or bloody stool.  Patient have flexors and normal bowel movements.  Patient nontoxic in appearance with normal vitals.  Unlikely AAA, cholecystitis, pancreatitis, small bowel obstruction, appendicitis, mesenteric ischemia, nephrolithiasis, phonophoresis, or diverticulitis.  No adnexal tenderness, no history of STDs, no vaginal discharge.  Not ectopic.  Unlikely TOA, ovarian torsion, PID.  Patient tolerating p.o. and able to control pain with distraction and p.o. medication.  Plan DC home with return precautions.      Please note that this report has been produced using speech recognition software and may contain errors related to that system including, but not limited to, errors in grammar, punctuation, and spelling, as well as words and phrases that possibly may have been recognized inappropriately.  If there are any  questions or concerns, contact the dictating provider for clarification.                           Medical Decision Making      Disposition and Plan     Clinical Impression:  1. Kulwinderckyocasta RLQ abdominal pain         Disposition:  Discharge  7/31/2024  1:59 pm    Follow-up:  Alize Moeller MD  [address] [address]  [phone]                Medications Prescribed:  Discharge Medication List as of 7/31/2024  2:06 PM        START taking these medications    Details   !! ondansetron 4 MG Oral Tablet Dispersible Take 1 tablet (4 mg total) by mouth every 4 (four) hours as needed for Nausea., Normal, Disp-10 tablet, R-0       !! - Potential duplicate medications found. Please discuss with provider.

## 2024-07-31 NOTE — ED INITIAL ASSESSMENT (HPI)
Patient C/O intermittent right lower abd pain for 1 month that became worse yesterday. Fever of 100-102 yesterday. Denies N/V/D. Last BM was yesterday.

## 2024-07-31 NOTE — DISCHARGE INSTRUCTIONS
Follow-up with your primary care physician in one week if symptoms have not improved or symptoms are starting to get worse.  Increase fluids, keep well-hydrated.  Take naproxen twice daily for 7 days  Use Zofran for as needed for nausea  Return to the emergency room for symptoms or concerns    
no lymph node enlargement

## 2024-08-01 ENCOUNTER — TELEPHONE (OUTPATIENT)
Dept: ADMINISTRATIVE | Facility: HOSPITAL | Age: 34
End: 2024-08-01

## 2024-08-01 NOTE — TELEPHONE ENCOUNTER
Greg Orlando APRN APN  Specialty: Nurse Practitioner Family     ED Provider Notes     Signed     Date of Service: 7/31/2024  2:23 PM     Signed       Expand All Collapse All       Patient Seen in: [facility]        History          Chief Complaint   Patient presents with    Abdominal Pain    Fever      Stated Complaint: sharp stomach pain, fever        Subjective:  HPI  34-year-old female presents to [facility] care with complaints of right lower quadrant abdominal pain on and off last month.  The past couple days has increased intensity.  Patient is already had her gallbladder and appendix removed.  States she does not get her period more because she is on the Depo shot.  Denies any vaginal discharge or odors denies any urinary symptoms.  No fever.  No nausea vomiting diarrhea.  No other issues complaints or concerns.  The patient's medication list, past medical history and social history elements as listed in today's nurse's notes were reviewed and agreed (except as otherwise stated in the HPI).  The patient's family history reviewed and determined to be noncontributory to the presenting problem.              Objective:      Past Medical History:    Disorder of thyroid     THYROMEGALLY    Low back pain    MS (multiple sclerosis) (HCC)    Seizure (HCC)    Seizure disorder (HCC)     as a child    Visual impairment     GLASSES                         Past Surgical History:   Procedure Laterality Date    Appendectomy   02/14/2018    Appendectomy        Removal gallbladder         2021    Tonsillectomy                          Social History            Socioeconomic History    Marital status: Single   Tobacco Use    Smoking status: Never    Smokeless tobacco: Never    Tobacco comments:       NON-SMOKER       Updated 6/13/24   Vaping Use    Vaping status: Former    Substances: CBD    Devices: Disposable   Substance and Sexual Activity    Alcohol use: Yes       Comment: social     Drug use: Yes       Types:  Cannabis       Comment: occasionally   Other Topics Concern    Caffeine Concern No       Comment: tea sometimes    Exercise Yes       Comment: 2x per week                   Review of Systems     Positive for stated Chief Complaint: Abdominal Pain and Fever     Other systems are as noted in HPI.  Constitutional and vital signs reviewed.       All other systems reviewed and negative except as noted above.     Physical Exam          ED Triage Vitals [07/31/24 1141]   /82   Pulse 61   Resp 18   Temp 97.4 °F (36.3 °C)   Temp src Temporal   SpO2 98 %   O2 Device None (Room air)         Current Vitals:   Vital Signs  BP: 132/82  Pulse: 61  Resp: 18  Temp: 97.4 °F (36.3 °C)  Temp src: Temporal     Oxygen Therapy  SpO2: 98 %  O2 Device: None (Room air)                 Physical Exam     GENERAL: well developed, well nourished, in distress and in pain: no  SKIN: no rashes, no suspicious lesions  Head: Normocephalic and atraumatic   Eyes: PERRLA+, EOMI+.  Conjunctiva normal.  Cornea clear.  Ears: normal pending membranes.  Normal external auditory canals   Nose: Nasal mucosa.  No sinus tenderness.  No nasal congestion.    Throat: Oropharynx noninjected.  No lip, tongue, throat swelling. Buccal mucosa moist: yes  EYES: PERRLA, EOMI, normal optic disk,conjunctiva are clear  NECK: supple, no adenopathy, no bruits  CHEST: no chest tenderness  LUNGS: clear to auscultation  CARDIO: RRR without murmur  GI: soft, non distended. No visible peristalsis. No masses. No organomegaly. Tenderness in RLQ. Bowel sounds: normal active x 4. Guarding : no. Rigidity: no.  NEURO: Oriented times three, cranial nerves are intact, motor and sensory are grossly intact        ED Course            Labs Reviewed   [facility] POCT URINALYSIS DIPSTICK - Abnormal; Notable for the following components:       Result Value      Protein urine Trace (*)       Ketone, Urine Trace (*)       Blood, Urine Moderate (*)       All other components within normal limits    POCT ISTAT CHEM8 CARTRIDGE - Abnormal; Notable for the following components:     ISTAT BUN 6 (*)       All other components within normal limits   POCT CBC      CT ABDOMEN+PELVIS KIDNEYSTONE 2D RNDR(NO IV,NO ORAL)(CPT=74176)     Result Date: 7/31/2024  PROCEDURE:  CT ABDOMEN+PELVIS KIDNEYSTONE 2D RNDR(NO IV,NO ORAL)(CPT=74176)  COMPARISON:  [facility], MR, MRI ABDOMEN (W+WO) (CPT=74183), 6/13/2024, 9:32 PM.  INDICATIONS:  sharp stomach pain, fever  TECHNIQUE:  Unenhanced multislice CT scanning from above the kidneys to below the urinary bladder.  2D rendering are generated on the CT scanner workstation to localize potential stones in the cranio-caudal plane.  Dose reduction techniques were used. Dose information is transmitted to the ACR (American College of Radiology) NRDR (National Radiology Data Registry) which includes the Dose Index Registry.  PATIENT STATED HISTORY: (As transcribed by Technologist)  The patient has intermittent right lower abd pain for 1 month that became  worse yesterday. Fever of 100-102 yesterday. Denies N/V/D. Last BM was  yesterday.    FINDINGS:  KIDNEYS:  No mass, obstruction, or calcification. BLADDER:  No mass, calculus or significant wall thickening. ADRENALS:  No mass or enlargement.  LIVER:  No enlargement, atrophy, abnormal density, or significant focal lesion.  BILIARY:  Gallbladder is surgically absent. PANCREAS:  No lesion, fluid collection, ductal dilatation, or atrophy.  SPLEEN:  No enlargement or focal lesion.  AORTA/VASCULAR:  No aneurysm.  RETROPERITONEUM:  No mass or adenopathy.  BOWEL/MESENTERY:  No visible mass, obstruction, or bowel wall thickening.  The appendix is not identified and likely surgically absent. ABDOMINAL WALL:  No mass or hernia.  BONES:  No bony lesion or fracture. PELVIC ORGANS:  Normal for age.  LUNG BASES:  No visible pulmonary or pleural disease.  OTHER:  Negative.              CONCLUSION:  No acute abnormality in the abdomen and pelvis.     LOCATION:  [facility]   Dictated by (CST): Alen Sandoval MD on 7/31/2024 at 1:47 PM     Finalized by (CST): Alen Sandoval MD on 7/31/2024 at 1:54 PM        US PELVIS (TRANSABDOMINAL AND TRANSVAGINAL) (CPT=76856/11490)     Result Date: 7/31/2024  PROCEDURE:  US PELVIS (TRANSABDOMINAL AND TRANSVAGINAL) (CPT=76856/35850)  COMPARISON:  [facility], US PELVIS EV (TRNS ABD AND ENDOVAG) (CPT=76856,15487), 9/25/2019, 8:55 AM.  INDICATIONS:  sharp stomach pain, fever  TECHNIQUE:  Pelvic ultrasound using transabdominal and endovaginal technique.  Transvaginal ultrasound was used to better evaluate adnexal and endometrial detail.  PATIENT STATED HISTORY: (As transcribed by Technologist)  Intermittent RLQ pain.     FINDINGS:  TRANSABDOMINAL ULTRASOUND: UTERUS: The uterus measures 7.0 x 3.7 x 4.3 cm. The endometrium appears unremarkable on transabdominal images.  .   Small amount of free pelvic fluid.  TRANSVAGINAL ULTRASOUND: UTERUS:. Endometrial stripe measures:  4 mm in diameter  OVARIES: RIGHT OVARY: Right ovary measures 4.0 x 2.8 x 2.5 cm.  Numerous follicles.  LEFT OVARY: Left ovary is visualized and measures 3.1 x 2.8 x 2.2 cm.  Numerous follicles are noted.  VASCULARITY: Normal flow is documented with color Doppler imaging.              CONCLUSION:  1. Small amount of free pelvic fluid. 2. Numerous follicles within bilateral ovaries.    LOCATION:  [facility]   Dictated by (CST): Hannah Willson MD on 7/31/2024 at 12:43 PM     Finalized by (CST): Hannah Willson MD on 7/31/2024 at 12:45 PM        MRI BRAIN (W+WO) (CPT=70553)     Result Date: 7/11/2024  PROCEDURE:  MRI BRAIN (W+WO) (CPT=70553)  COMPARISON:  [facility], MRI BRAIN (W+WO) (CPT=70553), 10/05/2022, 9:23 PM.  INDICATIONS:  R42 Dizziness  TECHNIQUE:  MRI of the brain was performed with multi-planar T1, T2-weighted images with FLAIR sequences and diffusion weighted images without and with infusion.  PATIENT STATED HISTORY:(As transcribed by Technologist)  Patient  has MS and experiencing blurred vision and dizziness for a couple months.   CONTRAST USED:  20 mL of Dotarem FINDINGS:   The ventricles and sulci are within normal limits.   There is no midline shift or mass effect.   The basal cisterns are patent.   The craniocervical junction is unremarkable.   Midline structures including optic chiasm and cerebellar tonsils are overall unremarkable.  There is no acute intracranial hemorrhage or extra-axial fluid collection identified.   Demyelinating plaques in the deep ventricular white matter, mid corpus callosum body, subcortical white matter are stable.   No significant abnormal parenchymal gradient susceptibility.   There is no abnormal parenchymal or leptomeningeal enhancement.  There is no restricted diffusion to suggest acute ischemia/infarction.  The visualized paranasal sinuses and mastoid air cells are unremarkable.   The expected major intracranial flow voids are present.             CONCLUSION:   1. Mild burden of demyelinating plaques is stable.  No enhancing lesions.    LOCATION:  [facility]    Dictated by (CST): Conrado Allen MD on 7/11/2024 at 3:29 PM     Finalized by (CST): Conrado Allen MD on 7/11/2024 at 3:34 PM                  MDM   Patient presents with abdominal pain of nonemergent etiology.  No abdominal bruit, no relation to fatty meals, negative Fontenot's, no radiation to back, no CVA tenderness, no history of alcohol abuse, no history of diverticular or bloody stool.  Patient have flexors and normal bowel movements.  Patient nontoxic in appearance with normal vitals.  Unlikely AAA, cholecystitis, pancreatitis, small bowel obstruction, appendicitis, mesenteric ischemia, nephrolithiasis, phonophoresis, or diverticulitis.  No adnexal tenderness, no history of STDs, no vaginal discharge.  Not ectopic.  Unlikely TOA, ovarian torsion, PID.  Patient tolerating p.o. and able to control pain with distraction and p.o. medication.  Plan DC home with return  precautions.        Please note that this report has been produced using speech recognition software and may contain errors related to that system including, but not limited to, errors in grammar, punctuation, and spelling, as well as words and phrases that possibly may have been recognized inappropriately.  If there are any questions or concerns, contact the dictating provider for clarification.                 Medical Decision Making        Disposition and Plan      Clinical Impression:  1. Colicky RLQ abdominal pain          Disposition:  Discharge  7/31/2024  1:59 pm     Follow-up:  Alize Moeller MD  [address] [address]  [phone]                       Medications Prescribed:  Discharge Medication List as of 7/31/2024  2:06 PM              START taking these medications     Details   !! ondansetron 4 MG Oral Tablet Dispersible Take 1 tablet (4 mg total) by mouth every 4 (four) hours as needed for Nausea., Normal, Disp-10 tablet, R-0        !! - Potential duplicate medications found. Please discuss with provider.                                                                 Electronically signed by Greg Orlando APRN at 7/31/2024  3:15 PM         UC on 7/31/2024            Detailed Report          Note shared with patient  Additional Orders and Documentation      Results  Imaging         Meds           Orders           Flowsheets      Encounter Info: History,     Allergies,     Detailed Report     Chart Review: Note Routing History    No routing history on file.  Clinical Impressions      Colicky RLQ abdominal pain  Disposition      Discharge      ED/IC AVS (Printed 7/31/2024)      Follow-Ups: Follow up with Alize Moeller MD (Family Medicine)  Medication Changes      Naproxen 500 mg Oral 2 times daily PRN    Ondansetron      4 mg Oral Every 8 hours PRN  Patient not taking: Reported on 7/31/2024    4 mg Oral Every 4 hours PRN  Medication List at Discharge  Care Timeline    1131    Arrived   1224   POCT Urinalysis Dipstick    1225   POCT CBC   1235   US PELVIS (TRANSABDOMINAL AND TRANSVAGINAL) (CPT=76856/84035)   1303   POCT ISTAT chem8 cartridge    1345   CT ABDOMEN+PELVIS KIDNEYSTONE 2D RNDR(NO IV,NO ORAL)(CPT=74176)   1410   Discharged

## 2024-08-06 ENCOUNTER — TELEPHONE (OUTPATIENT)
Dept: FAMILY MEDICINE CLINIC | Facility: CLINIC | Age: 34
End: 2024-08-06

## 2024-08-06 NOTE — TELEPHONE ENCOUNTER
Patient scheduled her px and pa on 7/25/24,  in the appt notes patient states she is having \"sharp pain in right front of belly\"  patient went to IC on 7/31/24    Patient sent a [employer] MESSAGE yesterday stating she wanted to schedule her pap.  I called patient today to inform her she was already scheduled.   I asked patient if she is still having abdominal pain,  Patient states she is still having pain on an off  Sending to nurse

## 2024-08-06 NOTE — TELEPHONE ENCOUNTER
Patient was unable to schedule tomorrow with Devorah Romano.  Scheduled with Trina Gonzalez on 8/8/24  Future Appointments   Date Time Provider Department Center   8/8/2024  2:40 PM Trina Gonzalez APRN [facility] [place]   8/9/2024  9:20 AM Ami Mukherjee PA-C [facility] [address]   9/5/2024 12:40 PM Devorah Romano APRN [facility] [place]   10/17/2024  9:40 AM Maryjane Jimenez APRN [facility] [address]

## 2024-08-06 NOTE — TELEPHONE ENCOUNTER
Future Appointments   Date Time Provider Department Center   8/9/2024  9:20 AM Ami Mukherjee PA-C [facility] [address]   9/5/2024 12:40 PM Devorah Romano APRN [facility] [place]   10/17/2024  9:40 AM Maryjane Jimenez APRN [facility] [address]     Seen in IC on 7/31/24  Had CT abd and pelvis on 7/31/24    Spoke with patient  - continues to have right lower abdominal pain \"not as bad, intermittent\"  No triggering factors that make it worse but sitting for longer times.  Denies any fever

## 2024-08-08 ENCOUNTER — PATIENT MESSAGE (OUTPATIENT)
Dept: FAMILY MEDICINE CLINIC | Facility: CLINIC | Age: 34
End: 2024-08-08

## 2024-08-08 ENCOUNTER — OFFICE VISIT (OUTPATIENT)
Dept: FAMILY MEDICINE CLINIC | Facility: CLINIC | Age: 34
End: 2024-08-08
Payer: MEDICAID

## 2024-08-08 VITALS
RESPIRATION RATE: 16 BRPM | TEMPERATURE: 97 F | BODY MASS INDEX: 43.77 KG/M2 | HEIGHT: 63 IN | OXYGEN SATURATION: 96 % | WEIGHT: 247 LBS | HEART RATE: 69 BPM | SYSTOLIC BLOOD PRESSURE: 126 MMHG | DIASTOLIC BLOOD PRESSURE: 84 MMHG

## 2024-08-08 DIAGNOSIS — R10.9 RIGHT SIDED ABDOMINAL PAIN: Primary | ICD-10-CM

## 2024-08-08 LAB
BASOPHILS # BLD AUTO: 0.06 X10(3) UL (ref 0–0.2)
BASOPHILS NFR BLD AUTO: 0.8 %
CHOLEST SERPL-MCNC: 127 MG/DL (ref ?–200)
EOSINOPHIL # BLD AUTO: 0.18 X10(3) UL (ref 0–0.7)
EOSINOPHIL NFR BLD AUTO: 2.3 %
ERYTHROCYTE [DISTWIDTH] IN BLOOD BY AUTOMATED COUNT: 12.4 %
FASTING PATIENT LIPID ANSWER: YES
HCT VFR BLD AUTO: 39.5 %
HDLC SERPL-MCNC: 33 MG/DL (ref 40–59)
HGB BLD-MCNC: 13.2 G/DL
IMM GRANULOCYTES # BLD AUTO: 0.01 X10(3) UL (ref 0–1)
IMM GRANULOCYTES NFR BLD: 0.1 %
LDLC SERPL CALC-MCNC: 73 MG/DL (ref ?–100)
LIPASE SERPL-CCNC: 32 U/L (ref 12–53)
LYMPHOCYTES # BLD AUTO: 1.92 X10(3) UL (ref 1–4)
LYMPHOCYTES NFR BLD AUTO: 24.4 %
MCH RBC QN AUTO: 29.7 PG (ref 26–34)
MCHC RBC AUTO-ENTMCNC: 33.4 G/DL (ref 31–37)
MCV RBC AUTO: 89 FL
MONOCYTES # BLD AUTO: 0.71 X10(3) UL (ref 0.1–1)
MONOCYTES NFR BLD AUTO: 9 %
NEUTROPHILS # BLD AUTO: 4.99 X10 (3) UL (ref 1.5–7.7)
NEUTROPHILS # BLD AUTO: 4.99 X10(3) UL (ref 1.5–7.7)
NEUTROPHILS NFR BLD AUTO: 63.4 %
NONHDLC SERPL-MCNC: 94 MG/DL (ref ?–130)
PLATELET # BLD AUTO: 295 10(3)UL (ref 150–450)
RBC # BLD AUTO: 4.44 X10(6)UL
TRIGL SERPL-MCNC: 112 MG/DL (ref 30–149)
VLDLC SERPL CALC-MCNC: 17 MG/DL (ref 0–30)
WBC # BLD AUTO: 7.9 X10(3) UL (ref 4–11)

## 2024-08-08 PROCEDURE — 83690 ASSAY OF LIPASE: CPT | Performed by: NURSE PRACTITIONER

## 2024-08-08 PROCEDURE — 99213 OFFICE O/P EST LOW 20 MIN: CPT | Performed by: NURSE PRACTITIONER

## 2024-08-08 PROCEDURE — 85025 COMPLETE CBC W/AUTO DIFF WBC: CPT | Performed by: NURSE PRACTITIONER

## 2024-08-08 PROCEDURE — 80061 LIPID PANEL: CPT | Performed by: NURSE PRACTITIONER

## 2024-08-08 NOTE — PROGRESS NOTES
CHIEF COMPLAINT:    Chief Complaint   Patient presents with    Abdominal Pain     Intermittent abdominal pain, few months       HISTORY OF PRESENT ILLNESS:    Negar who has a history of MS presents today, August 08, 2024, for intermittent abdominal pain.    Intermittent abdominal pain began after removal of gallbladder.  In the past 4 months, has increased in frequency.  Consistent severity of pain.  Occurring randomly while sitting and while walking.  Pain is located to the right of the belly button.  Pain is sharp and stabbing.  Rated 7/10.  Bowel movements every 2 days, soft, mushy, brown.  Denies changes in menstrual cycle, nausea, vomiting, or blood in stool.    Gallbladder removal in December 27, 2022  Abdominal CT 07/31/2024 pancreas and liver within normal limits    ALLERGIES:  No Known Allergies    CURRENT MEDICATIONS:  Current Outpatient Medications   Medication Sig Dispense Refill    medroxyPROGESTERone Acetate (DEPO-PROVERA) 150 MG/ML Intramuscular Suspension Prefilled Syringe Inject 150 mg into the muscle every 3 (three) months. 1 each 0    TECFIDERA 240 MG Oral Capsule Delayed Release TAKE 1 CAPSULE IN THE MORNING AND 1 CAPSULE BEFORE BEDTIME 60 capsule 9    gabapentin 100 MG Oral Cap Week 1- take 1 capsule three times a day, Week 2- if nerve pain still present, take 2 caps three times a day, Week 3 and on- if tolerating and if nerve pain still present, take 3 caps three times a day 270 capsule 0    Cholecalciferol (VITAMIN D3) 50 MCG (2000 UT) Oral Chew Tab       Cyanocobalamin (B-12) 1000 MCG Oral Tab          MEDICAL HISTORY:  Past Medical History:    Disorder of thyroid    THYROMEGALLY    Low back pain    MS (multiple sclerosis) (HCC)    Seizure (HCC)    Seizure disorder (HCC)    as a child    Visual impairment    GLASSES     Past Surgical History:   Procedure Laterality Date    Appendectomy  02/14/2018    Appendectomy      Removal gallbladder      2021    Tonsillectomy       Family History    Problem Relation Age of Onset    Diabetes Father     Hypertension Father     Diabetes Paternal Grandfather     Breast Cancer Maternal Aunt 40    Breast Cancer Paternal Aunt 30    Breast Cancer Maternal Cousin Female 30     Family Status   Relation Status    Mo Alive    Fa Alive    PGFA (Not Specified)    Mat Aunt (Not Specified)    Pat Aunt (Not Specified)    Mat Cous Fem Other        maternal aunt breast cancer  maternal aunt mouth cancer  maternal aunt colon cancer     Social History     Socioeconomic History    Marital status: Single   Tobacco Use    Smoking status: Never    Smokeless tobacco: Never    Tobacco comments:     NON-SMOKER     Updated 6/13/24   Vaping Use    Vaping status: Former    Substances: CBD    Devices: Disposable   Substance and Sexual Activity    Alcohol use: Yes     Comment: social     Drug use: Yes     Types: Cannabis     Comment: occasionally   Other Topics Concern    Caffeine Concern No     Comment: tea sometimes    Exercise Yes     Comment: 2x per week       ROS:  GENERAL:  Denies recorded temperatures greater than 100.5F  RESPIRATORY:  Denies difficulty breathing  CARDIAC:  Denies chest pain with exertion    VITALS:   /84   Pulse 69   Temp 97.4 °F (36.3 °C) (Temporal)   Resp 16   Ht 5' 3\" (1.6 m)   Wt 247 lb (112 kg)   SpO2 96%   BMI 43.75 kg/m²     Reviewed by Trina Gonzalez MS, APRN, FNP-BC    PHYSICAL EXAM:    Constitutional:       Appears well.  Sitting upright on exam table.  Well developed, well nourished, and in no acute distress  HEENT:      Facial features symmetric. Normocephalic and atraumatic  Abdomen:       Nondistended.     Bowel sounds normoactive.     Abdomen soft, nontender, without organomegaly.       No CVA tenderness.  Musculoskeletal:         Movements smooth and controlled with appropriate coordination.       Gait is steady, nonantalgic.  Neuro:       No focal deficits, cranial nerves grossly intact.       Movements smooth and controlled,  appropriate coordination without ataxia or tremors.  Skin:     Warm and dry without jaundice or rashes.  Psychiatric:         Alert and oriented.  Calm and cooperative.  Speech is clear.     ASSESSMENT & PLAN:    1. Right sided abdominal pain  - Lipid Panel [E]; Future  - Lipase [E]; Future  - CBC W Differential W Platelet [E]; Future  - Lipid Panel [E]  - Lipase [E]  - CBC W Differential W Platelet [E]    Follow-up to be determined after review of lab results  Trial of ox bile vs   Considering GI referral

## 2024-08-09 ENCOUNTER — OFFICE VISIT (OUTPATIENT)
Facility: CLINIC | Age: 34
End: 2024-08-09
Payer: MEDICAID

## 2024-08-09 VITALS — HEART RATE: 64 BPM | WEIGHT: 249 LBS | BODY MASS INDEX: 42.51 KG/M2 | HEIGHT: 64 IN | RESPIRATION RATE: 16 BRPM

## 2024-08-09 DIAGNOSIS — E66.01 CLASS 3 SEVERE OBESITY WITH BODY MASS INDEX (BMI) OF 40.0 TO 44.9 IN ADULT, UNSPECIFIED OBESITY TYPE, UNSPECIFIED WHETHER SERIOUS COMORBIDITY PRESENT (HCC): ICD-10-CM

## 2024-08-09 DIAGNOSIS — G35 MULTIPLE SCLEROSIS (HCC): ICD-10-CM

## 2024-08-09 DIAGNOSIS — Z51.81 ENCOUNTER FOR THERAPEUTIC DRUG LEVEL MONITORING: Primary | ICD-10-CM

## 2024-08-09 DIAGNOSIS — R63.2 BINGE EATING: ICD-10-CM

## 2024-08-09 DIAGNOSIS — E78.6 LOW HDL (UNDER 40): ICD-10-CM

## 2024-08-09 DIAGNOSIS — E55.9 VITAMIN D DEFICIENCY: ICD-10-CM

## 2024-08-09 PROCEDURE — 99214 OFFICE O/P EST MOD 30 MIN: CPT | Performed by: PHYSICIAN ASSISTANT

## 2024-08-09 NOTE — PATIENT INSTRUCTIONS
1600 - 1700 calories a day    Moderate Carb (30/35/35)  124g  Protein    65g  Fats    145g  Carbs    Lower Carb (40/40/20)  166g  Protein    74g  Fats    83g  Carbs    We are here to support you with weight loss, but please remember that you still need your primary care provider for your routine health maintenance.      PLAN:  Begin Wegovy  Referral for EKG and dietician  Follow up with me in 3 months  Schedule follow up appointments: Andrzej Ackerman (dietitian) or Otilia Barfield (presurgery dietitian)   Check for insurance coverage for dietitian and labwork prior to scheduling appointment.     Please try to work on the following dietary changes:  Goals: Aim for 20-30 grams of protein/ meal  Eat 4-6 vegetables/day  Avoid skipping meals- eat every 4-5 hours  Aim for 3 meals/day  2. Drink lots of water and cut down on soda/juice consumption if soda/juice drinker  3. Focus on protein: (15-30 grams with each meal) ie. greek yogurt, cottage cheese, string cheese, hard boiled eggs  4. Healthy snacks: peanut butter and apples, hummus and carrots, berries, nuts (1/4 cup), tuna and crackers                 Protein Shakes: Premier protein or Core Power                Protein Bars: Rx Bars, Oatmega, Power Crunch                 [place] balanced breaks (cheese and nuts)- without chocolate  5. Reduce carbohydrates which includes sweets as well as rice, pasta, potatoes, bread, corn and instead choose whole grain options or more protein or vegetables (4-6 servings of vegetables per day)  6. Get a good night of sleep  7. Try to decrease stress in life     Please download apps:  1. My Fitness Pal, Lose it, My Net Diary juan pablo to help you to monitor daily dietary intake and you will be able to see if you are eating the right amount of calories, protein, carbs                With My Fitness Pal-->When you set-up the juan pablo or need to adjust settings:                Goals should include:                 Lose 1.5-2 lbs per week                 Activity level: not very active (can't count exercise towards calorie number per day)                   ** Daily INPUT> Look at nutrition section-- \"nutrients\" and it will break down your macros for the day (ie. Protein, carbs, fibers, sugars and fats). Try to stay within these numbers daily     2. \"7 minute workout\" to help with exercise/activity which takes 7 minutes of your day and that you can do at home!   3. \"Calm\" or \"Headspace\" which helps with mindfulness, meditation, clarity, sleep, and ninfa to your daily life.   4. Joppel blog for healthy recipe ideas  5. [employer] for low carb resources    HIGH PROTEIN SNACK IDEAS  -cottage cheese  -plain yogurt  -kefir  -hard-boiled eggs  -natural cheeses  -nuts (measure portion size)   -unsweetened nut butters  -dried edamame   -chaya seeds soaked in water or almond milk  -soy nuts  -cured meats (monitor for sodium issues)   -hummus with vegetables  -bean dip with vegetables     FRUIT  Low carb fruit options   Raspberries: Half a cup (60 grams) contains 3 grams of carbs.  Blackberries: Half a cup (70 grams) contains 4 grams of carbs.  Strawberries: Half a cup (100 grams) contains 6 grams of carbs.  Blueberries: Half a cup (50 grams) contains 6 grams of carbs.  Plum: One medium-sized (80 grams) contains 6 grams of carbs.     VEGETABLES  Low carb vegetables              Delicious and Healthy Snacks     Tomato and cheese plate--mix 1/2 cup   cherry tomatoes, 1 cup fresh mini mozzarella   balls, ½ cup olives     Cottage cheese & berries--top ½ cup of   cottage cheese with 1 cup of berries of   choice. Add one handful of pecans for some   extra protein, fat and fiber.      Apple + Greek Yogurt + Nut butter*--cut up   an apple and dip in 1/3 cup of Greek yogurt   and 2 tbsp of nut butter.     Hummus & veggies--dip baby carrots, pepper   strips, or snap peas in ¼ cup hummus.     Nuts--munch on 1oz of any kind of nut (about   ¼ cup). In the shell will help to slow  down!   Carrots and cheese plate-- 1 cup of baby   carrots (or veg of choice) with ½ cup cheddar   cheese cubes and 2-4 low sodium, nitrate   free turkey slices     Yogurt & berries--mix ½-1 cup berries in a   6oz container of plain or low sugar fruit   flavored 2% Greek yogurt (less than15 grams   of sugar per serving). Chobani (Less Sugar) or   Siggis are examples.     Hard boiled egg & fruit--1 to 2 hardboiled   eggs and a tangerine or other small serving   of fruit     Tuna & avocado--put 2oz of tuna (one pouch)   on 1/3 of an avocado or 2 tablespoons   guacamole and top with salsa     String cheese & veggies--one piece cheese   (¾ oz) and 1 cup snap peas or other   vegetables     Cauliflower rice & cheese--sprinkle ¼ cup   shredded cheese on 1 cup cauliflower rice   and microwave until cheese melts     Deviled eggs--3 deviled egg halves     Bean dip & veggies- ½ cup refried beans   with ¼ cup shredded cheese melted on   top. Use as a dip for celery or red pepper   strips or eat plain.     S[place]ento Balanced Breaks-or make your   own with ½ oz nuts, ½ oz cheese, and 1   teaspoon dried fruit.     Edamame with fruit and nuts--1-2   mandarin orange, ¼ cup cashews, ¼ cup   edamame      Low fat chicken, egg, or tuna salad--mix   2oz chicken, tuna, or with 1 teaspoon   mayonnaise, 1 teaspoon Fredy mustard,   and 1 teaspoon plain yogurt. Add chopped   celery, onions, walnuts, Granny Smith   apples, or dried cranberries for extra flavor.     [place] break--turkey, chicken, or nut   butter on 1 slice whole grain bread.     Protein Shake--Brice, Core Power, Orgain,   Premier Protein, Fairlife     Protein Bar--Quest, Oatmega, RX Bar, Halbur   Bars     Protein Chips - Legendary and Quest chips  These snacks contain protein,   fiber and fat to keep you full and   energized!   *If you have a peanut allergy, you can substitute with   Sun Butter (made from sunflower seeds) or WOW butter   (made from soy

## 2024-08-09 NOTE — TELEPHONE ENCOUNTER
From: Negar Gaona  To: Trina Gonzalez  Sent: 8/8/2024 9:47 PM CDT  Subject: Hello    I was in today to see you and I forgot to mention that I’ve been having pain in my shoulder where it hurts to raise up all the way and rotate it.

## 2024-08-09 NOTE — PROGRESS NOTES
HISTORY OF PRESENT ILLNESS  Chief Complaint   Patient presents with    Weight Problem     Ref by pcp,no prior wl management, open to meds       Negar Gaona is a 34 year old female new to our office today for initiation of medical weight loss program.  Patient presents today with c/o excess weight.       Has been gaining weight over the past few years  Was struggling with post partum depression  More emotional eating during that time  Snacking a lot  +overeating, +continuing to eat when not hungry, +guilt  MS    Reason/goal for weight loss: lose and maintain weight loss    Previous weight loss efforts in the past/medication: dietary changes, exercise    Interest in Bariatric surgery:     Barriers to weight loss:     Wt Readings from Last 6 Encounters:   08/09/24 249 lb (112.9 kg)   08/08/24 247 lb (112 kg)   07/31/24 240 lb (108.9 kg)   06/13/24 250 lb (113.4 kg)   05/31/24 240 lb (108.9 kg)   04/19/24 241 lb (109.3 kg)          Breakfast Lunch Dinner Snacks Fluids   skip breakfast most of the time   Bc she leaves for work so early Anything   Fast food Anything   Tries to cook  Veggies  Protein   Anything   Sweets   Crackers   Water mostly      Social hx and lifestyle reviewed:    Work: , early morning, starts work 3am  Marital status: lives with BF  Support: Yes  Tobacco use: None  ETOH use: on occasions   Supplements: Vitamin D amd B 12   Exercise: hard to get routine with work hours  Stress level: 7/10  Sleep hours and integrity: 4hrs a night    MEDICAL HISTORY  PMH reviewed:   Cardiac disorders:negative    Depression/anxiety: Yes, just started to see a therapist  Glaucoma: negative   Kidney stones: negative   Eating disorder: negative   Migraines: negative   Seizures: negative   Joint-related conditions: R shoulder, MS  Liver disease: negative   Renal disease: negative   Diabetes: negative  Thyroid disease: negative   Constipation: negative  Miscarriage: negative   DVT: negative    Family or  personal history of Pancreatic issues / Medullary Thyroid Cancer: Maternal Aunt possible pancreatic cancer  History of bariatric surgery: negative     FMH reviewed: obesity in parent/s or sibling:     REVIEW OF SYSTEMS  GENERAL: feels well otherwise  NECK: denies thickening   LUNGS: denies shortness of breath with exertion, no apnea   CARDIOVASCULAR: denies chest pain on exertion, denies palpitations or pedal edema  GI: denies abdominal pain.  No N/V/D/C  MUSCULOSKELETAL: see above  NEURO: denies headaches   PSYCH: denies change in behavior or mood, denies feeling sad or depressed     EXAM  Pulse 64   Resp 16   Ht 5' 4\" (1.626 m)   Wt 249 lb (112.9 kg)   BMI 42.74 kg/m² , Percent body fat: F >32%       GENERAL: well developed, well nourished, in no apparent distress  SKIN: warm, pink, dry without rashes to exposed area   EYES: conjunctiva pink, sclera non icteric, PERRL  HEENT: atraumatic, normocephalic, O/p: Mallampati score-   NECK: supple, non tender, no adenopathy, no thyromegaly  LUNGS: CTA in all fields, breathing non labored  CARDIO: RRR without murmur, normal S1 and S2 without clicks or gallops, no pedal edema. EKG not performed in office  GI: rotund, no masses, HSM or tenderness  MUSCULOSKELETAL: grossly intact   NEURO: Oriented times three, full ROM of bilateral UE/LE  PSYCH: pleasant, cooperative, normal mood and affect    Lab Results   Component Value Date    WBC 7.9 08/08/2024    RBC 4.44 08/08/2024    HGB 13.2 08/08/2024    HCT 39.5 08/08/2024    MCV 89.0 08/08/2024    MCH 29.7 08/08/2024    MCHC 33.4 08/08/2024    RDW 12.4 08/08/2024    .0 08/08/2024     Lab Results   Component Value Date    GLU 96 02/29/2024    BUN 11 02/29/2024    BUNCREA NOT APPLICABLE 03/10/2023    CREATSERUM 0.88 02/29/2024    ANIONGAP 4 02/29/2024    GFRNAA 94 06/20/2022    GFRAA 108 06/20/2022    CA 9.3 02/29/2024    OSMOCALC 281 02/29/2024    ALKPHO 78 02/29/2024    AST 19 02/29/2024    ALT 27 02/29/2024    BILT 0.5  02/29/2024    TP 8.1 02/29/2024    ALB 3.8 02/29/2024    GLOBULIN 4.3 02/29/2024    AGRATIO 1.4 03/10/2023     02/29/2024    K 4.1 02/29/2024     02/29/2024    CO2 24.0 02/29/2024     Lab Results   Component Value Date    EAG 94 02/29/2024    A1C 4.9 02/29/2024     Lab Results   Component Value Date    CHOLEST 127 08/08/2024    TRIG 112 08/08/2024    HDL 33 (L) 08/08/2024    LDL 73 08/08/2024    VLDL 17 08/08/2024    NONHDLC 94 08/08/2024     Lab Results   Component Value Date    TSH 3.740 10/05/2022    TSHT4 1.34 03/10/2023     Lab Results   Component Value Date    VITB12 339 03/10/2023     Lab Results   Component Value Date    VITD 22.8 (L) 02/29/2024       Current Outpatient Medications on File Prior to Visit   Medication Sig Dispense Refill    medroxyPROGESTERone Acetate (DEPO-PROVERA) 150 MG/ML Intramuscular Suspension Prefilled Syringe Inject 150 mg into the muscle every 3 (three) months. 1 each 0    TECFIDERA 240 MG Oral Capsule Delayed Release TAKE 1 CAPSULE IN THE MORNING AND 1 CAPSULE BEFORE BEDTIME 60 capsule 9    Cholecalciferol (VITAMIN D3) 50 MCG (2000 UT) Oral Chew Tab       Cyanocobalamin (B-12) 1000 MCG Oral Tab       gabapentin 100 MG Oral Cap Week 1- take 1 capsule three times a day, Week 2- if nerve pain still present, take 2 caps three times a day, Week 3 and on- if tolerating and if nerve pain still present, take 3 caps three times a day (Patient not taking: Reported on 8/9/2024) 270 capsule 0     No current facility-administered medications on file prior to visit.       ASSESSMENT  Initial Weight Data and Goal Weight Loss:  Weight Calculations  Initial Weight: 249 lbs  Initial Weight Date: 08/09/24  Today's Weight: 249 lbs  5% Goal: 12.45  10% Goal: 24.9  Total Weight Loss: 0 lbs    Diagnoses and all orders for this visit:    Encounter for therapeutic drug level monitoring  -     semaglutide-weight management 0.25 MG/0.5ML Subcutaneous Solution Auto-injector; Inject 0.5 mL (0.25 mg  total) into the skin once a week for 4 doses.  -     EKG 12 Lead performed at [facility]; Future  -     [facility] Dietician Referral  ([facility] USE ONLY)    Class 3 severe obesity with body mass index (BMI) of 40.0 to 44.9 in adult, unspecified obesity type, unspecified whether serious comorbidity present (HCC)  -     semaglutide-weight management 0.25 MG/0.5ML Subcutaneous Solution Auto-injector; Inject 0.5 mL (0.25 mg total) into the skin once a week for 4 doses.  -     EKG 12 Lead performed at [facility]; Future  -     [facility] Dietician Referral  ([facility] USE ONLY)    Multiple sclerosis (HCC)  -     semaglutide-weight management 0.25 MG/0.5ML Subcutaneous Solution Auto-injector; Inject 0.5 mL (0.25 mg total) into the skin once a week for 4 doses.  -     EKG 12 Lead performed at [facility]; Future  -     [facility] Dietician Referral  ([facility] USE ONLY)    Vitamin D deficiency  -     semaglutide-weight management 0.25 MG/0.5ML Subcutaneous Solution Auto-injector; Inject 0.5 mL (0.25 mg total) into the skin once a week for 4 doses.  -     EKG 12 Lead performed at [facility]; Future  -     [facility] Dietician Referral  ([facility] USE ONLY)    Binge eating    Low HDL (under 40)        PLAN  Initial Weight: 249 lbs  Initial Weight Date: 08/09/24  Today's Weight: 249 lbs  5% Goal: 12.45  10% Goal: 24.9  Total Weight Loss: 0 lbs    Will begin Wegovy 0.25mg weekly - denies any personal or family history of pancreatitis, pancreatic cancer, thyroid cancer, MEN2 - discussed MOA, advised side effects and adverse effects of medication.  BEDs- 7 questionnaire was positive for binge eating disorder   Referral for outpatient EKG  Discussed with patient if Wegovy is not covered we will try Vyvanse  Vitamin D supplement, take with food  Low HDL-discussed importance of physical activity and recent missed number  Begin logging foods, macronutrient goals discussed  -low carb high protein  -portion control  -Limit carbohydrates  -Eat breakfast every day    -Don't skip meals   -Read nutrition labels and keep a food log  -drink a lot of water 65 oz of water per day  - Do not drink your calories (no soda, juice, high calorie coffee drinks, limit alcohol)  - Do not eat late at night  Medication use and side effects reviewed with patient.  Medication contraindications: EKG prior to stimulant  Follow up with dietitian and psychologist as recommended.  Discussed the role of sleep and stress in weight management.  Labs orders as above.  Counseled on comprehensive weight loss plan including attention to nutrition, exercise and behavior/stress management for success. See patient instruction below for more details.  Weight Loss Consent to treat reviewed and signed.    Total time spent on chart review, pre-charting, obtaining history, counseling, and educating, reviewing labs was 45 minutes.      Patient Instructions   1600 - 1700 calories a day    Moderate Carb (30/35/35)  124g  Protein    65g  Fats    145g  Carbs    Lower Carb (40/40/20)  166g  Protein    74g  Fats    83g  Carbs    We are here to support you with weight loss, but please remember that you still need your primary care provider for your routine health maintenance.      PLAN:  Begin Wegovy  Referral for EKG and dietician  Follow up with me in 3 months  Schedule follow up appointments: Andrzej Ackerman (dietitian) or Otilia Barfield (presurgery dietitian)   Check for insurance coverage for dietitian and labwork prior to scheduling appointment.     Please try to work on the following dietary changes:  Goals: Aim for 20-30 grams of protein/ meal  Eat 4-6 vegetables/day  Avoid skipping meals- eat every 4-5 hours  Aim for 3 meals/day  2. Drink lots of water and cut down on soda/juice consumption if soda/juice drinker  3. Focus on protein: (15-30 grams with each meal) ie. greek yogurt, cottage cheese, string cheese, hard boiled eggs  4. Healthy snacks: peanut butter and apples, hummus and carrots, berries, nuts (1/4 cup), tuna  and crackers                 Protein Shakes: Premier protein or Core Power                Protein Bars: Rx Bars, Oatmega, Power Crunch                 S[place]ento balanced breaks (cheese and nuts)- without chocolate  5. Reduce carbohydrates which includes sweets as well as rice, pasta, potatoes, bread, corn and instead choose whole grain options or more protein or vegetables (4-6 servings of vegetables per day)  6. Get a good night of sleep  7. Try to decrease stress in life     Please download apps:  1. My Fitness Pal, Lose it, My Net Diary juan pablo to help you to monitor daily dietary intake and you will be able to see if you are eating the right amount of calories, protein, carbs                With My Fitness Pal-->When you set-up the juan pablo or need to adjust settings:                Goals should include:                 Lose 1.5-2 lbs per week                Activity level: not very active (can't count exercise towards calorie number per day)                   ** Daily INPUT> Look at nutrition section-- \"nutrients\" and it will break down your macros for the day (ie. Protein, carbs, fibers, sugars and fats). Try to stay within these numbers daily     2. \"7 minute workout\" to help with exercise/activity which takes 7 minutes of your day and that you can do at home!   3. \"Calm\" or \"Headspace\" which helps with mindfulness, meditation, clarity, sleep, and ninfa to your daily life.   4. Earbitse blog for healthy recipe ideas  5. DietMiaopai for low carb resources    HIGH PROTEIN SNACK IDEAS  -cottage cheese  -plain yogurt  -kefir  -hard-boiled eggs  -natural cheeses  -nuts (measure portion size)   -unsweetened nut butters  -dried edamame   -chaya seeds soaked in water or almond milk  -soy nuts  -cured meats (monitor for sodium issues)   -hummus with vegetables  -bean dip with vegetables     FRUIT  Low carb fruit options   Raspberries: Half a cup (60 grams) contains 3 grams of carbs.  Blackberries: Half a cup (70 grams)  contains 4 grams of carbs.  Strawberries: Half a cup (100 grams) contains 6 grams of carbs.  Blueberries: Half a cup (50 grams) contains 6 grams of carbs.  Plum: One medium-sized (80 grams) contains 6 grams of carbs.     VEGETABLES  Low carb vegetables              Delicious and Healthy Snacks     Tomato and cheese plate--mix 1/2 cup   cherry tomatoes, 1 cup fresh mini mozzarella   balls, ½ cup olives     Cottage cheese & berries--top ½ cup of   cottage cheese with 1 cup of berries of   choice. Add one handful of pecans for some   extra protein, fat and fiber.      Apple + Greek Yogurt + Nut butter*--cut up   an apple and dip in 1/3 cup of Greek yogurt   and 2 tbsp of nut butter.     Hummus & veggies--dip baby carrots, pepper   strips, or snap peas in ¼ cup hummus.     Nuts--munch on 1oz of any kind of nut (about   ¼ cup). In the shell will help to slow down!   Carrots and cheese plate-- 1 cup of baby   carrots (or veg of choice) with ½ cup cheddar   cheese cubes and 2-4 low sodium, nitrate   free turkey slices     Yogurt & berries--mix ½-1 cup berries in a   6oz container of plain or low sugar fruit   flavored 2% Greek yogurt (less than15 grams   of sugar per serving). Chobani (Less Sugar) or   Siggis are examples.     Hard boiled egg & fruit--1 to 2 hardboiled   eggs and a tangerine or other small serving   of fruit     Tuna & avocado--put 2oz of tuna (one pouch)   on 1/3 of an avocado or 2 tablespoons   guacamole and top with salsa     String cheese & veggies--one piece cheese   (¾ oz) and 1 cup snap peas or other   vegetables     Cauliflower rice & cheese--sprinkle ¼ cup   shredded cheese on 1 cup cauliflower rice   and microwave until cheese melts     Deviled eggs--3 deviled egg halves     Bean dip & veggies- ½ cup refried beans   with ¼ cup shredded cheese melted on   top. Use as a dip for celery or red pepper   strips or eat plain.     Sargento Balanced Breaks-or make your   own with ½ oz nuts, ½ oz cheese,  and 1   teaspoon dried fruit.     Edamame with fruit and nuts--1-2   mandarin orange, ¼ cup cashews, ¼ cup   edamame      Low fat chicken, egg, or tuna salad--mix   2oz chicken, tuna, or with 1 teaspoon   mayonnaise, 1 teaspoon Fredy mustard,   and 1 teaspoon plain yogurt. Add chopped   celery, onions, walnuts, Granny Smith   apples, or dried cranberries for extra flavor.     Riley break--turkey, chicken, or nut   butter on 1 slice whole grain bread.     Protein Shake--Brice, Core Power, Orgain,   Premier Protein, Fairlife     Protein Bar--Quest, Oatmega, RX Bar, [place]   Bars     Protein Chips - Legendary and Quest chips  These snacks contain protein,   fiber and fat to keep you full and   energized!   *If you have a peanut allergy, you can substitute with   Sun Butter (made from sunflower seeds) or WOW butter   (made from soy      No follow-ups on file.    Patient verbalizes understanding.    Ami Mukherjee PA-C  8/9/2024

## 2024-08-12 NOTE — TELEPHONE ENCOUNTER
Can we set up an appointment to collect additional information?    I saw her for right sided abdominal pain

## 2024-08-13 ENCOUNTER — TELEPHONE (OUTPATIENT)
Dept: INTERNAL MEDICINE CLINIC | Facility: CLINIC | Age: 34
End: 2024-08-13

## 2024-08-13 DIAGNOSIS — E66.01 CLASS 3 SEVERE OBESITY WITH BODY MASS INDEX (BMI) OF 40.0 TO 44.9 IN ADULT, UNSPECIFIED OBESITY TYPE, UNSPECIFIED WHETHER SERIOUS COMORBIDITY PRESENT (HCC): ICD-10-CM

## 2024-08-13 DIAGNOSIS — R63.2 BINGE EATING: ICD-10-CM

## 2024-08-13 DIAGNOSIS — Z51.81 ENCOUNTER FOR THERAPEUTIC DRUG LEVEL MONITORING: Primary | ICD-10-CM

## 2024-08-15 ENCOUNTER — PATIENT MESSAGE (OUTPATIENT)
Dept: FAMILY MEDICINE CLINIC | Facility: CLINIC | Age: 34
End: 2024-08-15

## 2024-08-16 NOTE — TELEPHONE ENCOUNTER
Left message detail message per (HIPPA form) with recommendation. Patient to call back if they have any questions

## 2024-08-16 NOTE — TELEPHONE ENCOUNTER
Can we call to inform Negar of low HDL    There is no medication for this specifically; however, lifestyle modifications such as increased activity will help this level increase.    She can consder omega 3 fatty acid if she'd like and/or implement the plate method (1/2 fruits/veggies, 1/4 protein, 1/4 carb)

## 2024-08-16 NOTE — TELEPHONE ENCOUNTER
From: Negar Gaona  To: Trina Gonzalez  Sent: 8/15/2024 3:05 PM CDT  Subject: Hi    I just have a question about the dunne cholesterol? It pretty low that normal?

## 2024-08-28 RX ORDER — LISDEXAMFETAMINE DIMESYLATE 30 MG/1
30 CAPSULE ORAL DAILY
Qty: 30 CAPSULE | Refills: 0 | Status: SHIPPED | OUTPATIENT
Start: 2024-08-28 | End: 2024-08-30 | Stop reason: ALTCHOICE

## 2024-08-28 RX ORDER — LISDEXAMFETAMINE DIMESYLATE 30 MG/1
30 CAPSULE ORAL DAILY
Qty: 30 CAPSULE | Refills: 0 | Status: SHIPPED | OUTPATIENT
Start: 2024-10-29 | End: 2024-08-30 | Stop reason: ALTCHOICE

## 2024-08-28 RX ORDER — LISDEXAMFETAMINE DIMESYLATE 30 MG/1
30 CAPSULE ORAL DAILY
Qty: 30 CAPSULE | Refills: 0 | Status: SHIPPED | OUTPATIENT
Start: 2024-09-28 | End: 2024-08-30 | Stop reason: ALTCHOICE

## 2024-08-28 NOTE — TELEPHONE ENCOUNTER
I know we had discussed if wegovy was not covered trying vyvanse.  I placed an order for an EKG at initial appointment that she should get done.    We can start a low dose of vyvanse - discussed at OV side effects including but not limited to:( elevated BP, tremor, anxiety, headache, constipation and serious side effects including arrhythmia and cad ) patient verbalized understanding agrees with the plan

## 2024-08-30 ENCOUNTER — OFFICE VISIT (OUTPATIENT)
Dept: FAMILY MEDICINE CLINIC | Facility: CLINIC | Age: 34
End: 2024-08-30
Payer: MEDICAID

## 2024-08-30 VITALS
BODY MASS INDEX: 41.83 KG/M2 | OXYGEN SATURATION: 99 % | TEMPERATURE: 98 F | RESPIRATION RATE: 15 BRPM | SYSTOLIC BLOOD PRESSURE: 125 MMHG | HEIGHT: 64 IN | WEIGHT: 245 LBS | HEART RATE: 65 BPM | DIASTOLIC BLOOD PRESSURE: 85 MMHG

## 2024-08-30 DIAGNOSIS — L73.1 INGROWN HAIR: Primary | ICD-10-CM

## 2024-08-30 PROCEDURE — 99214 OFFICE O/P EST MOD 30 MIN: CPT

## 2024-08-30 RX ORDER — CEPHALEXIN 500 MG/1
500 CAPSULE ORAL 3 TIMES DAILY
Qty: 30 CAPSULE | Refills: 1 | Status: SHIPPED | OUTPATIENT
Start: 2024-08-30

## 2024-08-30 RX ORDER — GINSENG 100 MG
CAPSULE ORAL
Qty: 14.2 G | Refills: 0 | Status: SHIPPED | OUTPATIENT
Start: 2024-08-30

## 2024-08-30 NOTE — PROGRESS NOTES
Negar Gaona is a 34 year old female.  HPI:     Patient in office for sore to the right side of her groin that started 2 weeks ago.  She reports it seemed to pop and drain some fluid.  She tried peroxide and covered spot with a band aid with some improvement.  Reports symptoms have improved but it is still cauing her pain.    Current Outpatient Medications   Medication Sig Dispense Refill    lisdexamfetamine (VYVANSE) 30 MG Oral Cap Take 1 capsule (30 mg total) by mouth daily. 30 capsule 0    [START ON 9/28/2024] lisdexamfetamine (VYVANSE) 30 MG Oral Cap Take 1 capsule (30 mg total) by mouth daily. 30 capsule 0    [START ON 10/29/2024] lisdexamfetamine (VYVANSE) 30 MG Oral Cap Take 1 capsule (30 mg total) by mouth daily. 30 capsule 0    semaglutide-weight management 0.25 MG/0.5ML Subcutaneous Solution Auto-injector Inject 0.5 mL (0.25 mg total) into the skin once a week for 4 doses. 2 mL 0    medroxyPROGESTERone Acetate (DEPO-PROVERA) 150 MG/ML Intramuscular Suspension Prefilled Syringe Inject 150 mg into the muscle every 3 (three) months. 1 each 0    TECFIDERA 240 MG Oral Capsule Delayed Release TAKE 1 CAPSULE IN THE MORNING AND 1 CAPSULE BEFORE BEDTIME 60 capsule 9    gabapentin 100 MG Oral Cap Week 1- take 1 capsule three times a day, Week 2- if nerve pain still present, take 2 caps three times a day, Week 3 and on- if tolerating and if nerve pain still present, take 3 caps three times a day (Patient not taking: Reported on 8/9/2024) 270 capsule 0    Cholecalciferol (VITAMIN D3) 50 MCG (2000 UT) Oral Chew Tab       Cyanocobalamin (B-12) 1000 MCG Oral Tab         Past Medical History:    Disorder of thyroid    THYROMEGALLY    Low back pain    MS (multiple sclerosis) (HCC)    Seizure (HCC)    Seizure disorder (HCC)    as a child    Visual impairment    GLASSES      Social History:  Social History     Socioeconomic History    Marital status: Single   Tobacco Use    Smoking status: Never    Smokeless  tobacco: Never    Tobacco comments:     NON-SMOKER     Updated 6/13/24   Vaping Use    Vaping status: Former    Substances: CBD    Devices: Disposable   Substance and Sexual Activity    Alcohol use: Yes     Comment: social     Drug use: Yes     Types: Cannabis     Comment: occasionally   Other Topics Concern    Caffeine Concern No     Comment: tea sometimes    Exercise Yes     Comment: 2x per week          REVIEW OF SYSTEMS:   GENERAL HEALTH: feels well otherwise  SKIN: see hpi  RESPIRATORY: denies shortness of breath with exertion  CARDIOVASCULAR: denies chest pain on exertion  GI: denies abdominal pain and denies heartburn  NEURO: denies headaches    EXAM:   /85   Pulse 65   Temp 97.8 °F (36.6 °C) (Temporal)   Resp 15   Ht 5' 4\" (1.626 m)   Wt 245 lb (111.1 kg)   SpO2 99%   BMI 42.05 kg/m²     Physical Exam  Constitutional:       Appearance: Normal appearance. She is normal weight.   Cardiovascular:      Rate and Rhythm: Normal rate and regular rhythm.   Pulmonary:      Effort: Pulmonary effort is normal.      Breath sounds: Normal breath sounds.   Genitourinary:     General: Normal vulva.      Rectum: Normal.          Comments: Ingrown hair/small abscess   Musculoskeletal:      Cervical back: Normal range of motion.   Skin:     General: Skin is warm and dry.      Capillary Refill: Capillary refill takes less than 2 seconds.   Neurological:      Mental Status: She is alert and oriented to person, place, and time.   Psychiatric:         Mood and Affect: Mood normal.         Behavior: Behavior normal.          ASSESSMENT AND PLAN:     1. Ingrown hair  Keflex and bactrim sent to pharmacy and instructions provided.  Printed instructions provided as well.  - cephALEXin 500 MG Oral Cap; Take 1 capsule (500 mg total) by mouth 3 (three) times daily.  Dispense: 30 capsule; Refill: 1  - bacitracin 500 UNIT/GM External Ointment; Apply to wound twice a day for ten days  Dispense: 14.2 g; Refill: 0    The patient  indicates understanding of these issues and agrees to the plan.  The patient is asked to return in 10 days if symptoms are not improving.    Alexandrea Ellington, APRN  8/30/2024

## 2024-09-05 ENCOUNTER — OFFICE VISIT (OUTPATIENT)
Dept: FAMILY MEDICINE CLINIC | Facility: CLINIC | Age: 34
End: 2024-09-05
Payer: MEDICAID

## 2024-09-05 ENCOUNTER — PATIENT MESSAGE (OUTPATIENT)
Dept: NEUROLOGY | Facility: CLINIC | Age: 34
End: 2024-09-05

## 2024-09-05 VITALS
BODY MASS INDEX: 43.41 KG/M2 | DIASTOLIC BLOOD PRESSURE: 76 MMHG | OXYGEN SATURATION: 99 % | TEMPERATURE: 98 F | WEIGHT: 245 LBS | SYSTOLIC BLOOD PRESSURE: 126 MMHG | HEART RATE: 86 BPM | HEIGHT: 63 IN

## 2024-09-05 DIAGNOSIS — Z13.820 SCREENING FOR OSTEOPOROSIS: ICD-10-CM

## 2024-09-05 DIAGNOSIS — N94.10 PAIN IN FEMALE GENITALIA ON INTERCOURSE: ICD-10-CM

## 2024-09-05 DIAGNOSIS — Z30.42 DEPO-PROVERA CONTRACEPTIVE STATUS: ICD-10-CM

## 2024-09-05 DIAGNOSIS — Z12.4 CERVICAL CANCER SCREENING: ICD-10-CM

## 2024-09-05 DIAGNOSIS — Z11.3 SCREEN FOR STD (SEXUALLY TRANSMITTED DISEASE): ICD-10-CM

## 2024-09-05 DIAGNOSIS — E66.01 CLASS 3 SEVERE OBESITY DUE TO EXCESS CALORIES WITHOUT SERIOUS COMORBIDITY WITH BODY MASS INDEX (BMI) OF 40.0 TO 44.9 IN ADULT (HCC): ICD-10-CM

## 2024-09-05 DIAGNOSIS — Z00.00 GENERAL MEDICAL EXAM: Primary | ICD-10-CM

## 2024-09-05 PROCEDURE — 88175 CYTOPATH C/V AUTO FLUID REDO: CPT | Performed by: NURSE PRACTITIONER

## 2024-09-05 PROCEDURE — 87624 HPV HI-RISK TYP POOLED RSLT: CPT | Performed by: NURSE PRACTITIONER

## 2024-09-05 PROCEDURE — 87491 CHLMYD TRACH DNA AMP PROBE: CPT | Performed by: NURSE PRACTITIONER

## 2024-09-05 PROCEDURE — 87591 N.GONORRHOEAE DNA AMP PROB: CPT | Performed by: NURSE PRACTITIONER

## 2024-09-05 PROCEDURE — 99395 PREV VISIT EST AGE 18-39: CPT | Performed by: NURSE PRACTITIONER

## 2024-09-05 PROCEDURE — 99213 OFFICE O/P EST LOW 20 MIN: CPT | Performed by: NURSE PRACTITIONER

## 2024-09-05 RX ORDER — LISDEXAMFETAMINE DIMESYLATE 30 MG/1
CAPSULE ORAL
COMMUNITY
Start: 2024-08-31

## 2024-09-05 NOTE — TELEPHONE ENCOUNTER
From: Negar Gaona  To: Nicky Ochoa  Sent: 9/5/2024 1:48 PM CDT  Subject: Hello    I just wanted to know when I was suppose to schedule an appointment with the Dr. I thought I had but I don’t see it so I must have imagine I did and forgot to. When was my last appt with her?

## 2024-09-05 NOTE — PROGRESS NOTES
HPI:   Patient is here for physical.    Concerns:   Weird feeling that she has been having during sex and orgasm. She reports that it is cramped and pinching on the inside of the vaginal wall on the right side. This has been happening for about a month. Stayed the same since onset.   Wants a pap because she reports that she gets them every year. Apparently she had an abnormal pap at 23 years old.   Has been on the depo shot for 16 years minus the year that she had her son     LMP: no period on depo   Menstrual Cycle Length: n/a  Contraception: depo   Last Pap: 2023     Exercise: none.    Diet: watches yoone  Occupation:      Wt Readings from Last 6 Encounters:   09/05/24 245 lb (111.1 kg)   08/30/24 245 lb (111.1 kg)   08/09/24 249 lb (112.9 kg)   08/08/24 247 lb (112 kg)   07/31/24 240 lb (108.9 kg)   06/13/24 250 lb (113.4 kg)     Body mass index is 43.4 kg/m².     Cholesterol, Total (mg/dL)   Date Value   08/08/2024 127   06/14/2023 158   06/20/2022 127     HDL Cholesterol (mg/dL)   Date Value   08/08/2024 33 (L)   06/14/2023 51   06/20/2022 44     LDL Cholesterol (mg/dL)   Date Value   08/08/2024 73   06/14/2023 92   06/20/2022 73     AST (U/L)   Date Value   02/29/2024 19   03/10/2023 17   10/05/2022 30   06/20/2022 13 (L)     ALT (U/L)   Date Value   02/29/2024 27   03/10/2023 12   10/05/2022 22   06/20/2022 17        Current Outpatient Medications   Medication Sig Dispense Refill    VYVANSE 30 MG Oral Cap       cephALEXin 500 MG Oral Cap Take 1 capsule (500 mg total) by mouth 3 (three) times daily. 30 capsule 1    bacitracin 500 UNIT/GM External Ointment Apply to wound twice a day for ten days 14.2 g 0    medroxyPROGESTERone Acetate (DEPO-PROVERA) 150 MG/ML Intramuscular Suspension Prefilled Syringe Inject 150 mg into the muscle every 3 (three) months. 1 each 0    TECFIDERA 240 MG Oral Capsule Delayed Release TAKE 1 CAPSULE IN THE MORNING AND 1 CAPSULE BEFORE BEDTIME 60 capsule 9     Cholecalciferol (VITAMIN D3) 50 MCG (2000 UT) Oral Chew Tab       Cyanocobalamin (B-12) 1000 MCG Oral Tab       gabapentin 100 MG Oral Cap Week 1- take 1 capsule three times a day, Week 2- if nerve pain still present, take 2 caps three times a day, Week 3 and on- if tolerating and if nerve pain still present, take 3 caps three times a day (Patient not taking: Reported on 8/9/2024) 270 capsule 0      Past Medical History:    Disorder of thyroid    THYROMEGALLY    Low back pain    MS (multiple sclerosis) (HCC)    Seizure (HCC)    Seizure disorder (HCC)    as a child    Visual impairment    GLASSES      Past Surgical History:   Procedure Laterality Date    Appendectomy  02/14/2018    Appendectomy      Removal gallbladder      2021    Tonsillectomy        Family History   Problem Relation Age of Onset    Diabetes Father     Hypertension Father     Diabetes Paternal Grandfather     Breast Cancer Maternal Aunt 40    Breast Cancer Paternal Aunt 30    Breast Cancer Maternal Cousin Female 30      Social History:   Social History     Socioeconomic History    Marital status: Single   Tobacco Use    Smoking status: Never    Smokeless tobacco: Never    Tobacco comments:     NON-SMOKER     Updated 6/13/24   Vaping Use    Vaping status: Former    Substances: CBD    Devices: Disposable   Substance and Sexual Activity    Alcohol use: Yes     Comment: social     Drug use: Yes     Types: Cannabis     Comment: occasionally   Other Topics Concern    Caffeine Concern No     Comment: tea sometimes    Exercise Yes     Comment: 2x per week        REVIEW OF SYSTEMS:   Review of Systems   Constitutional:  Positive for unexpected weight change. Negative for appetite change.        Gained 20 lbs within a month with no diet change    HENT:  Negative for hearing loss.    Eyes:  Negative for visual disturbance.   Respiratory:  Negative for chest tightness and shortness of breath.    Cardiovascular:  Negative for chest pain and palpitations.    Gastrointestinal:  Negative for constipation, diarrhea, nausea and vomiting.   Genitourinary:  Positive for vaginal pain. Negative for menstrual problem, pelvic pain, vaginal bleeding and vaginal discharge.   Musculoskeletal:  Negative for joint swelling and myalgias.   Skin:  Negative for rash and wound.   Neurological:  Negative for dizziness, light-headedness and headaches.        Seizures as a child    Psychiatric/Behavioral:  Negative for agitation, behavioral problems and confusion.        EXAM:   /76   Pulse 86   Temp 98 °F (36.7 °C)   Ht 5' 3\" (1.6 m)   Wt 245 lb (111.1 kg)   SpO2 99%   BMI 43.40 kg/m²   Ideal body weight: 52.4 kg (115 lb 8.3 oz)  Adjusted ideal body weight: 75.9 kg (167 lb 5 oz)   Physical Exam  Exam conducted with a chaperone present (Stacy Reid [employer] student).   Constitutional:       General: She is not in acute distress.     Appearance: Normal appearance. She is obese. She is not ill-appearing.   HENT:      Head: Normocephalic and atraumatic.      Right Ear: Tympanic membrane normal. There is no impacted cerumen.      Left Ear: Tympanic membrane normal. There is no impacted cerumen.      Nose: No congestion or rhinorrhea.      Mouth/Throat:      Pharynx: No oropharyngeal exudate or posterior oropharyngeal erythema.   Eyes:      Pupils: Pupils are equal, round, and reactive to light.   Cardiovascular:      Rate and Rhythm: Normal rate and regular rhythm.      Pulses: Normal pulses.      Heart sounds: Normal heart sounds. No murmur heard.     No friction rub. No gallop.   Pulmonary:      Effort: Pulmonary effort is normal. No respiratory distress.      Breath sounds: Normal breath sounds. No wheezing.   Abdominal:      General: Bowel sounds are normal. There is no distension.      Palpations: Abdomen is soft.      Tenderness: There is no abdominal tenderness.   Genitourinary:     Labia:         Right: No rash, tenderness or lesion.         Left: No rash, tenderness or lesion.        Vagina: Vaginal discharge (scant, clear) present. No erythema, tenderness or lesions.      Cervix: Normal. No friability or erythema.      Uterus: Normal.       Adnexa: Right adnexa normal and left adnexa normal.      Comments: Difficult bimanual exam due to body habitus  Musculoskeletal:         General: No swelling or tenderness. Normal range of motion.   Skin:     General: Skin is warm and dry.   Neurological:      General: No focal deficit present.      Mental Status: She is alert and oriented to person, place, and time. Mental status is at baseline.   Psychiatric:         Mood and Affect: Mood normal.         Behavior: Behavior normal.         Thought Content: Thought content normal.         Judgment: Judgment normal.         ASSESSMENT AND PLAN:   Diagnoses and all orders for this visit:    General medical exam    Cervical cancer screening  -     ThinPrep PAP Smear; Future  -     Hpv Dna  High Risk , Thin Prep Collect; Future    Screen for STD (sexually transmitted disease)  -     Chlamydia/Gc Amplification [E]; Future    Screening for osteoporosis  -     XR DEXA BONE DENSITOMETRY (CPT=77080); Future    Class 3 severe obesity due to excess calories without serious comorbidity with body mass index (BMI) of 40.0 to 44.9 in adult (HCC)  -     XR DEXA BONE DENSITOMETRY (CPT=77080); Future    Depo-Provera contraceptive status  -     [facility]G Referral - [facility] ([place])    Pain in female genitalia on intercourse  -     [facility]G Referral - [facility]  -     US PELVIS (TRANSABDOMINAL AND TRANSVAGINAL) (CPT=76856/73381); Future      Pap and STD screen done today  No obvious cause for painful intercourse on exam today  Pelvic us ordered  Recommend seeing gyne for pain and to discuss contraception  Typically would not recommend Depo use longer than 5 years  Should also get bone density scan    This patient was initially evaluated by Veena HUGHES    Patient discussed with student. Independent exam performed. Agree with  assessment and plan.

## 2024-09-06 ENCOUNTER — EKG ENCOUNTER (OUTPATIENT)
Dept: LAB | Facility: HOSPITAL | Age: 34
End: 2024-09-06
Attending: PHYSICIAN ASSISTANT
Payer: MEDICAID

## 2024-09-06 ENCOUNTER — HOSPITAL ENCOUNTER (OUTPATIENT)
Dept: BONE DENSITY | Facility: HOSPITAL | Age: 34
Discharge: HOME | End: 2024-09-06
Attending: NURSE PRACTITIONER
Payer: MEDICAID

## 2024-09-06 DIAGNOSIS — Z13.820 SCREENING FOR OSTEOPOROSIS: ICD-10-CM

## 2024-09-06 DIAGNOSIS — E55.9 VITAMIN D DEFICIENCY: ICD-10-CM

## 2024-09-06 DIAGNOSIS — R20.0 RIGHT LEG NUMBNESS: ICD-10-CM

## 2024-09-06 DIAGNOSIS — R20.0 NUMBNESS AND TINGLING IN BOTH HANDS: ICD-10-CM

## 2024-09-06 DIAGNOSIS — G35 MULTIPLE SCLEROSIS (HCC): ICD-10-CM

## 2024-09-06 DIAGNOSIS — Z51.81 ENCOUNTER FOR THERAPEUTIC DRUG MONITORING: Primary | ICD-10-CM

## 2024-09-06 DIAGNOSIS — R20.2 NUMBNESS AND TINGLING IN BOTH HANDS: ICD-10-CM

## 2024-09-06 DIAGNOSIS — E66.01 CLASS 3 SEVERE OBESITY DUE TO EXCESS CALORIES WITHOUT SERIOUS COMORBIDITY WITH BODY MASS INDEX (BMI) OF 40.0 TO 44.9 IN ADULT (HCC): ICD-10-CM

## 2024-09-06 DIAGNOSIS — G35 MS (MULTIPLE SCLEROSIS) (HCC): ICD-10-CM

## 2024-09-06 DIAGNOSIS — Z51.81 ENCOUNTER FOR THERAPEUTIC DRUG MONITORING: ICD-10-CM

## 2024-09-06 LAB
ALBUMIN SERPL-MCNC: 4.4 G/DL (ref 3.2–4.8)
ALBUMIN/GLOB SERPL: 1.3 {RATIO} (ref 1–2)
ALP LIVER SERPL-CCNC: 76 U/L
ALT SERPL-CCNC: 26 U/L
ANION GAP SERPL CALC-SCNC: 11 MMOL/L (ref 0–18)
AST SERPL-CCNC: 65 U/L (ref ?–34)
ATRIAL RATE: 92 BPM
BASOPHILS # BLD AUTO: 0.05 X10(3) UL (ref 0–0.2)
BASOPHILS NFR BLD AUTO: 0.6 %
BILIRUB SERPL-MCNC: 0.7 MG/DL (ref 0.3–1.2)
BUN BLD-MCNC: 10 MG/DL (ref 9–23)
BUN/CREAT SERPL: 11.5 (ref 10–20)
CALCIUM BLD-MCNC: 9.2 MG/DL (ref 8.7–10.4)
CHLORIDE SERPL-SCNC: 106 MMOL/L (ref 98–112)
CO2 SERPL-SCNC: 22 MMOL/L (ref 21–32)
CREAT BLD-MCNC: 0.87 MG/DL
DEPRECATED RDW RBC AUTO: 41.7 FL (ref 35.1–46.3)
EGFRCR SERPLBLD CKD-EPI 2021: 91 ML/MIN/1.73M2 (ref 60–?)
EOSINOPHIL # BLD AUTO: 0.1 X10(3) UL (ref 0–0.7)
EOSINOPHIL NFR BLD AUTO: 1.1 %
ERYTHROCYTE [DISTWIDTH] IN BLOOD BY AUTOMATED COUNT: 12.6 % (ref 11–15)
FASTING STATUS PATIENT QL REPORTED: YES
GLOBULIN PLAS-MCNC: 3.3 G/DL (ref 2–3.5)
GLUCOSE BLD-MCNC: 90 MG/DL (ref 70–99)
HCT VFR BLD AUTO: 39.1 %
HGB BLD-MCNC: 12.7 G/DL
IMM GRANULOCYTES # BLD AUTO: 0.03 X10(3) UL (ref 0–1)
IMM GRANULOCYTES NFR BLD: 0.3 %
LYMPHOCYTES # BLD AUTO: 1.59 X10(3) UL (ref 1–4)
LYMPHOCYTES NFR BLD AUTO: 17.5 %
MCH RBC QN AUTO: 29.8 PG (ref 26–34)
MCHC RBC AUTO-ENTMCNC: 32.5 G/DL (ref 31–37)
MCV RBC AUTO: 91.8 FL
MONOCYTES # BLD AUTO: 0.71 X10(3) UL (ref 0.1–1)
MONOCYTES NFR BLD AUTO: 7.8 %
NEUTROPHILS # BLD AUTO: 6.59 X10 (3) UL (ref 1.5–7.7)
NEUTROPHILS # BLD AUTO: 6.59 X10(3) UL (ref 1.5–7.7)
NEUTROPHILS NFR BLD AUTO: 72.7 %
OSMOLALITY SERPL CALC.SUM OF ELEC: 287 MOSM/KG (ref 275–295)
P AXIS: 28 DEGREES
P-R INTERVAL: 136 MS
PLATELET # BLD AUTO: 284 10(3)UL (ref 150–450)
POTASSIUM SERPL-SCNC: 4 MMOL/L (ref 3.5–5.1)
PROT SERPL-MCNC: 7.7 G/DL (ref 5.7–8.2)
Q-T INTERVAL: 360 MS
QRS DURATION: 72 MS
QTC CALCULATION (BEZET): 445 MS
R AXIS: 32 DEGREES
RBC # BLD AUTO: 4.26 X10(6)UL
SODIUM SERPL-SCNC: 139 MMOL/L (ref 136–145)
T AXIS: 22 DEGREES
VENTRICULAR RATE: 92 BPM
WBC # BLD AUTO: 9.1 X10(3) UL (ref 4–11)

## 2024-09-06 PROCEDURE — 36415 COLL VENOUS BLD VENIPUNCTURE: CPT

## 2024-09-06 PROCEDURE — 93010 ELECTROCARDIOGRAM REPORT: CPT | Performed by: STUDENT IN AN ORGANIZED HEALTH CARE EDUCATION/TRAINING PROGRAM

## 2024-09-06 PROCEDURE — 80053 COMPREHEN METABOLIC PANEL: CPT

## 2024-09-06 PROCEDURE — 93005 ELECTROCARDIOGRAM TRACING: CPT

## 2024-09-06 PROCEDURE — 77080 DXA BONE DENSITY AXIAL: CPT | Performed by: NURSE PRACTITIONER

## 2024-09-06 PROCEDURE — 85025 COMPLETE CBC W/AUTO DIFF WBC: CPT

## 2024-09-07 ENCOUNTER — TELEPHONE (OUTPATIENT)
Dept: FAMILY MEDICINE CLINIC | Facility: CLINIC | Age: 34
End: 2024-09-07

## 2024-09-07 NOTE — TELEPHONE ENCOUNTER
----- Message from Devorah Romano sent at 9/7/2024  8:39 AM CDT -----  Results reviewed.   Normal bone density

## 2024-09-09 LAB — HPV E6+E7 MRNA CVX QL NAA+PROBE: NEGATIVE

## 2024-09-09 NOTE — TELEPHONE ENCOUNTER
----- Message from Devorah Romano sent at 9/9/2024  1:28 PM CDT -----  Results reviewed.   HPV negative  Pap is pending

## 2024-09-10 LAB
C TRACH DNA SPEC QL NAA+PROBE: NEGATIVE
N GONORRHOEA DNA SPEC QL NAA+PROBE: NEGATIVE

## 2024-09-11 NOTE — TELEPHONE ENCOUNTER
----- Message from Devorah Romano sent at 9/11/2024  7:10 AM CDT -----  Results reviewed.   STD screening negative

## 2024-09-12 ENCOUNTER — TELEPHONE (OUTPATIENT)
Dept: FAMILY MEDICINE CLINIC | Facility: CLINIC | Age: 34
End: 2024-09-12

## 2024-09-12 LAB
.: NORMAL
.: NORMAL

## 2024-09-12 NOTE — TELEPHONE ENCOUNTER
----- Message from Devorah Romano sent at 9/12/2024  3:15 PM CDT -----  Results reviewed.   Normal pap, repeat pap needed in 3 years.   No longer needs annual pap smears

## 2024-09-19 ENCOUNTER — HOSPITAL ENCOUNTER (OUTPATIENT)
Dept: ULTRASOUND IMAGING | Age: 34
Discharge: HOME | End: 2024-09-19
Attending: NURSE PRACTITIONER
Payer: MEDICAID

## 2024-09-19 DIAGNOSIS — N94.10 PAIN IN FEMALE GENITALIA ON INTERCOURSE: ICD-10-CM

## 2024-09-19 PROCEDURE — 76856 US EXAM PELVIC COMPLETE: CPT | Performed by: NURSE PRACTITIONER

## 2024-09-19 PROCEDURE — 76830 TRANSVAGINAL US NON-OB: CPT | Performed by: NURSE PRACTITIONER

## 2024-09-20 ENCOUNTER — TELEPHONE (OUTPATIENT)
Dept: FAMILY MEDICINE CLINIC | Facility: CLINIC | Age: 34
End: 2024-09-20

## 2024-09-20 NOTE — TELEPHONE ENCOUNTER
----- Message from Devorah Romano sent at 9/20/2024  9:06 AM CDT -----  Results reviewed.   Normal pelvic us

## 2024-09-25 ENCOUNTER — PATIENT MESSAGE (OUTPATIENT)
Dept: NEUROLOGY | Facility: CLINIC | Age: 34
End: 2024-09-25

## 2024-09-26 NOTE — TELEPHONE ENCOUNTER
From: Negar Gaona  To: Nicky Ochoa  Sent: 9/25/2024 5:37 PM CDT  Subject: Hello    I have a question about the pinched nerve in my back. It has been making it difficult to stand for long periods at a time and I have plans to [facility] my kids [facility] [employer] [facility] [employer] next month in [place]. They want me to fill out a IAC to have accommodations for rides and stuff. So I wanted to know if it was possible to get documentation on the pinched nerve as proof?

## 2024-10-06 ENCOUNTER — PATIENT MESSAGE (OUTPATIENT)
Dept: FAMILY MEDICINE CLINIC | Facility: CLINIC | Age: 34
End: 2024-10-06

## 2024-10-06 DIAGNOSIS — Z11.3 SCREEN FOR STD (SEXUALLY TRANSMITTED DISEASE): Primary | ICD-10-CM

## 2024-10-07 NOTE — TELEPHONE ENCOUNTER
From: Negar Gaona  To: Devorah Romano  Sent: 10/6/2024 12:47 AM CDT  Subject: Hi    I wanted to know if the tests I took recently check for herpes? I had. Partner before who had it and never got checked for it

## 2024-10-24 ENCOUNTER — OFFICE VISIT (OUTPATIENT)
Dept: OBGYN CLINIC | Facility: CLINIC | Age: 34
End: 2024-10-24
Payer: MEDICAID

## 2024-10-24 VITALS
DIASTOLIC BLOOD PRESSURE: 76 MMHG | WEIGHT: 247.13 LBS | SYSTOLIC BLOOD PRESSURE: 126 MMHG | HEIGHT: 64 IN | BODY MASS INDEX: 42.19 KG/M2

## 2024-10-24 DIAGNOSIS — N39.3 STRESS INCONTINENCE: ICD-10-CM

## 2024-10-24 DIAGNOSIS — N94.819 VULVODYNIA: Primary | ICD-10-CM

## 2024-10-24 PROCEDURE — 81514 NFCT DS BV&VAGINITIS DNA ALG: CPT | Performed by: NURSE PRACTITIONER

## 2024-10-24 PROCEDURE — 99214 OFFICE O/P EST MOD 30 MIN: CPT | Performed by: NURSE PRACTITIONER

## 2024-10-24 NOTE — PROGRESS NOTES
Here for new gynecology visit.  34 year old G 1 P 1.  No LMP recorded. Patient has had an injection..     Here for a new onset of right sided pain with intercourse. It has been ongoing for around 2 months. It is every encounter and it radiated into her groin and then lower right to mid abdomen. She also has a similar pain when she has an orgasm.    She also notes some incontinence with coughing hard.    Her partner feels that her pelvic floor is too relaxed with intercourse. This has been since they started dating x 8 years.    She had a normal pelvic ultrasound 2024 and 2024.    Menses are currently absent as she has been using Depo Provera 16 years. Her last injection was 2024. Her bone density scant done 2024 was normal.    Last pap smear in 2024 and it was normal.  She has had a hx of abnormal pap smears.     OB Hx:  1 .    Family gyn hx:  neg.   Family breast hx:  maternal aunt, paternal aunt and maternal cousin.    Past Medical History:    Disorder of thyroid    THYROMEGALLY    Low back pain    MS (multiple sclerosis) (HCC)    Seizure (HCC)    Seizure disorder (HCC)    as a child    Visual impairment    GLASSES     Past Surgical History:   Procedure Laterality Date    Appendectomy  2018    Appendectomy      Removal gallbladder          Tonsillectomy       Medications Ordered Prior to Encounter[1]    OB History    Para Term  AB Living   1 1       1   SAB IAB Ectopic Multiple Live Births           1      # Outcome Date GA Lbr Joce/2nd Weight Sex Type Anes PTL Lv   1 Para /15/16    M NORMAL SPONT EPI  SANDER     ROS:    General:  No wt loss, wt gain, appetite changes.    /76 (BP Location: Right arm, Patient Position: Sitting)   Ht 64\"   Wt 247 lb 2 oz (112.1 kg)   BMI 42.42 kg/m²     VULVA:  No lesions or erythema.  VAGINA:  No lesions, cystocele/ anterior wall prolapse noted. Small cream discharge noted..  CERVIX:  No lesions or CMT.   UTERUS: Normal shape and  size.  ADNEXA:  No pain or masses.    IMP/PLAN:    1. Vulvodynia  - Vaginitis Vaginosis PCR Panel; Future  - Vaginitis Vaginosis PCR Panel  - Pelvic Floor Therapy - [facility]    2. Stress incontinence  - Pelvic Floor Therapy - [facility]    If no improvement she could be evaluated at the [facility]var [facility]    See as needed.         [1]   Current Outpatient Medications on File Prior to Visit   Medication Sig Dispense Refill    VYVANSE 30 MG Oral Cap       cephALEXin 500 MG Oral Cap Take 1 capsule (500 mg total) by mouth 3 (three) times daily. 30 capsule 1    bacitracin 500 UNIT/GM External Ointment Apply to wound twice a day for ten days 14.2 g 0    TECFIDERA 240 MG Oral Capsule Delayed Release TAKE 1 CAPSULE IN THE MORNING AND 1 CAPSULE BEFORE BEDTIME 60 capsule 9    gabapentin 100 MG Oral Cap Week 1- take 1 capsule three times a day, Week 2- if nerve pain still present, take 2 caps three times a day, Week 3 and on- if tolerating and if nerve pain still present, take 3 caps three times a day (Patient not taking: Reported on 10/24/2024) 270 capsule 0    Cholecalciferol (VITAMIN D3) 50 MCG (2000 UT) Oral Chew Tab       Cyanocobalamin (B-12) 1000 MCG Oral Tab        No current facility-administered medications on file prior to visit.

## 2024-10-25 LAB
BV BACTERIA DNA VAG QL NAA+PROBE: NEGATIVE
C GLABRATA DNA VAG QL NAA+PROBE: NEGATIVE
C KRUSEI DNA VAG QL NAA+PROBE: NEGATIVE
CANDIDA DNA VAG QL NAA+PROBE: NEGATIVE
T VAGINALIS DNA VAG QL NAA+PROBE: NEGATIVE

## 2024-11-19 ENCOUNTER — PATIENT MESSAGE (OUTPATIENT)
Dept: OBGYN CLINIC | Facility: CLINIC | Age: 34
End: 2024-11-19

## 2024-12-05 DIAGNOSIS — R63.2 BINGE EATING: ICD-10-CM

## 2024-12-05 DIAGNOSIS — E66.813 CLASS 3 SEVERE OBESITY DUE TO EXCESS CALORIES WITHOUT SERIOUS COMORBIDITY WITH BODY MASS INDEX (BMI) OF 40.0 TO 44.9 IN ADULT (HCC): ICD-10-CM

## 2024-12-05 DIAGNOSIS — E66.01 CLASS 3 SEVERE OBESITY DUE TO EXCESS CALORIES WITHOUT SERIOUS COMORBIDITY WITH BODY MASS INDEX (BMI) OF 40.0 TO 44.9 IN ADULT (HCC): ICD-10-CM

## 2024-12-05 DIAGNOSIS — Z51.81 ENCOUNTER FOR THERAPEUTIC DRUG LEVEL MONITORING: Primary | ICD-10-CM

## 2024-12-05 RX ORDER — LISDEXAMFETAMINE DIMESYLATE 30 MG/1
CAPSULE ORAL
Refills: 0 | Status: CANCELLED | OUTPATIENT
Start: 2024-12-05

## 2024-12-06 NOTE — TELEPHONE ENCOUNTER
Vyvanse normally addressed by Ami Mukherjee  Last office visit 8/9/24  Last refilled on 10/29/24 for # 30 with 0 refills  Future Appointments   Date Time Provider Department Center   1/24/2025  2:40 PM Ami Mukherjee PA-C [facility] [address]   2/19/2025 11:35 AM Nicky Ochoa DO [facility] [place]        Thank you.

## 2024-12-07 ENCOUNTER — PATIENT MESSAGE (OUTPATIENT)
Facility: CLINIC | Age: 34
End: 2024-12-07

## 2024-12-07 DIAGNOSIS — R63.2 BINGE EATING: Primary | ICD-10-CM

## 2024-12-08 RX ORDER — LISDEXAMFETAMINE DIMESYLATE 30 MG/1
30 CAPSULE ORAL DAILY
Qty: 30 CAPSULE | Refills: 0 | Status: SHIPPED | OUTPATIENT
Start: 2025-02-08 | End: 2025-02-19

## 2024-12-08 RX ORDER — LISDEXAMFETAMINE DIMESYLATE 30 MG/1
30 CAPSULE ORAL DAILY
Qty: 30 CAPSULE | Refills: 0 | Status: SHIPPED | OUTPATIENT
Start: 2025-01-08 | End: 2025-01-27

## 2024-12-08 RX ORDER — LISDEXAMFETAMINE DIMESYLATE 30 MG/1
30 CAPSULE ORAL DAILY
Qty: 30 CAPSULE | Refills: 0 | Status: SHIPPED | OUTPATIENT
Start: 2024-12-08 | End: 2025-01-27

## 2024-12-08 NOTE — TELEPHONE ENCOUNTER
Requesting vyvanse 30  LOV: 8/9/24  RTC: 3 months  Last Relevant Labs: na  Filled: 10/29/24 #30 with 0 refills  last filled 10/8/24 #30 for 30 days on snapshot    Future Appointments   Date Time Provider Department Center   1/24/2025  2:40 PM Ami Mukherjee PA-C [facility] [address]   2/19/2025 11:35 AM Nicky Ochoa DO [facility] [place]

## 2024-12-18 RX ORDER — LISDEXAMFETAMINE DIMESYLATE 30 MG/1
30 CAPSULE ORAL DAILY
Qty: 30 CAPSULE | Refills: 0 | Status: SHIPPED | OUTPATIENT
Start: 2025-02-08 | End: 2025-03-10

## 2024-12-18 RX ORDER — LISDEXAMFETAMINE DIMESYLATE 30 MG/1
30 CAPSULE ORAL DAILY
Qty: 30 CAPSULE | Refills: 0 | Status: SHIPPED | OUTPATIENT
Start: 2025-01-08 | End: 2025-02-07

## 2024-12-18 RX ORDER — LISDEXAMFETAMINE DIMESYLATE 30 MG/1
30 CAPSULE ORAL DAILY
Qty: 30 CAPSULE | Refills: 0 | Status: SHIPPED | OUTPATIENT
Start: 2024-12-18 | End: 2025-01-17

## 2024-12-31 ENCOUNTER — PATIENT MESSAGE (OUTPATIENT)
Dept: OBGYN CLINIC | Facility: CLINIC | Age: 34
End: 2024-12-31

## 2025-01-02 NOTE — TELEPHONE ENCOUNTER
Phoned patient who states bleeding and oozing of puss has subsided. Denies fever, and redness at this time. 6/10 pain with warm to touch. Patient encouraged to be seen at Walk In Clinic or Urgent Care today for possible need of antibiotics and follow up should it persist through the weekend so we can schedule office visit.

## 2025-01-07 ENCOUNTER — HOSPITAL ENCOUNTER (OUTPATIENT)
Age: 35
Discharge: HOME | End: 2025-01-07
Payer: MEDICAID

## 2025-01-07 VITALS
DIASTOLIC BLOOD PRESSURE: 74 MMHG | HEART RATE: 73 BPM | SYSTOLIC BLOOD PRESSURE: 117 MMHG | TEMPERATURE: 99 F | RESPIRATION RATE: 16 BRPM | OXYGEN SATURATION: 100 %

## 2025-01-07 DIAGNOSIS — L73.9 FOLLICULITIS: Primary | ICD-10-CM

## 2025-01-07 DIAGNOSIS — N94.9 GENITAL LESION, FEMALE: ICD-10-CM

## 2025-01-07 PROCEDURE — 99213 OFFICE O/P EST LOW 20 MIN: CPT | Performed by: NURSE PRACTITIONER

## 2025-01-07 NOTE — ED PROVIDER NOTES
Patient Seen in: [facility]      History     Chief Complaint   Patient presents with    Eval-G     Stated Complaint: bump vaginal area    Subjective:   HPI      35-year-old female presents today with complaints of a draining bump by her vaginal area.  Patient states she has had this lesion for a couple weeks and it grew and then popped and now there is a hole in the area.  Patient states she called her OB/GYN and was advised that there was no openings and to come into the clinic for evaluation.  Patient states she has had this lesion before and the same thing happened where her OB/GYN had to open it with an I&D and drain the lesion.    Objective:     Past Medical History:    Disorder of thyroid    THYROMEGALLY    Low back pain    MS (multiple sclerosis) (HCC)    Seizure (HCC)    Seizure disorder (HCC)    as a child    Visual impairment    GLASSES              Past Surgical History:   Procedure Laterality Date    Appendectomy  02/14/2018    Appendectomy      Removal gallbladder      2021    Tonsillectomy                  No pertinent social history.            Review of Systems   Constitutional: Negative.    HENT: Negative.     Eyes: Negative.    Respiratory: Negative.     Cardiovascular: Negative.    Gastrointestinal: Negative.    Endocrine: Negative.    Genitourinary: Negative.    Musculoskeletal: Negative.    Skin:  Positive for wound.   Allergic/Immunologic: Negative.    Neurological: Negative.    Hematological: Negative.    Psychiatric/Behavioral: Negative.         Positive for stated complaint: bump vaginal area  Other systems are as noted in HPI.  Constitutional and vital signs reviewed.      All other systems reviewed and negative except as noted above.    Physical Exam     ED Triage Vitals   BP 01/07/25 1640 117/74   Pulse 01/07/25 1640 73   Resp 01/07/25 1640 16   Temp 01/07/25 1643 98.6 °F (37 °C)   Temp src 01/07/25 1640 Oral   SpO2 01/07/25 1640 100 %   O2 Device 01/07/25 1640 None (Room  air)       Current Vitals:   Vital Signs  BP: 117/74  Pulse: 73  Resp: 16  Temp: 98.6 °F (37 °C)  Temp src: Oral    Oxygen Therapy  SpO2: 100 %  O2 Device: None (Room air)        Physical Exam  Vitals and nursing note reviewed.   Constitutional:       Appearance: Normal appearance. She is normal weight.   HENT:      Head: Normocephalic and atraumatic.      Right Ear: External ear normal.      Left Ear: External ear normal.   Eyes:      Extraocular Movements: Extraocular movements intact.      Conjunctiva/sclera: Conjunctivae normal.      Pupils: Pupils are equal, round, and reactive to light.   Pulmonary:      Effort: Pulmonary effort is normal.   Genitourinary:     Vagina: No vaginal discharge.      Comments: Folliculitis lesion appreciated to the john pubis.  Neurological:      Mental Status: She is alert.   Psychiatric:         Mood and Affect: Mood normal.           ED Course     Labs Reviewed   HSV 1/2 SUBTYPE BY PCR (LESION-ONLY)                   MDM      35-year-old female presents today with complaints of a draining bump by her vaginal area.  Patient states she has had this lesion for a couple weeks and it grew and then popped and now there is a hole in the area.  Patient states she called her OB/GYN and was advised that there was no openings and to come into the clinic for evaluation.  Patient states she has had this lesion before and the same thing happened where her OB/GYN had to open it with an I&D and drain the lesion.  Vital signs: Please see EMR.  Physical exam: Please see exam.  Differential diagnosis: Bartholin cyst, abscess, folliculitis.  Based on physical exam and HPI will diagnosed with folliculitis.  We will notify patient of any abnormalities with the vaginal lesion culture that indicates the need to change treatment plan.  ED precautions given.      Medical Decision Making  35-year-old female presents today with complaints of a draining bump by her vaginal area.  Patient states she has had  this lesion for a couple weeks and it grew and then popped and now there is a hole in the area.  Patient states she called her OB/GYN and was advised that there was no openings and to come into the clinic for evaluation.  Patient states she has had this lesion before and the same thing happened where her OB/GYN had to open it with an I&D and drain the lesion.    Problems Addressed:  Folliculitis: acute illness or injury  Genital lesion, female: acute illness or injury    Amount and/or Complexity of Data Reviewed  Labs: ordered. Decision-making details documented in ED Course.        Disposition and Plan     Clinical Impression:  1. Folliculitis    2. Genital lesion, female         Disposition:  Discharge  1/7/2025  5:03 pm    Follow-up:  Alize Moeller MD  [address] [address] [phone]    In 1 week            Medications Prescribed:  Current Discharge Medication List              Supplementary Documentation:

## 2025-01-07 NOTE — DISCHARGE INSTRUCTIONS
You will be notified of any abnormalities with the genital lesion that indicates need to change treatment plan.

## 2025-01-10 LAB
HSV 1 NAA: NEGATIVE
HSV 1 NAA: NEGATIVE
HSV 2 NAA: NEGATIVE
HSV 2 NAA: NEGATIVE

## 2025-02-06 ENCOUNTER — OFFICE VISIT (OUTPATIENT)
Dept: PHYSICAL THERAPY | Age: 35
End: 2025-02-06
Attending: NURSE PRACTITIONER
Payer: MEDICAID

## 2025-02-06 DIAGNOSIS — N94.819 VULVODYNIA: Primary | ICD-10-CM

## 2025-02-06 DIAGNOSIS — N39.3 STRESS INCONTINENCE: ICD-10-CM

## 2025-02-06 PROCEDURE — 97112 NEUROMUSCULAR REEDUCATION: CPT

## 2025-02-06 PROCEDURE — 97162 PT EVAL MOD COMPLEX 30 MIN: CPT

## 2025-02-06 NOTE — PROGRESS NOTES
MUSCULOSKELETAL AND PELVIC FLOOR EVALUATION:     Diagnosis:   Vulvodynia (N94.819)  Stress incontinence (N39.3) Patient:  Negar Gaona (35 year old, female)        Referring Provider: Ly Sarabia  Today's Date   2025    Precautions:  Other (use comment) (Hx SI)   Date of Evaluation: 25  Next MD visit: No data recorded  Date of Surgery: No data recorded     PATIENT SUMMARY   Summary of chief complaints:    History of current condition: Intermittent LBP - did have PT for LBP and has somewhat improved. Intermittent CORAZON, short urge, rare UUI, R sided pain with intercourse/orgasm   Pain level: current 0 /10, at best 0 /10, at worst 0 /10  Description of symptoms: See above   Occupation: [employer] , off Thursday/Friday   Leisure activities/Hobbies: Sewing/[employer], playing with kids   Prior level of function: IND  Current limitations: [place], CORAZON/UUI, community and activity participation  Pt goals: Increased strength, stop leaking  Pregnant Now: No   OB History    Para Term  AB Living   1 1 0 0 0 1   SAB IAB Ectopic Multiple Live Births   0 0 0 0 1      # Outcome Date GA Lbr Joce/2nd Weight Sex Type Anes PTL Lv   1 Para /15/    M NORMAL SPONT EPI  SANDER     Urodynamic Test: N/A   Manometry: N/A   PFDI 20    Scores   POPDI 6:    33.33   CRAD 8:    34.38   MICHELLE 6:    41.67   Summary:    109.38      Outpatient Therapy Pelvic Health Intake       Question 2025 10:46 PM CST - Filed by Patient    Do you usually experience pressure in the lower abdomen? Somewhat    Do you usually experience heaviness or dullness in the pelvic area? Somewhat    Do you usually have a bulge or something falling out that you can see or feel in your vaginal area? Not at all    Do you ever have to push on the vagina or around the rectum to have or complete a bowel movement? Not at all    Do you usually experience a feeling of incomplete bladder emptying? Not at all    Do you ever have to push up  on a bulge in the vaginal area with your fingers to start or complete urination? Not at all    Please provide details on the following urinary history / complaints     Frequency of urination: # of times/day       Frequency of urination: # of times/night       When you have the urge to urinate, how long can you delay before you have to go to the toilet? (# of minutes or hours) Probably a few minutes    Do you have a history of urinary tract infections: No    Do you have a history of urine loss (select all that apply)       How many times a day does leakage occur?       If you have leakage, what type(s) of protective device(s) do you use? (Select all that apply)       On average, how many pads do you use each day? 0    Do you soak the pad fully?       Do you change the pad each time it is wet?       Do you experience any of these symptoms? (Select all that apply)       Do you usually experience frequent urination? Somewhat    Do you usually experience urine leakage associated with a feeling of urgency, that is, a strong sensation of needing to go to the bathroom? Somewhat    Do you usually experience urine leakage related to coughing, sneezing, or laughing? Somewhat    Do you usually experience small amounts of urine leakage (that is, drops)? Somewhat    Do you usually experience difficulty empyting your bladder? Not at all    Do you usually experience pain or discomfort in the lower abdomen or genital region? Not at all    Have you ever had any of the following treatment:     Have you ever done exercises to control urine loss? If so, for how long? No    Has your doctor ever prescribed any medication for urine loss? No    Have you had any surgical procedures to treat urine loss? No    Please provide details on the following bowel history / complaints.     How many bowel movements do you have per day?       How many bowel movements do you have per night?       Do you experience diarrhea? If yes, how often?       Do you  feel you need to strain too hard to have a bowel movement? Not at all    Do you feel you have not completely emptied your bowels at the end of a bowel movement? Not at all    Do you usually lose stool beyond your control if your stool is well formed? Not at all    Do you usually lose stool beyond your control if your stool is loose? Somewhat    Do you usually lose gas from the rectum beyond your control? Somewhat    Do you usually have pain when you pass your stool? Not at all    Do you experience strong sense of urgency and have to rush to bathroom for bowel movement? Somewhat    Does part of bowel ever pass through rectum and bulge outside during/after bowel movement? Not at all    Please provide details on your daily fluid/food intake.     On average, what is your daily fluid intake (1 glass=8ounces)? (# of glasses per day)       How many are caffeinated? (# of glasses per day)       Do you restrict fluids because of incontenence (leakage)?       Do you include fiber in your diet (fruits, vegetables, bran, etc.)?       Psychosocial information     Do you live alone?       Which recreational activities do you participate in if any?       Have you had to restrict your activities due to your pelvic health concerns?       On a scale of 0 (no impairments) to 10 (severe impairments), what are your feelings about your urinary or bowel incontinence or pelvic pain?       Do you have pain with intercourse? No    Have you had any changes in intimate relationships/sexual function due to your symptoms?  Yes    Your personal safety is of utmost importance to us at [facility], please answer the following questions:     Are you being hurt, frightened, demeaned, or taken advantage of by anyone at your home or in your life?  No    Have you recently had thoughts of hurting yourself? No    Have you tried to hurt yourself in the past?  Yes    Pelvic Organ Prolapse Distress Inventory 6 Score (range: 0 - 100) 33.33     Colorectal-Anal Distress Inventory 8 Score (range: 0 - 100) 34.38    Urinary Distress Inventory 6 Score (range: 0 - 100) 41.67    PFDI Summary Score (range: 0 - 300) 109.38            Past medical history was reviewed with Negar.  Significant findings include: Gall and appendix removal, hx SI/self-harm, multiple sclerosis, partial thyroidectomy  Imaging/Tests: N/A   Negar  has a past medical history of Disorder of thyroid, Low back pain, MS (multiple sclerosis) (HCC), Seizure (HCC), Seizure disorder (HCC), and Visual impairment.  She  has a past surgical history that includes appendectomy (02/14/2018); tonsillectomy; appendectomy; and removal gallbladder.    ASSESSMENT  Negar presents to physical therapy evaluation with primary c/o  . The results of the objective tests and measures show Dyssynergy, core weakness, LE weakness. Functional deficits include but are not limited to [place], CORAZON/UUI, community and activity participation. Signs and symptoms are consistent with diagnosis of Vulvodynia (N94.819)  Stress incontinence (N39.3). Pt and PT discussed evaluation findings, pathology, POC and HEP.  Pt voiced understanding and performs HEP correctly without reported pain. Skilled Physical Therapy is medically necessary to address the above impairments and reach functional goals.    OBJECTIVE:   Musculoskeletal  Posture: Posture: APT, genu recurvatum   Pelvic Alignment: APT   Abdominal Wall Integrity: Coning but no LYNDSEY   Diastasis Recti (finger width depth while contracted):    Above Umbilicus: 0    Umbilicus: 0    Below Umbilicus: 0     Informed consent for external, clothed pelvic evaluation given:Yes     Demos excellent PFM coordination after instruction     Trunk ROM     Flex 100     Ext 100    R L     Side bend 100 100     Rotation 100 100   ,   Hip   MMT (-/5)    R L     Flex (L2) 5 5     Ext  4 4     Abd 4 4     ER 4 4     IR 4- 4     ,   Knee   MMT (-/5)    R L     Flex 5 5     Ext (L3) 5 5     Balance  and Functional Mobility  Gait: pt ambulates on level ground with normal mechanics.  SLS EO: R 10 SEC    L 10 SEC   Squat: excellent ER of hip, neutral spine, mild anterior knee      Today's Treatment and Response:   Pt education was provided on exam findings, treatment diagnosis, treatment plan, expectations, and prognosis.  Today's Treatment       2/6/2025   PT Treatment   Neuro Re-Ed Today's Treatment and Response:   Patient education was provided on objective findings of external and internal evaluation and expectations with treatment outcomes. Educated on pelvic anatomy and function with diagrams and pelvic model, bladder normatives, adequate hydration levels, stress/urge urinary incontinence strategies, diaphragmatic breathing for PNS activation and pelvic floor relaxation , coordination of diaphragmatic breathing and pelvic floor contraction , and knack/pelvic muscle brace    Diaphragmatic breathing  Pelvic brace  Pelvic breath  Pelvic brace with neutral curl up x10 B and 90/90 holds x10   Therapeutic Activity --   Neuro Re-Ed Min 30   Total of Timed Procedures 30   Total of Service Based 15   Total Treatment Time 45          No data to display                 Patient was instructed in and issued a HEP for: Access Code: [email or web address]  URL: [email or web address]  Date: 02/06/2025  Prepared by: Joi Malik    Exercises  - Supine Diaphragmatic Breathing  - 1 x daily - 7 x weekly - 3 sets - 10 reps  - Neutral Curl Up with Straight Leg  - 1 x daily - 7 x weekly - 3 sets - 10 reps  - Supine 90/90 Abdominal Bracing  - 1 x daily - 7 x weekly - 3 sets - 10 reps    Charges:  PT EVAL: Moderate Complexity, 2 NMR  In agreement with evaluation findings and clinical rationale, this evaluation involved MODERATE COMPLEXITY decision making due to 1-2 personal factors/comorbidities, 3 or more body structures involved/activity limitations, and evolving symptoms as documented in the evaluation.                                                                        PLAN OF CARE:    Goals: (to be met in 10 visits)   Goals       Therapy Goals      Not Met Progress Toward Partially Met Met   Patient will report decreased urinary frequency to <10x/day and 0x/night indicating normalizing micturition reflex for improved bladder health and function. [] [] [] []   Patient will report ability to postpone urination for at least 30 mins after initial urge presents for improved ability to participate in community outings without fear of UI. [] [] [] []   Patient will report use of KNACK contraction at least 75% of the time to re-establish cough reflex and mitigate urinary leakage with increases in intra-abdominal pressure. [] [] [] []   Patient will report adherence to HEP for continued exercise benefits following cessation of PT. [] [] [] []                   Frequency / Duration: Patient will be seen 1x/week or a total of 10  visits over a 90 day period. Treatment will include: Manual Therapy; Neuromuscular Re-education; Therapeutic Activities; Therapeutic Exercise; Home Exercise Program instruction    Education or treatment limitation: None   Rehab Potential: excellent       Patient/Family/Caregiver was advised of these findings, precautions, and treatment options and has agreed to actively participate in planning and for this course of care.    Thank you for your referral. Please co-sign or sign and return this letter via fax as soon as possible to [phone]. If you have any questions, please contact me at Dept: [phone]    Sincerely,  Electronically signed by therapist: Joi Malik, PT, DPT, PCES  Physician's certification required: Yes  I certify the need for these services furnished under this plan of treatment and while under my care.    X___________________________________________________ Date____________________    Certification From: 2/6/2025  To:5/7/2025

## 2025-02-13 ENCOUNTER — APPOINTMENT (OUTPATIENT)
Dept: PHYSICAL THERAPY | Age: 35
End: 2025-02-13
Attending: NURSE PRACTITIONER
Payer: MEDICAID

## 2025-02-17 ENCOUNTER — PATIENT MESSAGE (OUTPATIENT)
Dept: NEUROLOGY | Facility: CLINIC | Age: 35
End: 2025-02-17

## 2025-02-19 ENCOUNTER — OFFICE VISIT (OUTPATIENT)
Dept: NEUROLOGY | Facility: CLINIC | Age: 35
End: 2025-02-19
Payer: MEDICAID

## 2025-02-19 VITALS
WEIGHT: 250 LBS | RESPIRATION RATE: 16 BRPM | BODY MASS INDEX: 43 KG/M2 | HEART RATE: 78 BPM | DIASTOLIC BLOOD PRESSURE: 84 MMHG | SYSTOLIC BLOOD PRESSURE: 126 MMHG

## 2025-02-19 DIAGNOSIS — G47.00 INSOMNIA, UNSPECIFIED TYPE: ICD-10-CM

## 2025-02-19 DIAGNOSIS — G56.01 RIGHT CARPAL TUNNEL SYNDROME: ICD-10-CM

## 2025-02-19 DIAGNOSIS — G43.109 MIGRAINE WITH AURA AND WITHOUT STATUS MIGRAINOSUS, NOT INTRACTABLE: Primary | ICD-10-CM

## 2025-02-19 DIAGNOSIS — G35 MS (MULTIPLE SCLEROSIS) (HCC): ICD-10-CM

## 2025-02-19 PROCEDURE — 99214 OFFICE O/P EST MOD 30 MIN: CPT | Performed by: OTHER

## 2025-02-19 RX ORDER — ESCITALOPRAM OXALATE 10 MG/1
10 TABLET ORAL DAILY
COMMUNITY
Start: 2025-02-01

## 2025-02-19 RX ORDER — TOPIRAMATE 25 MG/1
TABLET, FILM COATED ORAL
Qty: 90 TABLET | Refills: 1 | Status: SHIPPED | OUTPATIENT
Start: 2025-02-19

## 2025-02-19 RX ORDER — TOPIRAMATE 25 MG/1
TABLET, FILM COATED ORAL
Qty: 231 TABLET | Refills: 0 | OUTPATIENT
Start: 2025-02-19

## 2025-02-19 NOTE — TELEPHONE ENCOUNTER
Refused. Patient was recently started on this medication.       Medication: TOPIRAMATE 25 MG Oral Tab      Date of last refill: 02/19/2025 (#90/1)  [place]     Last office visit: 02/19/2025  Due back to clinic per last office note:  Around 08/19/2025  Date next office visit scheduled:    Future Appointments   Date Time Provider Department Center   2/20/2025  8:30 AM Lausch, Joi, PT [facility] [place]   3/6/2025  8:30 AM Lausch, Joi, PT [facility] [place]   3/13/2025  8:30 AM Lausch, Joi, PT [facility] [place]   3/20/2025  8:30 AM Lausch, Joi, PT [facility] [place]   3/27/2025  8:30 AM Lausch, Joi, PT [facility] [place]   4/3/2025  8:30 AM Lausch, Joi, PT [facility] [place]   4/10/2025  8:30 AM Lausch, Joi, PT [facility] [place]   4/17/2025  8:30 AM Lausch, Joi, PT [facility] [place]   8/20/2025  9:35 AM Nicky Ochoa DO [facility] [place]           Last OV note recommendation:    Discussion/Plan:  Clinical upper lumbar radiculopathy, as well as history of right S1 radiculopathy, latter found on EMG  Start gabapentin 100mg TID  Start physical therapy  If radicular pain does not improve, will recommend MRI L spine     Migraine headaches, occurring 2-3 days per week  At risk for medication overuse headache  Start topamax 25mg to 75mg nightly  Educated on triggers and lifestyle behaviors including limiting caffeine intake, not skipping meals, having regular sleep schedule and practicing good sleep hygiene, avoid migraine food triggers/try to identify if any of them are triggers (nitrates, aged cheeses, red wine, chocolate, msg, artificial sweetener). Also discussed medication overuse headache including taking abortive medications, or any combination of them, on more than 2-3 days of the week, and to take abortive medication immediately at onset of headache     RRMS-   MRI brain 10/2022 overall showed no active lesion, and there was  minimal progression compared to 2019  Low Vitamin D on 2000 daily, increase Vit D to 4000 units daily  Continue Tecfidera   Check CBC/CMP every 6 months     Insomnia- sleep  therapist        R very mild carpal tunnel and intermittent median nerve irritation  Start a new wrist brace- go to [facility] to get fitted  Avoid positions discussed.      Short term memory loss- B12 low normal, continue B12 500mcg daily oral, one consideration is concentration/attention deficit related, make sure having regular bedtime and wake up times

## 2025-02-19 NOTE — PROGRESS NOTES
[facility]   Neurology    Negar Gaona Patient Status:  No patient class for patient encounter    1990 MRN [id number]   Location [facility], [facility] PCP Alize Moeller MD              HPI:   Negar Gaona is a(n) 35 year old female who presents for evaluation of MS. She was seen over 3 years ago, and was started on Tecfidera in . She moved to [place], and now has moved back. She is a . She ran out of Tecfidera, and because of her phone changing, the company wasn't able to contact her to restart it. She came off Tecfidera 2022. In 2019 she had L arm heaviness. Prior to Tecfidera, she was seen in the ED and MRI of the brain showed two ovoid enhancing lesions.  She reports occasionally she had once a visual distortion. Since 2022, she gets tingling on the left face and arm, it will last 3-5 seconds, occurs 5-6 times per day. When she is stressed, it will last until she calms down. Also get LH with these brief paresthesias, as well as left eye blurriness. Feels like words slur as well. In 10/2022 she was seen in the ED, and was given a medrol dose pack. The episodes became less frequent after the medrol dose pack. Feels like memory is worse.   Patient here to follow up regarding MS. States that numbness in hands, right leg on/off. Top of foot on right and left toe since beginning of October. Vision blurred in 2023 episode, did have fall but on to couch. Numbness similar to when first diagnosed with MS. Started in 2023. Tingling sensation down arms and into fingertips, lasting 1-2 minutes. Now happening 1-2 times a week. Was also getting sharp pains radiating from her R hip down her leg, to the foot. This now is also less frequent, lasting around 5 minutes. Now has a constant pain in the dorsum of her right foot, worse when puts pressure on it. Intermittent has burning left toe pain when walking. Is still  a . Had an instance of getting up too fast, got LH and developed tunnel vision, sat back down and improved.   Interim  Patient here to follow up regarding MS and back pain. Legs will still have on/off numbness but no worse since last visit. Was having increased low back pain that was radiation down left leg. That has gotten better so did not start medrol dose pack yet. Was seen in ED as well for this. Intermittent hand paresthesias that radiate from the shoulder to the forearm or fingers are better. Mainly triggered by playing video games.   Interim  Prescribed Vyvanse for weight loss, but also helping her ADHD. Was started lexapro for depression. Gets tingling in the R hand at night, or when playing video games. Reports wrist brace hurt. Gets occipital left sided headaches, radiate to the orbit, at times with photo and phonophobia. Started about a year ago. Occur now 2-3 times a week. Actually had them before and topamax helped. Denies weakness, episodes of loss of vision. Gets 3-5 hours of sleep. This is a baseline for her. However, now sleep is more interrupted.       Tecfidera- started 10/2019, stopped in 5/2022, then restart again Spring 2023    Pertinent imaging and laboratory work-up:   MRI brain 7/2024 CONCLUSION:       1. Mild burden of demyelinating plaques is stable from 2022.  No enhancing lesions.       Component      Latest Ref Rng 1/27/2025   WBC      4.0 - 11.0 x10(3) uL 8.8    RBC      3.80 - 5.30 x10(6)uL 4.60    Hemoglobin      12.0 - 16.0 g/dL 13.7    Hematocrit      35.0 - 48.0 % 41.5    Platelet Count      150.0 - 450.0 10(3)uL 282.0    MCV      80.0 - 100.0 fL 90.2    MCH      26.0 - 34.0 pg 29.8    MCHC      31.0 - 37.0 g/dL 33.0    RDW      % 12.5    Prelim Neutrophil Abs      1.50 - 7.70 x10 (3) uL 7.03    Neutrophils Absolute      1.50 - 7.70 x10(3) uL 7.03    Lymphocytes Absolute      1.00 - 4.00 x10(3) uL 1.10        Component      Latest Ref Rng 1/27/2025   Immature  Granulocyte %      % 0.1    Glucose      70 - 99 mg/dL 115 (H)    Sodium      136 - 145 mmol/L 138    Potassium      3.5 - 5.1 mmol/L 3.9    Chloride      98 - 112 mmol/L 106    Carbon Dioxide, Total      21.0 - 32.0 mmol/L 27.0    ANION GAP      0 - 18 mmol/L 5    BUN      9 - 23 mg/dL 7 (L)    CREATININE      0.55 - 1.02 mg/dL 0.86    CALCIUM      8.7 - 10.6 mg/dL 9.0    CALCULATED OSMOLALITY      275 - 295 mOsm/kg 285    EGFR      >=60 mL/min/1.73m2 91    AST (SGOT)      <34 U/L 20    ALT (SGPT)      10 - 49 U/L 16       Legend:  (H) High  (L) Low      EMG 1/2024  Conclusion:  1.  There is evidence of a chronic right S1 lumbosacral radiculopathy.  2.  There is evidence of a very mild right median compressive mononeuropathy at the wrist.  3.  There is no evidence of a right cervical radiculopathy.  4.  There is no evidence of a polyneuropathy.    MRI cervical 11/2023  CONCLUSION:    1. Mild degenerative changes most pronounced at C5-C6   MRI thoracic 11/2023  CONCLUSION:    1. Unremarkable MR appearance of the thoracic cord.   2. There are mildly prominent disc protrusions at T6-T7 and T11-T12.  There is mild spinal canal stenosis at T11-T12.         XR lumbar spine 6/2023  CONCLUSION:    1. Mild degenerative change without significant change when compared to most recent lumbar spine radiographs dated 10/2/2019.   MRI cervical 3/2023  CONCLUSION:       1. No evidence of demyelinating lesion or abnormal enhancement within the cervical cord.       2. Mild degenerative changes of the cervical spine from C4-5 to C6-7 are stable.   MRI thoracic 3/2023  CONCLUSION:       1.  No evidence of demyelinating lesions within the thoracic cord.      2. Stable mild degenerative changes of the thoracic spine at T6-7 and T11-12.       Component      Latest Ref Rng 3/10/2023   Vitamin B12      200 - 1,100 pg/mL 339    TSH      mIU/L 1.34          MRI brain 10/2022  CONCLUSION:  Scattered areas of FLAIR signal hyperintensity are  present within the white matter with a few new areas when compared with the prior exam of 1/31/2020 such as within the left periventricular white matter, adjacent to the left lateral aspect   of the fourth ventricle, and within the right parietal subcortical white matter.  None of these areas demonstrates enhancement or restricted diffusion to suggest acute demyelination.  This is most compatible with the reported history of demyelinating   disease.     MRI brain/MRA ww/o contrast 7/30/19  CONCLUSION:    1. There are 2 ovoid lesions of high T2/FLAIR signal within the right hemisphere involving the periventricular white matter with associated mild restriction of diffusion as well as mild post-contrast enhancement.  The appearance of the lesions are most   consistent with regions of demyelination/MS.  Please correlate clinically.  2. Unremarkable MR angiography of the head and neck.     MRI cervical 8/2019  CONCLUSION:       1. No focal spinal cord signal abnormality identified.  No abnormal enhancement in the cervical spine.     2. Mild degenerative changes in the cervical spine as above without significant spinal canal or neural foraminal stenosis at any level.     MRI thoracic 8/2019  CONCLUSION:       1. No focal spinal cord signal abnormality identified.  No abnormal enhancement.     2. Mild degenerative changes in the thoracic spine as above without significant spinal canal or neural foraminal stenosis at any level.    Component      Latest Ref Rng & Units 8/12/2019   TOTAL VOLUME CSF      mL 8.0   CSF TUBE #       4   Color CSF      Colorless Colorless   Clarity CSF      Clear Clear   WBC CSF      0 - 5 /mm3 17 (H)   RBC CSF      /mm3 6   CSF BAND OLIGOCLONAL       Positive (A)   CSF OLIGOCLONAL BANDS NUMBER      0 - 1 Bands 7 (H)   CSF OLIG INTERPRETATION       See Note   HEMOGLOBIN A1c      <5.7 % 4.8   ESTIMATED AVERAGE GLUCOSE      68 - 126 mg/dL 91   Lyme Screen IgG and IgM      Negative Negative   ANNIE  SCREEN      Negative Negative   Total Protein CSF      15.0 - 45.0 mg/dL 29.5   Glucose CSF      40 - 70 mg/dL 49            Past Medical History:  Past Medical History:    Disorder of thyroid    THYROMEGALLY    Low back pain    MS (multiple sclerosis) (HCC)    Seizure (HCC)    Seizure disorder (HCC)    as a child    Visual impairment    GLASSES        Past Surgical History:  Past Surgical History:   Procedure Laterality Date    Appendectomy  02/14/2018    Appendectomy      Removal gallbladder      2021    Tonsillectomy         Family History:  family history includes Breast Cancer (age of onset: 30) in her maternal cousin female and paternal aunt; Breast Cancer (age of onset: 40) in her maternal aunt; Diabetes in her father and paternal grandfather; Hypertension in her father.      Social History:   reports that she has never smoked. She has never used smokeless tobacco. She reports current alcohol use of about 2.0 standard drinks of alcohol per week. She reports current drug use. Drug: Cannabis.    Allergies:  No Known Allergies    MEDICATIONS:    Current Outpatient Medications:     escitalopram 10 MG Oral Tab, Take 1 tablet (10 mg total) by mouth daily., Disp: , Rfl:     topiramate 25 MG Oral Tab, Week 1: 1 tab at night, Week 2: 2 tab nighlty, Week 3: 3 tabs nightly, Week 4: if tolerating, and if headaches persist, increase to 4 tabs nightly. Alternatively, if headaches improve at lower dose, or if have side effects with higher dose, can decrease to lower prior dose., Disp: 90 tablet, Rfl: 1    VYVANSE 30 MG Oral Cap, , Disp: , Rfl:     TECFIDERA 240 MG Oral Capsule Delayed Release, TAKE 1 CAPSULE IN THE MORNING AND 1 CAPSULE BEFORE BEDTIME, Disp: 60 capsule, Rfl: 9    Cholecalciferol (VITAMIN D3) 50 MCG (2000 UT) Oral Chew Tab, , Disp: , Rfl:     Cyanocobalamin (B-12) 1000 MCG Oral Tab, , Disp: , Rfl:     lisdexamfetamine (VYVANSE) 30 MG Oral Cap, Take 1 capsule (30 mg total) by mouth daily., Disp: 30 capsule,  Rfl: 0      Review of Systems:   A comprehensive 10 point review of systems was completed.     Pertinent positives and negatives noted in the HPI.         PHYSICAL EXAM:   Neurologic Exam  Vitals  Vitals:    02/19/25 1129   BP: 126/84   Pulse: 78   Resp: 16       General Appearance: Patient is a 35 year old female in no acute distress  Cardiac: Normal rate & regular rhythm  Skin: There are no rashes or other skin lesions.  Musculoskeletal: There is no scoliosis, or joint deformities  Neurologic examination:  Mental status: Patient is alert, attentive, and oriented x 3. Language is coherent and fluent without aphasia. Memory, comprehension and ability to follow commands were intact.   Cranial nerves II-XII: Optic discs were sharp. Pupils were round and reacted to light. Extraocular movements were full. There was no face, jaw, palate or tongue weakness or atrophy. Facial sensation was normal. Hearing was grossly intact. Shoulder shrug was normal.   Motor exam revealed normal muscle bulk and tone. No atrophy or fasciculations. Manual muscle testing revealed MRC grade 5/5 strength throughout including proximal and distal muscles of the arms and legs.  Deep tendon reflexes were 2 at the biceps, brachioradialis, triceps, knee jerk, and ankle jerk. Plantar responses were flexor bilaterally. 1 beat of clonus bilat  Sensory exam revealed normal light touch perception. Vibratory perception 17 sec bilat and proprioception were intact at the toes. Pinprick and temperature were normal in LE's, hyperesthetic on the right dorsum. Romberg sign was absent.  Complex motor skills revealed normal coordination. Finger-nose-finger intact.   Gait was narrow and stable, was able to walk on heels, toes and tandem without any difficulty.     ASSESSMENT/ACTIVE PROBLEM LIST:     Encounter Diagnoses   Name Primary?    Migraine with aura and without status migrainosus, not intractable Yes    MS (multiple sclerosis) (HCC)     Right carpal tunnel  syndrome     Insomnia, unspecified type        Discussion/Plan:  Clinical upper lumbar radiculopathy, as well as history of right S1 radiculopathy, latter found on EMG  Start gabapentin 100mg TID  Start physical therapy  If radicular pain does not improve, will recommend MRI L spine    Migraine headaches, occurring 2-3 days per week  At risk for medication overuse headache  Start topamax 25mg to 75mg nightly  Educated on triggers and lifestyle behaviors including limiting caffeine intake, not skipping meals, having regular sleep schedule and practicing good sleep hygiene, avoid migraine food triggers/try to identify if any of them are triggers (nitrates, aged cheeses, red wine, chocolate, msg, artificial sweetener). Also discussed medication overuse headache including taking abortive medications, or any combination of them, on more than 2-3 days of the week, and to take abortive medication immediately at onset of headache      RRMS-   MRI brain 10/2022 overall showed no active lesion, and there was minimal progression compared to 2019  Low Vitamin D on 2000 daily, increase Vit D to 4000 units daily  Continue Tecfidera 240mg BID  Check CBC/CMP every 6 months    Insomnia- baseline sleeps 3-5 hours, but lately is more broken  Follow up with psychiatrist and therapist    R very mild carpal tunnel and intermittent median nerve irritation  Start a new wrist brace- go to [facility] to get fitted  Avoid positions discussed.     Short term memory loss- B12 low normal, continue B12 500mcg daily oral, one consideration is concentration/attention deficit related, make sure having regular bedtime and wake up times    Requested Prescriptions     Signed Prescriptions Disp Refills    topiramate 25 MG Oral Tab 90 tablet 1     Sig: Week 1: 1 tab at night, Week 2: 2 tab nighlty, Week 3: 3 tabs nightly, Week 4: if tolerating, and if headaches persist, increase to 4 tabs nightly. Alternatively, if headaches improve at lower dose,  or if have side effects with higher dose, can decrease to lower prior dose.          We discussed in depth regarding the diagnosis, prognosis, treatment. The patient was given ample opportunity to ask questions. All questions and concerns were addressed.     Shalonda Ochoa DO  Neuromuscular and General Neurology  [facility]

## 2025-02-19 NOTE — PATIENT INSTRUCTIONS
Refill policies:    Allow 2-3 business days for refills; controlled substances may take longer.  Contact your pharmacy at least 5 days prior to running out of medication and have them send an electronic request or submit request through the “request refill” option in your [email or web address] account.  Refills are not addressed on weekends; covering physicians do not authorize routine medications on weekends.  No narcotics or controlled substances are refilled after noon on Fridays or by on call physicians.  By law, narcotics must be electronically prescribed.  A 30 day supply with no refills is the maximum allowed.  If your prescription is due for a refill, you may be due for a follow up appointment.  To best provide you care, patients receiving routine medications need to be seen at least once a year.  Patients receiving narcotic/controlled substance medications need to be seen at least once every 3 months.  In the event that your preferred pharmacy does not have the requested medication in stock (e.g. Backordered), it is your responsibility to find another pharmacy that has the requested medication available.  We will gladly send a new prescription to that pharmacy at your request.    Scheduling Tests:    If your physician has ordered radiology tests such as MRI or CT scans, please contact Central Scheduling at [phone] right away to schedule the test.  Once scheduled, the [facility] Centralized Referral Team will work with your insurance carrier to obtain pre-certification or prior authorization.  Depending on your insurance carrier, approval may take 3-10 days.  It is highly recommended patients assure they have received an authorization before having a test performed.  If test is done without insurance authorization, patient may be responsible for the entire amount billed.      Precertification and Prior Authorizations:  If your physician has recommended that you have a procedure or additional testing performed the [facility]  Centralized Referral Team will contact your insurance carrier to obtain pre-certification or prior authorization.    You are strongly encouraged to contact your insurance carrier to verify that your procedure/test has been approved and is a COVERED benefit.  Although the [facility] Centralized Referral Team does its due diligence, the insurance carrier gives the disclaimer that \"Although the procedure is authorized, this does not guarantee payment.\"    Ultimately the patient is responsible for payment.   Thank you for your understanding in this matter.  Paperwork Completion:  If you require FMLA or disability paperwork for your recovery, please make sure to either drop it off or have it faxed to our office at [phone]. Be sure the form has your name and date of birth on it.  The form will be faxed to our Forms Department and they will complete it for you.  There is a 25$ fee for all forms that need to be filled out.  Please be aware there is a 10-14 day turnaround time.  You will need to sign a release of information (CHANNING) form if your paperwork does not come with one.  You may call the Forms Department with any questions at [phone].  Their fax number is [phone].       Get new right wrist brace: try   Address: [facility],[address] [place] [address]  Hours:   Open ? Closes 6?PM  Phone: [phone]

## 2025-02-19 NOTE — PROGRESS NOTES
Subjective:   Negar Gaona is a 35 year old female who presents for Multiple Sclerosis     ***  History/Other:    Chief Complaint Reviewed and Verified  No Further Nursing Notes to   Review  Tobacco Reviewed  Allergies Reviewed  Medical History Reviewed    Surgical History Reviewed  Social History Reviewed         Tobacco:  She has never smoked tobacco.    Current Outpatient Medications   Medication Sig Dispense Refill    lisdexamfetamine (VYVANSE) 30 MG Oral Cap Take 1 capsule (30 mg total) by mouth daily. 30 capsule 0    VYVANSE 30 MG Oral Cap       TECFIDERA 240 MG Oral Capsule Delayed Release TAKE 1 CAPSULE IN THE MORNING AND 1 CAPSULE BEFORE BEDTIME 60 capsule 9    Cholecalciferol (VITAMIN D3) 50 MCG (2000 UT) Oral Chew Tab       Cyanocobalamin (B-12) 1000 MCG Oral Tab            Review of Systems:  Review of Systems  ***    Objective:   LMP  (LMP Unknown)  Estimated body mass index is 42.05 kg/m² as calculated from the following:    Height as of 1/27/25: 64\".    Weight as of 1/27/25: 245 lb.  Physical Exam  ***    Assessment & Plan:   There are no diagnoses linked to this encounter.    {Tip  Follow Up:8307}  No follow-ups on file.    Nicole MASON RN, 2/19/2025, 11:31 AM

## 2025-02-20 ENCOUNTER — OFFICE VISIT (OUTPATIENT)
Dept: PHYSICAL THERAPY | Age: 35
End: 2025-02-20
Attending: NURSE PRACTITIONER
Payer: MEDICAID

## 2025-02-20 PROCEDURE — 97112 NEUROMUSCULAR REEDUCATION: CPT

## 2025-02-20 NOTE — PROGRESS NOTES
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  HEP adherent but lost paperwork.  Going off memory and google.  Unable to rate intercourse. No major leaks since evaluation.       Pain: 0/10     Objective          Posture: Posture: APT, genu recurvatum               Pelvic Alignment: APT      Abdominal Wall Integrity: Coning but no LYNDSEY           Informed consent for external, clothed pelvic evaluation given:Yes      Demos excellent PFM coordination after instruction; learning from evaluation         Hip    MMT (-/5)    R L     Flex (L2) 5 5     Ext  4 4     Abd 4 4     ER 4 4     IR 4- 4      Balance and Functional Mobility  Gait: pt ambulates on level ground with normal mechanics.  SLS EO: R 10 SEC           L 10 SEC          Squat: excellent ER of hip, neutral spine, mild anterior knee         Assessment  Excellent tolerance to progression this session.  Demos learning of pelvic brace and excellent coordination of pelvic floor with NMR.    Goals (to be met in 12 visits)   Goals       Therapy Goals      Not Met Progress Toward Partially Met Met   Patient will report decreased urinary frequency to <10x/day and 0x/night indicating normalizing micturition reflex for improved bladder health and function. [] [] [] []   Patient will report ability to postpone urination for at least 30 mins after initial urge presents for improved ability to participate in community outings without fear of UI. [] [] [] []   Patient will report use of KNACK contraction at least 75% of the time to  re-establish cough reflex and mitigate urinary leakage with increases in intra-abdominal pressure. [] [] [] []   Patient will report adherence to HEP for continued exercise benefits following cessation of PT. [] [] [] []                  Plan  Cont core stabilization    Treatment Last 4 Visits       2/6/2025 2/20/2025   PT Treatment   Treatment Day  2   Therapeutic Exercise  Alt clam, reverse clam x10 B   Neuro Re-Ed Today's Treatment and Response:   Patient education was provided on objective findings of external and internal evaluation and expectations with treatment outcomes. Educated on pelvic anatomy and function with diagrams and pelvic model, bladder normatives, adequate hydration levels, stress/urge urinary incontinence strategies, diaphragmatic breathing for PNS activation and pelvic floor relaxation , coordination of diaphragmatic breathing and pelvic floor contraction , and knack/pelvic muscle brace    Diaphragmatic breathing  Pelvic brace  Pelvic breath  Pelvic brace with neutral curl up x10 B and 90/90 holds x10 Diaphragmatic breathing  Pelvic brace  Pelvic breath  Pelvic brace with neutral curl up x10 B  90/90 holds x10  SL hip abd with pelvic brace x10 B  Bear plank with pelvic brace 2x5 B  Rohit stretch x10 inhale and elongate   Therapeutic Activity --    Therapeutic Exercise Min  5   Neuro Re-Ed Min 30 40   Total of Timed Procedures 30 45   Total of Service Based 15 0   Total Treatment Time 45 45         HEP  Access Code: [email or web address]  URL: [email or web address]  Date: 02/20/2025  Prepared by: Joi Malik    Exercises  - Supine Diaphragmatic Breathing  - 1 x daily - 7 x weekly - 3 sets - 10 reps  - Neutral Curl Up with Straight Leg  - 1 x daily - 4 x weekly - 3 sets - 10 reps  - Supine 90/90 Abdominal Bracing  - 1 x daily - 4 x weekly - 3 sets - 10 reps  - Sidelying Pelvic Floor Contraction with Hip Abduction  - 1 x daily - 4 x weekly - 3 sets - 10 reps  - Clamshell  - 1 x daily -  4 x weekly - 2 sets - 20 reps  - Sidelying Reverse Clamshell  - 1 x daily - 4 x weekly - 2 sets - 20 reps  - Bear Plank from Quadruped  - 1 x daily - 4 x weekly - 3 sets - 10 reps    Charges     3 NMR

## 2025-02-25 ENCOUNTER — PATIENT MESSAGE (OUTPATIENT)
Dept: FAMILY MEDICINE CLINIC | Facility: CLINIC | Age: 35
End: 2025-02-25

## 2025-03-04 ENCOUNTER — PATIENT MESSAGE (OUTPATIENT)
Dept: FAMILY MEDICINE CLINIC | Facility: CLINIC | Age: 35
End: 2025-03-04

## 2025-03-05 ENCOUNTER — PATIENT MESSAGE (OUTPATIENT)
Dept: OBGYN CLINIC | Facility: CLINIC | Age: 35
End: 2025-03-05

## 2025-03-05 NOTE — TELEPHONE ENCOUNTER
-Last injection ordered 6/2024    -From my last note:    Recommend seeing gyne for pain and to discuss contraception  Typically would not recommend Depo use longer than 5 years    -she needs to schedule appointment with gyne to discuss contraception. Saw Janki Sarabia in past

## 2025-03-05 NOTE — TELEPHONE ENCOUNTER
Yes I did see the OBGYN and she took bone test for me cause she also recommended not using for that long but after the tests came through my bones were really good and she said that it was ok for me to continue taking it. I chose to take the break I took right now. I decided though that I want to get back on it.

## 2025-03-06 ENCOUNTER — OFFICE VISIT (OUTPATIENT)
Dept: PHYSICAL THERAPY | Age: 35
End: 2025-03-06
Attending: NURSE PRACTITIONER
Payer: MEDICAID

## 2025-03-06 ENCOUNTER — APPOINTMENT (OUTPATIENT)
Dept: PHYSICAL THERAPY | Age: 35
End: 2025-03-06
Attending: NURSE PRACTITIONER
Payer: MEDICAID

## 2025-03-06 PROCEDURE — 97112 NEUROMUSCULAR REEDUCATION: CPT

## 2025-03-06 NOTE — TELEPHONE ENCOUNTER
My only concern would be the effects on her moods. What does her psychiatrist think of Depo? Could she check in with them to get their thought to see if they think it is  good plan? There may be better alternatives.   Rx Auth from [facility] for Cetirizine.  Patient was last seen 8/15/17 and was instructed to follow-up in 1 year.  Refills sent per protocol.    The patient is a 46y Female complaining of swelling of lower extremities.

## 2025-03-06 NOTE — TELEPHONE ENCOUNTER
Patient would like to resume Depo but PCP requesting patient follow up with gyne as it is not recommended for Depo to be used longer than 5yrs. Per patient you reviewed and stated bones were good to continue.     LOV: 10/24/24  Last Depo: 6/17/24  Dexa: 9/6/24   Please advise.

## 2025-03-06 NOTE — PROGRESS NOTES
Patient: Negar Gaona (35 year old, female) Referring Provider:  Insurance:   Diagnosis: Vulvodynia (N94.819)  Stress incontinence (N39.3) Ly Sarabia  [facility] MEDICAID   Date of Surgery: No data recorded Next MD visit:  N/A   Precautions:  Other (use comment) (Hx SI) No data recorded Referral Information:    Date of Evaluation: Req: 12, Auth: 12, Exp: 4/7/2025 02/06/25 POC Auth Visits:  12       Today's Date   3/6/2025    Subjective  Hasn't been adherent with HEP this week but did it lots week.  Also missed the gym this week because life has been a little hectic.  Also having a hard time getting brand name Lexipro and pharmacy/insurance issures are occuring.  Has been off the Lexipro for 4-5 days now and is out of her medication.  Leith[place]Hatfield has been feeling better.  No leaks.  Reports some LBP worse in the morning, thinking its related to mattress.  The mattress isn't forming and supportive.       Pain: 0/10     Objective       Posture: Posture: APT, genu recurvatum               Pelvic Alignment: APT      Abdominal Wall Integrity: Coning but no LYNDSEY           Informed consent for external, clothed pelvic evaluation given:Yes      Demos excellent PFM coordination after instruction; learning from evaluation    Hip    MMT (-/5)    R L     Flex (L2) 5 5     Ext  4 4     Abd 4 4     ER 4 4     IR 4- 4      Balance and Functional Mobility  Gait: pt ambulates on level ground with normal mechanics.  SLS EO: R 10 SEC           L 10 SEC          Squat: excellent ER of hip, neutral spine, mild anterior knee     Assessment  Excellent tolerance to progression this session.  Demos learning of pelvic brace and excellent coordination of pelvic floor with NMR.  Excellent progression with bear planks this session!    Goals (to be met in 12 visits)     Not Met Progress Toward Partially Met Met   Patient will report decreased urinary frequency to <10x/day and 0x/night indicating normalizing micturition reflex for  improved bladder health and function. []  []  []  []    Patient will report ability to postpone urination for at least 30 mins after initial urge presents for improved ability to participate in community outings without fear of UI. []  []  []  []    Patient will report use of KNACK contraction at least 75% of the time to re-establish cough reflex and mitigate urinary leakage with increases in intra-abdominal pressure. []  []  []  []    Patient will report adherence to HEP for continued exercise benefits following cessation of PT. []  []  []  []                Plan  Cont core stabilization into standing    Treatment Last 4 Visits       2/6/2025 2/20/2025 3/6/2025   PT Treatment   Treatment Day  2 3   Therapeutic Exercise  Alt clam, reverse clam x10 B    Neuro Re-Ed Today's Treatment and Response:   Patient education was provided on objective findings of external and internal evaluation and expectations with treatment outcomes. Educated on pelvic anatomy and function with diagrams and pelvic model, bladder normatives, adequate hydration levels, stress/urge urinary incontinence strategies, diaphragmatic breathing for PNS activation and pelvic floor relaxation , coordination of diaphragmatic breathing and pelvic floor contraction , and knack/pelvic muscle brace    Diaphragmatic breathing  Pelvic brace  Pelvic breath  Pelvic brace with neutral curl up x10 B and 90/90 holds x10 Diaphragmatic breathing  Pelvic brace  Pelvic breath  Pelvic brace with neutral curl up x10 B  90/90 holds x10  SL hip abd with pelvic brace x10 B  Bear plank with pelvic brace 2x5 B  Rohit stretch x10 inhale and elongate    Therapeutic Activity --     Therapeutic Exercise Min  5    Neuro Re-Ed Min 30 40    Total of Timed Procedures 30 45    Total of Service Based 15 0    Total Treatment Time 45 45           2/20/2025 3/6/2025   Pelvic Treatment   Treatment Day 2 3   Therapeutic Exercise  Full ROM hip IR/ER x10 B   Neuro Re-Education  Pelvic brace  with bridge x10 DL, x10 B SL  Pelvic brace with neutral curl up x10 B --> progressed with SLR  90/90 holds x10  SL hip abd with pelvic brace x10 B  Bear plank with pelvic brace, x10 standard, 2x10 B --> progressed with adductor bal  Rohit stretch x10 inhale and elongate   Neuro Re-Educ Minutes  40   Total Time Of Timed Procedures  40   Total Time Of Service-Based Procedures  0   Total Treatment Time  40   HEP  Access Code: [email or web address]  URL: [email or web address]  Date: 03/06/2025  Prepared by: Joi Malik    Exercises  - Supine Diaphragmatic Breathing  - 1 x daily - 7 x weekly - 3 sets - 10 reps  - Neutral Curl Up with Straight Leg  - 1 x daily - 4 x weekly - 3 sets - 10 reps  - Supine 90/90 Abdominal Bracing  - 1 x daily - 4 x weekly - 3 sets - 10 reps  - Sidelying Pelvic Floor Contraction with Hip Abduction  - 1 x daily - 4 x weekly - 3 sets - 10 reps  - Clamshell  - 1 x daily - 4 x weekly - 2 sets - 20 reps  - Sidelying Reverse Clamshell  - 1 x daily - 4 x weekly - 2 sets - 20 reps  - Side Plank on Knees  - 1 x daily - 4 x weekly - 2 sets - 10 reps  - Bear Plank from Quadruped  - 1 x daily - 4 x weekly - 3 sets - 10 reps  - Neutral Curl Up with Straight Leg (Mirrored)  - 1 x daily - 4 x weekly - 3 sets - 10 reps  - Sidelying Pelvic Floor Contraction with Hip Abduction (Mirrored)  - 1 x daily - 4 x weekly - 3 sets - 10 reps  - Clamshell (Mirrored)  - 1 x daily - 4 x weekly - 2 sets - 20 reps  - Sidelying Reverse Clamshell (Mirrored)  - 1 x daily - 4 x weekly - 2 sets - 20 reps  - Side Plank on Knees (Mirrored)  - 1 x daily - 4 x weekly - 2 sets - 10 reps        HEP  Access Code: [email or web address]  URL: [email or web address]  Date: 03/06/2025  Prepared by: Joi Garciauscangela    Exercises  - Supine Diaphragmatic Breathing  - 1 x daily - 7 x weekly - 3 sets - 10 reps  - Neutral Curl Up with Straight Leg  - 1 x daily - 4 x weekly - 3 sets - 10 reps  - Supine 90/90 Abdominal Bracing  - 1 x daily  - 4 x weekly - 3 sets - 10 reps  - Sidelying Pelvic Floor Contraction with Hip Abduction  - 1 x daily - 4 x weekly - 3 sets - 10 reps  - Clamshell  - 1 x daily - 4 x weekly - 2 sets - 20 reps  - Sidelying Reverse Clamshell  - 1 x daily - 4 x weekly - 2 sets - 20 reps  - Side Plank on Knees  - 1 x daily - 4 x weekly - 2 sets - 10 reps  - Bear Plank from Quadruped  - 1 x daily - 4 x weekly - 3 sets - 10 reps  - Neutral Curl Up with Straight Leg (Mirrored)  - 1 x daily - 4 x weekly - 3 sets - 10 reps  - Sidelying Pelvic Floor Contraction with Hip Abduction (Mirrored)  - 1 x daily - 4 x weekly - 3 sets - 10 reps  - Clamshell (Mirrored)  - 1 x daily - 4 x weekly - 2 sets - 20 reps  - Sidelying Reverse Clamshell (Mirrored)  - 1 x daily - 4 x weekly - 2 sets - 20 reps  - Side Plank on Knees (Mirrored)  - 1 x daily - 4 x weekly - 2 sets - 10 reps    Charges  3 NMR

## 2025-03-09 ENCOUNTER — PATIENT MESSAGE (OUTPATIENT)
Dept: FAMILY MEDICINE CLINIC | Facility: CLINIC | Age: 35
End: 2025-03-09

## 2025-03-13 ENCOUNTER — APPOINTMENT (OUTPATIENT)
Dept: PHYSICAL THERAPY | Age: 35
End: 2025-03-13
Attending: NURSE PRACTITIONER
Payer: MEDICAID

## 2025-03-20 ENCOUNTER — APPOINTMENT (OUTPATIENT)
Dept: PHYSICAL THERAPY | Age: 35
End: 2025-03-20
Attending: NURSE PRACTITIONER
Payer: MEDICAID

## 2025-03-27 ENCOUNTER — APPOINTMENT (OUTPATIENT)
Dept: PHYSICAL THERAPY | Age: 35
End: 2025-03-27
Attending: NURSE PRACTITIONER
Payer: MEDICAID

## 2025-03-28 RX ORDER — LISDEXAMFETAMINE DIMESYLATE 30 MG/1
CAPSULE ORAL
Refills: 0 | OUTPATIENT
Start: 2025-03-28

## 2025-03-28 NOTE — TELEPHONE ENCOUNTER
Looks like vyvanse managed by Ami Mukherjee-this request refused     Dispensed Written Strength Quantity Refills Days Supply Provider Pharmacy   VYVANSE 30MG CAPSULES 01/05/2025 12/08/2024  30 each  30 Ami Mukherjee PA-C [facility]...   VYVANSE 30MG CAPSULES 10/08/2024 08/28/2024  30 each  30 Ami Mukherjee PA-C [facility]...

## 2025-04-03 ENCOUNTER — APPOINTMENT (OUTPATIENT)
Dept: PHYSICAL THERAPY | Age: 35
End: 2025-04-03
Attending: NURSE PRACTITIONER
Payer: MEDICAID

## 2025-04-07 DIAGNOSIS — G35 MS (MULTIPLE SCLEROSIS) (HCC): ICD-10-CM

## 2025-04-07 NOTE — TELEPHONE ENCOUNTER
Medication: TECFIDERA 240 MG Oral Capsule Delayed Release      Date of last refill: 04/16/2024 (#60/9)  [place]     Last office visit: 02/19/2025  Due back to clinic per last office note:  6 months   Date next office visit scheduled:    Future Appointments   Date Time Provider Department Center   4/10/2025  8:30 AM Lausch, Joi, PT [facility] [place]   4/17/2025  8:30 AM Lausch, Joi, PT [facility] [place]   4/24/2025 10:00 AM Lausch, Joi, PT [facility] [place]   5/1/2025 10:00 AM Lausch, Joi, PT [facility] [place]   5/8/2025 10:00 AM Lausch, Joi, PT [facility] [place]   5/15/2025 10:00 AM Lausch, Joi, PT [facility] [place]   8/20/2025  9:35 AM Nicky Ochoa DO [facility] [place]           Last OV note recommendation:    ASSESSMENT/ACTIVE PROBLEM LIST:           Encounter Diagnoses   Name Primary?    Migraine with aura and without status migrainosus, not intractable Yes    MS (multiple sclerosis) (HCC)      Right carpal tunnel syndrome      Insomnia, unspecified type           Discussion/Plan:  Clinical upper lumbar radiculopathy, as well as history of right S1 radiculopathy, latter found on EMG  Start gabapentin 100mg TID  Start physical therapy  If radicular pain does not improve, will recommend MRI L spine     Migraine headaches, occurring 2-3 days per week  At risk for medication overuse headache  Start topamax 25mg to 75mg nightly  Educated on triggers and lifestyle behaviors including limiting caffeine intake, not skipping meals, having regular sleep schedule and practicing good sleep hygiene, avoid migraine food triggers/try to identify if any of them are triggers (nitrates, aged cheeses, red wine, chocolate, msg, artificial sweetener). Also discussed medication overuse headache including taking abortive medications, or any combination of them, on more than 2-3 days of the week, and to take abortive medication immediately at onset of  headache        RRMS-   MRI brain 10/2022 overall showed no active lesion, and there was minimal progression compared to 2019  Low Vitamin D on 2000 daily, increase Vit D to 4000 units daily  Continue Tecfidera 240mg BID  Check CBC/CMP every 6 months     Insomnia- baseline sleeps 3-5 hours, but lately is more broken  Follow up with psychiatrist and therapist     R very mild carpal tunnel and intermittent median nerve irritation  Start a new wrist brace- go to  [facility] to get fitted  Avoid positions discussed.      Short term memory loss- B12 low normal, continue B12 500mcg daily oral, one consideration is concentration/attention deficit related, make sure having regular bedtime and wake up times     Requested Prescriptions             Signed Prescriptions Disp Refills    topiramate 25 MG Oral Tab 90 tablet 1       Sig: Week 1: 1 tab at night, Week 2: 2 tab nighlty, Week 3: 3 tabs nightly, Week 4: if tolerating, and if headaches persist, increase to 4 tabs nightly. Alternatively, if headaches improve at lower dose, or if have side effects with higher dose, can decrease to lower prior dose.               We discussed in depth regarding the diagnosis, prognosis, treatment. The patient was given ample opportunity to ask questions. All questions and concerns were addressed.      Shalonda cOhoa,   Neuromuscular and General Neurology  [facility]

## 2025-04-10 ENCOUNTER — APPOINTMENT (OUTPATIENT)
Dept: PHYSICAL THERAPY | Age: 35
End: 2025-04-10
Attending: NURSE PRACTITIONER
Payer: MEDICAID

## 2025-04-10 RX ORDER — DIMETHYL FUMARATE 240 MG/1
CAPSULE ORAL
Qty: 60 CAPSULE | Refills: 5 | Status: SHIPPED | OUTPATIENT
Start: 2025-04-10

## 2025-04-17 ENCOUNTER — APPOINTMENT (OUTPATIENT)
Dept: PHYSICAL THERAPY | Age: 35
End: 2025-04-17
Attending: NURSE PRACTITIONER
Payer: MEDICAID

## 2025-04-23 ENCOUNTER — TELEPHONE (OUTPATIENT)
Dept: PHYSICAL THERAPY | Facility: HOSPITAL | Age: 35
End: 2025-04-23

## 2025-04-24 ENCOUNTER — APPOINTMENT (OUTPATIENT)
Dept: PHYSICAL THERAPY | Age: 35
End: 2025-04-24
Attending: NURSE PRACTITIONER
Payer: MEDICAID

## 2025-05-01 ENCOUNTER — APPOINTMENT (OUTPATIENT)
Dept: PHYSICAL THERAPY | Age: 35
End: 2025-05-01
Attending: NURSE PRACTITIONER
Payer: MEDICAID

## 2025-05-07 ENCOUNTER — TELEPHONE (OUTPATIENT)
Dept: PHYSICAL THERAPY | Facility: HOSPITAL | Age: 35
End: 2025-05-07

## 2025-05-08 ENCOUNTER — APPOINTMENT (OUTPATIENT)
Dept: PHYSICAL THERAPY | Age: 35
End: 2025-05-08
Attending: NURSE PRACTITIONER
Payer: MEDICAID

## 2025-05-15 ENCOUNTER — APPOINTMENT (OUTPATIENT)
Dept: PHYSICAL THERAPY | Age: 35
End: 2025-05-15
Attending: NURSE PRACTITIONER
Payer: MEDICAID

## 2025-05-23 ENCOUNTER — PATIENT MESSAGE (OUTPATIENT)
Dept: NEUROLOGY | Facility: CLINIC | Age: 35
End: 2025-05-23

## (undated) DEVICE — LAP CHOLE/APPY CDS-LF: Brand: MEDLINE INDUSTRIES, INC.

## (undated) DEVICE — #15 STERILE STAINLESS BLADE: Brand: STERILE STAINLESS BLADES

## (undated) DEVICE — SOL  .9 1000ML BTL

## (undated) DEVICE — TROCAR: Brand: KII SHIELDED BLADED ACCESS SYSTEM

## (undated) DEVICE — C-ARM: Brand: UNBRANDED

## (undated) DEVICE — SLEEVE KENDALL SCD EXPRESS MED

## (undated) DEVICE — SPONGE: SPECIALTY PEANUT XR 100/CS: Brand: MEDICAL ACTION INDUSTRIES

## (undated) DEVICE — VIOLET BRAIDED (POLYGLACTIN 910), SYNTHETIC ABSORBABLE SUTURE: Brand: COATED VICRYL

## (undated) DEVICE — PROBE 8225101 5PK STD PRASS FL TIP ROHS

## (undated) DEVICE — TROCAR: Brand: KII® SLEEVE

## (undated) DEVICE — 40580 - THE PINK PAD - ADVANCED TRENDELENBURG POSITIONING KIT: Brand: 40580 - THE PINK PAD - ADVANCED TRENDELENBURG POSITIONING KIT

## (undated) DEVICE — CAUTERY PENCIL

## (undated) DEVICE — TROCARS: Brand: KII® BALLOON BLUNT TIP SYSTEM

## (undated) DEVICE — SUT MONOCRYL 4-0 PS-2 Y496G

## (undated) DEVICE — UNDYED BRAIDED (POLYGLACTIN 910), SYNTHETIC ABSORBABLE SUTURE: Brand: COATED VICRYL

## (undated) DEVICE — HEAD AND NECK CDS-LF: Brand: MEDLINE INDUSTRIES, INC.

## (undated) DEVICE — PAD SACRAL PREMIUM 12X12X1

## (undated) DEVICE — CASED DISP BIPOLAR CORD

## (undated) DEVICE — LIGHT HANDLE

## (undated) DEVICE — STERILE POLYISOPRENE POWDER-FREE SURGICAL GLOVES: Brand: PROTEXIS

## (undated) DEVICE — SCD SLEEVE KNEE HI BLEND

## (undated) DEVICE — TISSUE RETRIEVAL SYSTEM: Brand: INZII RETRIEVAL SYSTEM

## (undated) DEVICE — T & A CDS: Brand: MEDLINE INDUSTRIES, INC.

## (undated) DEVICE — 3M(TM) TEGADERM(TM) TRANSPARENT FILM DRESSING FRAME STYLE 9505W: Brand: 3M™ TEGADERM™

## (undated) DEVICE — APPLICATOR CHLORAPREP 10.5ML

## (undated) DEVICE — CHLORAPREP 26ML APPLICATOR

## (undated) DEVICE — KENDALL SCD EXPRESS SLEEVES, KNEE LENGTH, MEDIUM: Brand: KENDALL SCD

## (undated) DEVICE — GAMMEX® NON-LATEX PI TEXTURED SIZE 7.5, STERILE POLYISOPRENE POWDER-FREE SURGICAL GLOVE: Brand: GAMMEX

## (undated) DEVICE — TIGERTAIL 5F FLXTIP 70CM

## (undated) DEVICE — 3M™ STERI-STRIP™ REINFORCED ADHESIVE SKIN CLOSURES, R1547, 1/2 IN X 4 IN (12 MM X 100 MM), 6 STRIPS/ENVELOPE: Brand: 3M™ STERI-STRIP™

## (undated) DEVICE — MEDI-VAC SUCTION FINE CAPACITY: Brand: CARDINAL HEALTH

## (undated) DEVICE — HARMONIC FOCUS SHEARS 9CM LENGTH + ADAPTIVE TISSUE TECHNOLOGY FOR USE WITH BLUE HAND PIECE ONLY: Brand: HARMONIC FOCUS

## (undated) DEVICE — RETRACT LONE STAR STAYS DULL

## (undated) DEVICE — MEGADYNE E-Z CLEAN BLADE 2.75"

## (undated) DEVICE — NON-ADHERENT PAD PREPACK: Brand: TELFA

## (undated) DEVICE — DISPOSABLE LAPAROSCOPIC CLIP APPLIER WITH 20 CLIPS.: Brand: EPIX® UNIVERSAL CLIP APPLIER

## (undated) DEVICE — SUTURE MONOCRYL 4-0 PS-2

## (undated) DEVICE — ZIPWIRE GUIDE .038X150 STR/STF

## (undated) DEVICE — INSUFFLATION NEEDLE TO ESTABLISH PNEUMOPERITONEUM.: Brand: INSUFFLATION NEEDLE

## (undated) DEVICE — THE ECHELON FLEX POWERED PLUS ARTICULATING ENDOSCOPIC LINEAR CUTTERS ARE STERILE, SINGLE PATIENT USE INSTRUMENTS THAT SIMULTANEOUSLYCUT AND STAPLE TISSUE. THERE ARE SIX STAGGERED ROWS OF STAPLES, THREE ON EITHER SIDE OF THE CUT LINE. THE ECHELON FLEX 45 POWERED PLUSINSTRUMENTS HAVE A STAPLE LINE THAT IS APPROXIMATELY 45 MM LONG AND A CUT LINE THAT IS APPROXIMATELY 42 MM LONG. THE SHAFT CAN ROTATE FREELYIN BOTH DIRECTIONS AND AN ARTICULATION MECHANISM ENABLES THE DISTAL PORTION OF THE SHAFT TO PIVOT TO FACILITATE LATERAL ACCESS TO THE OPERATIVESITE.THE INSTRUMENTS ARE PACKAGED WITH A PRIMARY LITHIUM BATTERY PACK THAT MUST BE INSTALLED PRIOR TO USE. THERE ARE SPECIFIC REQUIREMENTS FORDISPOSING OF THE BATTERY PACK. REFER TO THE BATTERY PACK DISPOSAL SECTION.THE INSTRUMENTS ARE PACKAGED WITHOUT A RELOAD AND MUST BE LOADED PRIOR TO USE. A STAPLE RETAINING CAP ON THE RELOAD PROTECTS THE STAPLE LEGPOINTS DURING SHIPPING AND TRANSPORTATION. THE INSTRUMENTS’ LOCK-OUT FEATURE IS DESIGNED TO PREVENT A USED OR IMPROPERLY INSTALLED RELOADFROM BEING REFIRED OR AN INSTRUMENT FROM BEING FIRED WITHOUT A RELOAD.: Brand: ECHELON FLEX

## (undated) DEVICE — Device

## (undated) DEVICE — ENDOSCOPIC LINEAR CUTTER RELOADS WHITE 2.5 MM: Brand: ECHELON; ENDOPATH

## (undated) DEVICE — DISSECTOR SONICISION CORDLESS

## (undated) DEVICE — MONOFILAMENT ABSORBABLE SUTURE: Brand: MAXON

## (undated) DEVICE — GAUZE SPONGES,8 PLY: Brand: CURITY

## (undated) DEVICE — HEMOSTAT ARISTA 1GRAM

## (undated) DEVICE — SPONGE: SPECIALTY TONSIL XR MED 100/CS: Brand: MEDICAL ACTION INDUSTRIES

## (undated) DEVICE — SKIN MARKER DUAL TIP WITH RULER CAP AND LABELS: Brand: DEVON

## (undated) DEVICE — SOLUTION  .9 1000ML BTL

## (undated) DEVICE — ELECTRODE EDGE PENCIL 10FT

## (undated) NOTE — LETTER
Date & Time: 10/21/2023, 3:50 PM  Patient: Micky Harper  Encounter Provider(s):    SANIA Mohan       To Whom It May Concern:    Caitlin Bowden was seen and treated in our department on 10/21/2023. She should not return to work until fever free without the use of medications for 24 hours.      If you have any questions or concerns, please do not hesitate to call.        _____________________________  Physician/APC Signature

## (undated) NOTE — LETTER
2019  Return to School / Work    Name: Kleber Andrews        : 1990    To Whom It May Concern,    Kleber Andrews had surgery on 19  and is:    Able to return to school / work with restrictions:  No lifting over: 10 lbs until 19.     C

## (undated) NOTE — LETTER
Date: 4/26/2023    Patient Name: Keli Green          To Whom it may concern: This letter has been written at the patient's request. The above patient was seen at the [facility] for treatment of a medical condition. This patient should be excused from attending work 4/26/23 -4/27/23.  Able to return 4/28/23 if fever free for 24 hours and negative covid test         Sincerely,    Torin Austin PA-C

## (undated) NOTE — LETTER
Toya Vazquez [address]  [address] [facility]  Authorization for Surgical Operation and Procedure     Date:___________                                                                                                         Time:__________ but not all, of the potential risks that can occur: fever and allergic reactions, hemolytic reactions, transmission of diseases such as Hepatitis, AIDS and Cytomegalovirus (CMV) and fluid overload.   In the event that I wish to have an autologous transfusio attending physician will determine when the applicable recovery period ends for purposes of reinstating the DNAR order.   10. Patients having a sterilization procedure: I understand that if the procedure is successful the results will be permanent and it wi to: a. Allow the anesthesiologist (anesthesia doctor) to give me medicine and do additional procedures as necessary.  Some examples are: Starting or using an “IV” to give me medicine, fluids or blood during my procedure, and having a breathing tube placed (“spinal”, “epidural”, & “nerve blocks”): I understand that rare but potential complications include headache, bleeding, infection, seizure, irregular heart rhythms, and nerve injury.     I can change my mind about having anesthesia services at any time be

## (undated) NOTE — LETTER
Date & Time: 7/31/2024, 2:01 PM  Patient: Negar Gaona  Encounter Provider(s):    Greg Orlando APRN       To Whom It May Concern:    Negar Gaona was seen and treated in our department on 7/31/2024. She should not return to work until 8/2/24 .    If you have any questions or concerns, please do not hesitate to call.        _____________________________  Physician/APC Signature

## (undated) NOTE — LETTER
Date: 2/19/2025    Patient Name: Negar Gaona          To Whom it may concern:    This letter has been written at the patient's request. The above patient was seen at [facility] for treatment of a medical condition, today, 2/19/25          Sincerely,      Nicky Ochoa DO

## (undated) NOTE — LETTER
08/26/19        Lexx Kowalski  [id number] [facility]      Dear Rachel Gupta,    [address] [facility] records indicate that you have outstanding lab work and or testing that was ordered for you and has not yet been completed:  Orders Placed This Encou

## (undated) NOTE — LETTER
Consent for Administration of Patient-Supplied Pharmaceuticals   Occasionally, pharmaceuticals required/requested to be administered by [facility] staff in office/clinic settings cannot be purchased/made available by [facility] (typically parenteral).  Additionally, even when medications are available, patient out-of-pocket expense may be higher unless the medication is acquired through insurance/medical coverage-approved agreements/plans.  This written consent form is used when patient and/or insurer communications result in a decision to allow the alternative option of patient-supplied pharmaceutical use by [facility] staff medication administration in the office/clinic setting after an assessed absence of associated risk and in the presence of obvious patient expense and convenience benefits.  If, and when, all the following safety measures and guidelines are employed by the patient and/or his/her guardian/HCPOA/Agent, use is allowed based on consent until or unless it is rescinded after signed.              Patient Name: Negar Gaona  Patient YOB: 1990  Patient Medical Record Identifier: [id number]  Patient-Supplied Pharmaceutical(s) Med Name/Dose/Route/Applicable Begin or End Date/Special Administration Instructions/Ordering Provider, if different from administering provider office oversight:  ______________________________________________________________________  _______MedroxyProgesterone 150mg/ml_______________________________________________________________  ____________________________________________________________________________________________________________________________________________     I, (patient and/or designee and relationship) ___Negar Gaona _____________________________, attest, based on my personal knowledge and handling, that the above noted pharmaceutical(s) provided for [facility] administration, were maintained and/or managed in transport according to 's and/or specific  prescriber instructions.  This includes, but is not limited to, temperature and light requirements/precautions.  I attest that, while in my possession, the pharmaceutical has not been tampered with or damaged and that any assessed damage to the original seal will result in the medication being ineligible for administration by [facility] staff, requiring replacement by patient for reconsideration.       I am aware of the risks, benefits and alternatives of receiving the medication, have had the opportunity to ask clarifying questions and agree to indemnify and hold harmless [facility] and its affiliates, officers, board members, employees and agents from any and all claims and liability for injuries or damages arising directly or indirectly as a result of administration of self-supplied pharmaceuticals including, but not limited to, unanticipated symptoms, allergic reaction(s), side effect(s) and/or known complications arising from use/administration.      Patient Printed Name:  Negar Gaona  Patient Signature: ___________________________________________________  [facility] staff Witness:  Quyen ALDANA RN  Date/Time:  6/17/2024/2:40 PM

## (undated) NOTE — LETTER
09/06/19      To whom it may concern,    Hermes Howell was recently diagnosed (8/15/19 with Relapsing/Remitting Multiple Sclerosis.

## (undated) NOTE — LETTER
Date & Time: 12/2/2023, 3:42 PM  Patient: Quinton Bardales  Encounter Provider(s):    Youlanda Homans, APRN       To Whom It May Concern:    Calleen Gosselin was seen and treated in our department on 12/2/2023. She should not return to work until December for or sooner if feeling better .     If you have any questions or concerns, please do not hesitate to call.        _____________________________  Physician/APC Signature

## (undated) NOTE — ED AVS SNAPSHOT
Nohemy Snider   MRN: [id number]    Department:  THE [facility] Emergency Department in [place]   Date of Visit:  10/2/2019           Disclosure     Insurance plans vary and the physician(s) referred by the ER may not be covered by your plan.  Please tell this physician (or your personal doctor if your instructions are to return to your personal doctor) about any new or lasting problems. The primary care or specialist physician will see patients referred from the [facility] Emergency Department.  Severo Pastures

## (undated) NOTE — LETTER
[facility]  Arlin Briananh [id number] [facility], [address] [place] [address]    Consent for Operation    Date: __________________    Time: _______________    1.  I authorize the performance upon Magali Pacheco the following operation:       LAPAROSCOPIC APPENDECTOMY, POSSIB videotape. The [facility] will not be responsible for storage or maintenance of this tape. 6. For the purpose of advancing medical education, I consent to the admittance of observers to the Operating Room.     7. I authorize the use of any specimen, organs Signature of Patient:   ___________________________    When the patient is a minor or mentally incompetent to give consent:  Signature of person authorized to consent for patient: ___________________________   Relationship to patient: _____________________ 3. I understand how the anesthesia medicine will help me (benefits). 4. I understand that with any type of anesthesia medicine there are risks:  a.  The most common risks are: nausea, vomiting, sore throat, muscle soreness, damage to my eyes, mouth, or t _____________________________________________________________________________  Witness        Date   Time  I have verified that the signature is that of the patient or patient’s representative, and that it was signed before the procedure    Page 2 of 2

## (undated) NOTE — LETTER
19    Patient: Jorge Alberto Schmitt   1990  Member ID: [id number]  Case Record # [id number]     [facility] Plans  Attn: Con[facility] [address] [facility], [address] [place] [address]     Fax #: [phone]     To Whom it May Many studies report that injection fatigue is one of the main reasons that MS patients become non-compliant with their treatment plans. Due to Ms. Gaona's current disease state it is imperative that she be started on a treatment plan that she feels is [place] which can be potentially disabling, perhaps permanently. I therefore request that the denial of coverage of this medication be reconsidered in light of the variables presented.    If you have any further questions, please do not hesitate to contact my offic

## (undated) NOTE — LETTER
09/26/24        To whom it may concern,    This letter was written at the request of the patient. Negar Gaona is currently a patient of mine and seen in my clinic. She has the diagnosis of Multiple Sclerosis and lumbar radiculopathy which affect her ability to stand for long periods of time. Please allow her to have accommodations during her time at the park.     Thank you          Nicky Ochoa,   Neurology  [facility]

## (undated) NOTE — MR AVS SNAPSHOT
After Visit Summary   1/2/2024    Negar Gaona   MRN: [id number]           Visit Information     Date & Time  1/2/2024  1:15 PM Provider  Nicky Ochoa DO Department  [facility] Phone  [phone]      Allergies as of 1/2/2024  Review status set to In Progress on 12/2/2023   No Known Allergies     Your Current Medications        Dosage    TECFIDERA 240 MG Oral Capsule Delayed Release TAKE 1 CAPSULE IN THE MORNING AND 1 CAPSULE BEFORE BEDTIME    ondansetron 4 MG Oral Tablet Dispersible Take 1 tablet (4 mg total) by mouth every 8 (eight) hours as needed for Nausea.    Cholecalciferol (VITAMIN D3) 50 MCG (2000 UT) Oral Chew Tab     Cyanocobalamin (B-12) 1000 MCG Oral Tab     topiramate 25 MG Oral Tab Week 1: 1 tab at night, Week 2: 2 tab nighlty, Week 3: 3 tabs nightly, Week 4: if tolerating, and if headaches persist, increase to 4 tabs nightly. Alternatively, if headaches improve at lower dose, or if have side effects with higher dose, can decrease to lower prior dose.      Diagnoses for This Visit    Lumbar radiculopathy   [650860]  -  Primary  Numbness and tingling in both hands   [6401760]    Right leg numbness   [050855]             We Ordered the Following     Normal Orders This Visit    EMG (At [facility]) [NEU5 CUSTOM]     Physical Therapy Referral - [place] Location [[id number] CUSTOM]       Future Appointments        Provider Department    3/20/2024 2:35 PM Nicky Ochoa [facility]                Did you know that [facility] primary care physicians now offer Video Visits through [employer] for adult patients for a variety of conditions such as allergies, back pain and cold symptoms? Skip the drive and waiting room and online chat with a doctor face-to-face using your web-cam enabled computer or mobile device wherever you are. Video Visits cost $50 and can be paid hassle-free using a credit, debit, or health savings  card.  Not active on [employer]? Ask us how to get signed up today!          If you receive a survey from [employer], please take a few minutes to complete it and provide feedback. We strive to deliver the best patient experience and are looking for ways to make improvements. Your feedback will help us do so. For more information on [employer], please visit [email or web address]           No text in SmartText           No text in SmartText

## (undated) NOTE — ED AVS SNAPSHOT
Velazcopuja Burleson   MRN: [id number]    Department:  THE [facility] in [place]   Date of Visit:  7/30/2019           Disclosure     Insurance plans vary and the physician(s) referred by the ER may not be covered by your plan.  Please contact tell this physician (or your personal doctor if your instructions are to return to your personal doctor) about any new or lasting problems. The primary care or specialist physician will see patients referred from the [facility] Emergency Department.  Ever Navarrete

## (undated) NOTE — LETTER
Toya Vazquez [address]  [place], [address] [place] [address]    Consent for Operation  Date: __________________                                Time: _______________    1.  I authorize the performance upon Tori Martinez the following operation:  Procedure( procedure has been videotaped, the surgeon will obtain the original videotape. The hospital will not be responsible for storage or maintenance of this tape.   7. For the purpose of advancing medical education, I consent to the admittance of observers to the STATEMENTS REQUIRING INSERTION OR COMPLETION WERE FILLED IN.     Signature of Patient:   ___________________________    When the patient is a minor or mentally incompetent to give consent:  Signature of person authorized to consent for patient: ____________ supplements, and pills I can buy without a prescription (including street drugs/illegal medications). Failure to inform my anesthesiologist about these medicines may increase my risk of anesthetic complications. iv.  If I am allergic to anything or have ha Anesthesiologist Signature     Date   Time  I have discussed the procedure and information above with the patient (or patient’s representative) and answered their questions. The patient or their representative has agreed to have anesthesia services.     ___